# Patient Record
Sex: MALE | Race: WHITE | NOT HISPANIC OR LATINO | URBAN - METROPOLITAN AREA
[De-identification: names, ages, dates, MRNs, and addresses within clinical notes are randomized per-mention and may not be internally consistent; named-entity substitution may affect disease eponyms.]

---

## 2018-12-31 ENCOUNTER — EMERGENCY (EMERGENCY)
Facility: HOSPITAL | Age: 73
LOS: 1 days | Discharge: HOME | End: 2018-12-31
Attending: EMERGENCY MEDICINE | Admitting: EMERGENCY MEDICINE
Payer: MEDICARE

## 2018-12-31 VITALS
HEART RATE: 102 BPM | DIASTOLIC BLOOD PRESSURE: 72 MMHG | TEMPERATURE: 97 F | SYSTOLIC BLOOD PRESSURE: 141 MMHG | RESPIRATION RATE: 16 BRPM | OXYGEN SATURATION: 100 %

## 2018-12-31 VITALS
OXYGEN SATURATION: 100 % | DIASTOLIC BLOOD PRESSURE: 85 MMHG | SYSTOLIC BLOOD PRESSURE: 153 MMHG | TEMPERATURE: 99 F | RESPIRATION RATE: 20 BRPM | HEART RATE: 93 BPM

## 2018-12-31 LAB
ALBUMIN SERPL ELPH-MCNC: 3.7 G/DL — SIGNIFICANT CHANGE UP (ref 3.3–5)
ALP SERPL-CCNC: 95 U/L — SIGNIFICANT CHANGE UP (ref 40–120)
ALT FLD-CCNC: 25 U/L — SIGNIFICANT CHANGE UP (ref 4–41)
AST SERPL-CCNC: 27 U/L — SIGNIFICANT CHANGE UP (ref 4–40)
B PERT DNA SPEC QL NAA+PROBE: NOT DETECTED — SIGNIFICANT CHANGE UP
BASE EXCESS BLDV CALC-SCNC: 4.2 MMOL/L — SIGNIFICANT CHANGE UP
BASOPHILS # BLD AUTO: 0.02 K/UL — SIGNIFICANT CHANGE UP (ref 0–0.2)
BASOPHILS NFR BLD AUTO: 0.2 % — SIGNIFICANT CHANGE UP (ref 0–2)
BILIRUB SERPL-MCNC: < 0.2 MG/DL — LOW (ref 0.2–1.2)
BLOOD GAS VENOUS - CREATININE: 0.48 MG/DL — LOW (ref 0.5–1.3)
BUN SERPL-MCNC: 7 MG/DL — SIGNIFICANT CHANGE UP (ref 7–23)
C PNEUM DNA SPEC QL NAA+PROBE: NOT DETECTED — SIGNIFICANT CHANGE UP
CALCIUM SERPL-MCNC: 9.1 MG/DL — SIGNIFICANT CHANGE UP (ref 8.4–10.5)
CHLORIDE BLDV-SCNC: 102 MMOL/L — SIGNIFICANT CHANGE UP (ref 96–108)
CHLORIDE SERPL-SCNC: 97 MMOL/L — LOW (ref 98–107)
CO2 SERPL-SCNC: 27 MMOL/L — SIGNIFICANT CHANGE UP (ref 22–31)
CREAT SERPL-MCNC: 0.66 MG/DL — SIGNIFICANT CHANGE UP (ref 0.5–1.3)
EOSINOPHIL # BLD AUTO: 0.05 K/UL — SIGNIFICANT CHANGE UP (ref 0–0.5)
EOSINOPHIL NFR BLD AUTO: 0.6 % — SIGNIFICANT CHANGE UP (ref 0–6)
FLUAV H1 2009 PAND RNA SPEC QL NAA+PROBE: NOT DETECTED — SIGNIFICANT CHANGE UP
FLUAV H1 RNA SPEC QL NAA+PROBE: NOT DETECTED — SIGNIFICANT CHANGE UP
FLUAV H3 RNA SPEC QL NAA+PROBE: NOT DETECTED — SIGNIFICANT CHANGE UP
FLUAV SUBTYP SPEC NAA+PROBE: NOT DETECTED — SIGNIFICANT CHANGE UP
FLUBV RNA SPEC QL NAA+PROBE: NOT DETECTED — SIGNIFICANT CHANGE UP
GAS PNL BLDV: 130 MMOL/L — LOW (ref 136–146)
GLUCOSE BLDV-MCNC: 81 — SIGNIFICANT CHANGE UP (ref 70–99)
GLUCOSE SERPL-MCNC: 78 MG/DL — SIGNIFICANT CHANGE UP (ref 70–99)
HADV DNA SPEC QL NAA+PROBE: NOT DETECTED — SIGNIFICANT CHANGE UP
HCO3 BLDV-SCNC: 25 MMOL/L — SIGNIFICANT CHANGE UP (ref 20–27)
HCOV PNL SPEC NAA+PROBE: DETECTED — HIGH
HCT VFR BLD CALC: 37.4 % — LOW (ref 39–50)
HCT VFR BLDV CALC: 40.5 % — SIGNIFICANT CHANGE UP (ref 39–51)
HGB BLD-MCNC: 12.9 G/DL — LOW (ref 13–17)
HGB BLDV-MCNC: 13.2 G/DL — SIGNIFICANT CHANGE UP (ref 13–17)
HMPV RNA SPEC QL NAA+PROBE: NOT DETECTED — SIGNIFICANT CHANGE UP
HPIV1 RNA SPEC QL NAA+PROBE: NOT DETECTED — SIGNIFICANT CHANGE UP
HPIV2 RNA SPEC QL NAA+PROBE: NOT DETECTED — SIGNIFICANT CHANGE UP
HPIV3 RNA SPEC QL NAA+PROBE: NOT DETECTED — SIGNIFICANT CHANGE UP
HPIV4 RNA SPEC QL NAA+PROBE: NOT DETECTED — SIGNIFICANT CHANGE UP
IMM GRANULOCYTES # BLD AUTO: 0.03 # — SIGNIFICANT CHANGE UP
IMM GRANULOCYTES NFR BLD AUTO: 0.4 % — SIGNIFICANT CHANGE UP (ref 0–1.5)
LACTATE BLDV-MCNC: 1.4 MMOL/L — SIGNIFICANT CHANGE UP (ref 0.5–2)
LIDOCAIN IGE QN: 18.1 U/L — SIGNIFICANT CHANGE UP (ref 7–60)
LYMPHOCYTES # BLD AUTO: 1.81 K/UL — SIGNIFICANT CHANGE UP (ref 1–3.3)
LYMPHOCYTES # BLD AUTO: 22 % — SIGNIFICANT CHANGE UP (ref 13–44)
MCHC RBC-ENTMCNC: 31.8 PG — SIGNIFICANT CHANGE UP (ref 27–34)
MCHC RBC-ENTMCNC: 34.5 % — SIGNIFICANT CHANGE UP (ref 32–36)
MCV RBC AUTO: 92.1 FL — SIGNIFICANT CHANGE UP (ref 80–100)
MONOCYTES # BLD AUTO: 1.54 K/UL — HIGH (ref 0–0.9)
MONOCYTES NFR BLD AUTO: 18.7 % — HIGH (ref 2–14)
NEUTROPHILS # BLD AUTO: 4.78 K/UL — SIGNIFICANT CHANGE UP (ref 1.8–7.4)
NEUTROPHILS NFR BLD AUTO: 58.1 % — SIGNIFICANT CHANGE UP (ref 43–77)
NRBC # FLD: 0 — SIGNIFICANT CHANGE UP
PCO2 BLDV: 59 MMHG — HIGH (ref 41–51)
PH BLDV: 7.32 PH — SIGNIFICANT CHANGE UP (ref 7.32–7.43)
PLATELET # BLD AUTO: 267 K/UL — SIGNIFICANT CHANGE UP (ref 150–400)
PMV BLD: 8.7 FL — SIGNIFICANT CHANGE UP (ref 7–13)
PO2 BLDV: 21 MMHG — LOW (ref 35–40)
POTASSIUM BLDV-SCNC: 3.4 MMOL/L — SIGNIFICANT CHANGE UP (ref 3.4–4.5)
POTASSIUM SERPL-MCNC: 3.6 MMOL/L — SIGNIFICANT CHANGE UP (ref 3.5–5.3)
POTASSIUM SERPL-SCNC: 3.6 MMOL/L — SIGNIFICANT CHANGE UP (ref 3.5–5.3)
PROT SERPL-MCNC: 6.9 G/DL — SIGNIFICANT CHANGE UP (ref 6–8.3)
RBC # BLD: 4.06 M/UL — LOW (ref 4.2–5.8)
RBC # FLD: 14.1 % — SIGNIFICANT CHANGE UP (ref 10.3–14.5)
RSV RNA SPEC QL NAA+PROBE: NOT DETECTED — SIGNIFICANT CHANGE UP
RV+EV RNA SPEC QL NAA+PROBE: NOT DETECTED — SIGNIFICANT CHANGE UP
SAO2 % BLDV: 22.9 % — LOW (ref 60–85)
SODIUM SERPL-SCNC: 135 MMOL/L — SIGNIFICANT CHANGE UP (ref 135–145)
WBC # BLD: 8.23 K/UL — SIGNIFICANT CHANGE UP (ref 3.8–10.5)
WBC # FLD AUTO: 8.23 K/UL — SIGNIFICANT CHANGE UP (ref 3.8–10.5)

## 2018-12-31 PROCEDURE — 99283 EMERGENCY DEPT VISIT LOW MDM: CPT | Mod: GC

## 2018-12-31 PROCEDURE — 71046 X-RAY EXAM CHEST 2 VIEWS: CPT | Mod: 26

## 2018-12-31 NOTE — ED PROVIDER NOTE - CONSTITUTIONAL, MLM
normal... Sitting up in bed, patient doesn't appear to be in any distress, nods "no" to all questions asked

## 2018-12-31 NOTE — ED PROVIDER NOTE - ENMT, MLM
Airway patent, Nasal mucosa w/ dried discharge, Mouth with normal mucosa. Throat has no vesicles, Tonsils mildly erythematous, mildly enlarged bilaterally, no evidence of abscess, no exudates, oropharynx patent. Uvula is midline..

## 2018-12-31 NOTE — ED PROVIDER NOTE - OBJECTIVE STATEMENT
74 yo M h/o schizophrenia per documents, [facility] reports of cough, chest pain, and soar throat that started yest. Patient is poor historian at baseline and shakes his head to all questions. Aid at bedside,. provides the above history along with documents sent in from the facility.

## 2018-12-31 NOTE — ED PROVIDER NOTE - NSFOLLOWUPINSTRUCTIONS_ED_ALL_ED_FT
-- Please follow up with your doctor(s) within 3 days, but seek care sooner if your symptoms are worsening.  -- Rest, increase your fluid intake and avoid dehydration & alcohol.   -- It is very important to be diligent about hand washing & hygiene to avoid spreading the illness to others.  -- You were given a copy of the results from any tests performed today in the Emergency Department which have results available.  Show these to your doctor(s).   Some of the tests we sent may not have results yet so please call or have your doctor call the Emergency Department to follow up on all results.  -- If you are having any worsening or continued fever, chills, weakness, nausea, vomiting, abdominal pain, please see your doctor or return to the Emergency Department.  -- Please continue taking your home medications as directed.  Do not use alcohol when taking any medication (especially antibiotics, tylenol or other pain medication) unless you check with the doctor or pharmacist.

## 2018-12-31 NOTE — ED ADULT NURSE NOTE - OBJECTIVE STATEMENT
Pt from [facility] sent for N/V, sore throat, and cough since last night.  Pt denies any complaints.  Calm cooperative behavior.  1:1 from BF in place.  Labs obtained and sent as ordered.  #20g SL L AC placed.  Kept NPO

## 2018-12-31 NOTE — ED PROVIDER NOTE - NS ED ROS FT
Unable to obtain, secondary to mental status from patient  Per Sheets sent in from facility and aid at beside, ROS significant for:  +Cough, +CP, +soar throat.

## 2018-12-31 NOTE — ED PROVIDER NOTE - PROGRESS NOTE DETAILS
Zvi Dubin, MD ([id number]) note: Pt seen & reassessed.  Pt symptomatically improved.   A&Ox3, NAD, VSS, pt tolerated PO & ambulated w/steady unassisted gait in the ED.  We discussed the results of ED w/u w/patient & staff member ranlufo @bedside (incl. presumptive Emergency Department dx, associated anticipatory guidance, stressing importance of prompt f/u, return precautions), & gave them a copy of results.  Patient verbalized understanding of ED course & agreed with our f/u recommendations, has decisional making capacity.  Pt st they will f/u w/PMD within the next 3 days; pt agrees to call today or tomorrow for an appointment. Pt agrees to return to the ED if there is any worsening or concerning symptoms.

## 2018-12-31 NOTE — ED PROVIDER NOTE - RESPIRATORY, MLM
Right lower lobe rales, rest of lung fields - no wheezes, rales or rhonchi, moving air, although distant breath sounds throughout.
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
## 2018-12-31 NOTE — ED ADULT TRIAGE NOTE - CHIEF COMPLAINT QUOTE
Sent from group home c/o sore throat, cough, vomiting and poor appetite since yesterday. Denies diarrhea or fevers.

## 2018-12-31 NOTE — ED PROVIDER NOTE - NEUROLOGICAL, MLM
Limited, mental status limited secondary to baseline mental status - Tone WNL, moving all extremities, reflexes intact throughout, pupils are 2+ and symmetric , no nystagmus.  Unable to assess sensation, cerebellar function, 2/2 mental status

## 2018-12-31 NOTE — ED ADULT NURSE NOTE - NSIMPLEMENTINTERV_GEN_ALL_ED
Implemented All Universal Safety Interventions:  [place] to call system. Call bell, personal items and telephone within reach. Instruct patient to call for assistance. Room bathroom lighting operational. Non-slip footwear when patient is off stretcher. Physically safe environment: no spills, clutter or unnecessary equipment. Stretcher in lowest position, wheels locked, appropriate side rails in place.

## 2019-08-10 ENCOUNTER — EMERGENCY (EMERGENCY)
Facility: HOSPITAL | Age: 74
LOS: 0 days | Discharge: HOME | End: 2019-08-10
Payer: MEDICARE

## 2019-08-10 VITALS
DIASTOLIC BLOOD PRESSURE: 73 MMHG | OXYGEN SATURATION: 98 % | RESPIRATION RATE: 16 BRPM | HEIGHT: 70 IN | WEIGHT: 130.07 LBS | HEART RATE: 86 BPM | TEMPERATURE: 98 F | SYSTOLIC BLOOD PRESSURE: 109 MMHG

## 2019-08-10 DIAGNOSIS — F20.9 SCHIZOPHRENIA, UNSPECIFIED: ICD-10-CM

## 2019-08-10 DIAGNOSIS — S02.2XXA FRACTURE OF NASAL BONES, INITIAL ENCOUNTER FOR CLOSED FRACTURE: ICD-10-CM
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
## 2019-08-10 DIAGNOSIS — H57.11 OCULAR PAIN, RIGHT EYE: ICD-10-CM
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
## 2019-08-10 PROCEDURE — 70450 CT HEAD/BRAIN W/O DYE: CPT | Mod: 26

## 2019-08-10 PROCEDURE — 76377 3D RENDER W/INTRP POSTPROCES: CPT | Mod: 26

## 2019-08-10 PROCEDURE — 70486 CT MAXILLOFACIAL W/O DYE: CPT | Mod: 26

## 2019-08-10 PROCEDURE — 99284 EMERGENCY DEPT VISIT MOD MDM: CPT | Mod: 25

## 2019-08-10 PROCEDURE — 21310: CPT

## 2019-08-10 RX ORDER — ACETAMINOPHEN 500 MG
650 TABLET ORAL ONCE
Refills: 0 | Status: COMPLETED | OUTPATIENT
Start: 2019-08-10 | End: 2019-08-10

## 2019-08-10 RX ADMIN — Medication 650 MILLIGRAM(S): at 14:27
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
## 2020-01-12 ENCOUNTER — EMERGENCY (EMERGENCY)
Facility: HOSPITAL | Age: 75
LOS: 1 days | Discharge: HOME | End: 2020-01-12
Admitting: EMERGENCY MEDICINE
Payer: MEDICARE

## 2020-01-12 VITALS
TEMPERATURE: 98 F | DIASTOLIC BLOOD PRESSURE: 77 MMHG | HEART RATE: 95 BPM | RESPIRATION RATE: 18 BRPM | OXYGEN SATURATION: 98 % | SYSTOLIC BLOOD PRESSURE: 138 MMHG

## 2020-01-12 PROCEDURE — 73502 X-RAY EXAM HIP UNI 2-3 VIEWS: CPT | Mod: 26,LT

## 2020-01-12 PROCEDURE — 99283 EMERGENCY DEPT VISIT LOW MDM: CPT

## 2020-01-12 PROCEDURE — 73564 X-RAY EXAM KNEE 4 OR MORE: CPT | Mod: 26,LT

## 2020-01-12 NOTE — ED ADULT TRIAGE NOTE - CHIEF COMPLAINT QUOTE
Pt brought in by group home member for fall yesterday, states pt did not hit his head. Pt not answering questions at this time.

## 2020-01-12 NOTE — ED PROVIDER NOTE - PROGRESS NOTE DETAILS
ERIBERTO Dill- Pts staff stated that she would call to arrange transport back to group home. discharge paperk work and xray reports were given to staff member.

## 2020-01-12 NOTE — ED PROVIDER NOTE - PATIENT PORTAL LINK FT
You can access the FollowMyHealth Patient Portal offered by [facility] by registering at the following website: [email or web address]. By joining [address]’s FollowMyHealth portal, you will also be able to view your health information using other applications (apps) compatible with our system.

## 2020-01-12 NOTE — ED PROVIDER NOTE - CLINICAL SUMMARY MEDICAL DECISION MAKING FREE TEXT BOX
73 y/o male pmh schizophrenia from group home w/ witnessed fall- pt is poor hx, no acute findings on PE but will check xray to r/o possible underlying fracture.

## 2020-01-12 NOTE — ED PROVIDER NOTE - OBJECTIVE STATEMENT
73 y/o male pmh schizophrenia sent in from group home for mechanical fall x1 day ago. As per staff, pt had witnessed fall yesterday, falling on his R buttock, denies head trauma or LOC. Pt was limping today which prompted ER visit. Pt admits to left knee pain. Denies any other complaints.

## 2021-03-12 ENCOUNTER — EMERGENCY (EMERGENCY)
Facility: HOSPITAL | Age: 76
LOS: 0 days | Discharge: HOME | End: 2021-03-12
Attending: EMERGENCY MEDICINE
Payer: MEDICARE

## 2021-03-12 VITALS
HEART RATE: 90 BPM | SYSTOLIC BLOOD PRESSURE: 131 MMHG | DIASTOLIC BLOOD PRESSURE: 74 MMHG | RESPIRATION RATE: 19 BRPM | OXYGEN SATURATION: 97 % | TEMPERATURE: 98 F | HEIGHT: 70 IN | WEIGHT: 164.91 LBS
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
## 2021-03-12 DIAGNOSIS — M25.562 PAIN IN LEFT KNEE: ICD-10-CM

## 2021-03-12 DIAGNOSIS — F20.9 SCHIZOPHRENIA, UNSPECIFIED: ICD-10-CM

## 2021-03-12 DIAGNOSIS — I10 ESSENTIAL (PRIMARY) HYPERTENSION: ICD-10-CM
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
## 2021-03-12 DIAGNOSIS — Z91.041 RADIOGRAPHIC DYE ALLERGY STATUS: ICD-10-CM
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
## 2021-03-12 DIAGNOSIS — F79 UNSPECIFIED INTELLECTUAL DISABILITIES: ICD-10-CM

## 2021-03-12 PROCEDURE — 99284 EMERGENCY DEPT VISIT MOD MDM: CPT

## 2021-03-12 PROCEDURE — 73564 X-RAY EXAM KNEE 4 OR MORE: CPT | Mod: 26,LT

## 2021-03-12 PROCEDURE — 70450 CT HEAD/BRAIN W/O DYE: CPT | Mod: 26,MH

## 2021-03-12 PROCEDURE — 72125 CT NECK SPINE W/O DYE: CPT | Mod: 26,MH

## 2021-03-12 NOTE — ED PROVIDER NOTE - CLINICAL SUMMARY MEDICAL DECISION MAKING FREE TEXT BOX
Well appearing male from group home, at baseline, no obvious injuries, walking at baseline, fell in shower.  VSS.  Ct imaging and XRs unremarkable, unable appreciate any injuries on exam, patient ok to dc back to group home.  Based on patient's history and physical exam, there are no apparent indications for further emergent labs, imaging, or additional diagnostics at this time.  Caregiver advised to have patient f/u with PMD.

## 2021-03-12 NOTE — ED PROVIDER NOTE - PATIENT PORTAL LINK FT
You can access the FollowMyHealth Patient Portal offered by [facility] by registering at the following website: [email or web address]. By joining [email or web address]’s FollowMyHealth portal, you will also be able to view your health information using other applications (apps) compatible with our system.

## 2021-03-12 NOTE — ED ADULT TRIAGE NOTE - CHIEF COMPLAINT QUOTE
hx of intellectual disorder and schizophrenia per EMS [facility] S/P unwitnessed fall in shower, unknown LOC. Pt states he slipped in shower C/O L knee pain.

## 2021-03-12 NOTE — ED PROVIDER NOTE - OBJECTIVE STATEMENT
Pertinent PMH/PSH/FHx/SHx and Review of Systems contained within:  Patient presents to the ED for evaluation s/p fall.  Patient comes from [facility] group home, unable to give clear history, rambles which is normal for him.  Caregiver at bedside only knows that he fell while in group home, unwitnessed, no known LOC.  Patient in no distress, moving extremities, no apparent head injury, was c/o left knee pain to staff.   He denies any distress, keeps saying "Im ok!"  Per chart he is not on any blood thinners.     Relevant PMHx/SHx/SOCHx/FAMH:  HTN, seizures, intellectual disability, schizophrenia  Staff denies EtOH/tobacco/illicit substance use. Pertinent PMH/PSH/FHx/SHx and Review of Systems contained within:  Patient presents to the ED for evaluation s/p fall.  Patient comes from [facility] group home, unable to give clear history, rambles which is normal for him.  Caregiver at bedside only knows that he fell while in group home taking shower, unwitnessed, no known LOC, no visible injury.  Patient in no distress, moving extremities, no apparent head injury, was c/o left knee pain to staff.   He denies any distress, keeps saying "Im ok!"  He says that he slipped and seems to recall the fall.  Per chart he is not on any blood thinners.  Caregiver says that patient at baseline.     Relevant PMHx/SHx/SOCHx/FAMH:  HTN, seizures, intellectual disability, schizophrenia  Staff denies EtOH/tobacco/illicit substance use. Pertinent PMH/PSH/FHx/SHx and Review of Systems contained within:  Patient presents to the ED for evaluation s/p fall.  Patient comes from [facility] group home.  Caregiver at bedside says that he fell while in group home taking shower, unwitnessed, no known LOC, no visible injury.  Patient in no distress, moving extremities, no apparent head injury, was c/o left knee pain to staff.   He denies any distress, keeps saying "Im ok!"  He says that he slipped on soap and recalls the fall.  Per chart he is not on any blood thinners.  Caregiver says that patient at baseline.  He currently denies any knee pain.    Relevant PMHx/SHx/SOCHx/FAMH:  HTN, seizures, intellectual disability, schizophrenia  Staff denies EtOH/tobacco/illicit substance use.

## 2022-02-26 ENCOUNTER — INPATIENT (INPATIENT)
Facility: HOSPITAL | Age: 77
LOS: 4 days | Discharge: HOME | End: 2022-03-03
Attending: HOSPITALIST | Admitting: HOSPITALIST
Payer: MEDICARE

## 2022-02-26 VITALS
SYSTOLIC BLOOD PRESSURE: 122 MMHG | TEMPERATURE: 97 F | DIASTOLIC BLOOD PRESSURE: 73 MMHG | HEIGHT: 70 IN | HEART RATE: 95 BPM | OXYGEN SATURATION: 94 % | WEIGHT: 130.07 LBS | RESPIRATION RATE: 16 BRPM

## 2022-02-26 LAB
ALBUMIN SERPL ELPH-MCNC: 2.7 G/DL — LOW (ref 3.3–5)
ALP SERPL-CCNC: 77 U/L — SIGNIFICANT CHANGE UP (ref 40–120)
ALT FLD-CCNC: 20 U/L — SIGNIFICANT CHANGE UP (ref 12–78)
ANION GAP SERPL CALC-SCNC: 3 MMOL/L — LOW (ref 5–17)
APTT BLD: 38 SEC — HIGH (ref 27.5–35.5)
AST SERPL-CCNC: 21 U/L — SIGNIFICANT CHANGE UP (ref 15–37)
BASOPHILS # BLD AUTO: 0.02 K/UL — SIGNIFICANT CHANGE UP (ref 0–0.2)
BASOPHILS NFR BLD AUTO: 0.3 % — SIGNIFICANT CHANGE UP (ref 0–2)
BILIRUB SERPL-MCNC: 0.3 MG/DL — SIGNIFICANT CHANGE UP (ref 0.2–1.2)
BUN SERPL-MCNC: 8 MG/DL — SIGNIFICANT CHANGE UP (ref 7–23)
CALCIUM SERPL-MCNC: 8.5 MG/DL — SIGNIFICANT CHANGE UP (ref 8.5–10.1)
CHLORIDE SERPL-SCNC: 97 MMOL/L — SIGNIFICANT CHANGE UP (ref 96–108)
CO2 SERPL-SCNC: 32 MMOL/L — HIGH (ref 22–31)
CREAT SERPL-MCNC: 0.62 MG/DL — SIGNIFICANT CHANGE UP (ref 0.5–1.3)
EOSINOPHIL # BLD AUTO: 0.11 K/UL — SIGNIFICANT CHANGE UP (ref 0–0.5)
EOSINOPHIL NFR BLD AUTO: 1.7 % — SIGNIFICANT CHANGE UP (ref 0–6)
ERYTHROCYTE [SEDIMENTATION RATE] IN BLOOD: 7 MM/HR — SIGNIFICANT CHANGE UP (ref 0–20)
FLUAV AG NPH QL: SIGNIFICANT CHANGE UP
FLUBV AG NPH QL: SIGNIFICANT CHANGE UP
GLUCOSE SERPL-MCNC: 110 MG/DL — HIGH (ref 70–99)
HCT VFR BLD CALC: 30.1 % — LOW (ref 39–50)
HGB BLD-MCNC: 10.6 G/DL — LOW (ref 13–17)
IMM GRANULOCYTES NFR BLD AUTO: 0.6 % — SIGNIFICANT CHANGE UP (ref 0–1.5)
INR BLD: 1.32 RATIO — HIGH (ref 0.88–1.16)
LYMPHOCYTES # BLD AUTO: 1.63 K/UL — SIGNIFICANT CHANGE UP (ref 1–3.3)
LYMPHOCYTES # BLD AUTO: 25.8 % — SIGNIFICANT CHANGE UP (ref 13–44)
MCHC RBC-ENTMCNC: 32.3 PG — SIGNIFICANT CHANGE UP (ref 27–34)
MCHC RBC-ENTMCNC: 35.2 G/DL — SIGNIFICANT CHANGE UP (ref 32–36)
MCV RBC AUTO: 91.8 FL — SIGNIFICANT CHANGE UP (ref 80–100)
MONOCYTES # BLD AUTO: 1.13 K/UL — HIGH (ref 0–0.9)
MONOCYTES NFR BLD AUTO: 17.9 % — HIGH (ref 2–14)
NEUTROPHILS # BLD AUTO: 3.38 K/UL — SIGNIFICANT CHANGE UP (ref 1.8–7.4)
NEUTROPHILS NFR BLD AUTO: 53.7 % — SIGNIFICANT CHANGE UP (ref 43–77)
NRBC # BLD: 0 /100 WBCS — SIGNIFICANT CHANGE UP (ref 0–0)
PLATELET # BLD AUTO: 199 K/UL — SIGNIFICANT CHANGE UP (ref 150–400)
POTASSIUM SERPL-MCNC: 3.9 MMOL/L — SIGNIFICANT CHANGE UP (ref 3.5–5.3)
POTASSIUM SERPL-SCNC: 3.9 MMOL/L — SIGNIFICANT CHANGE UP (ref 3.5–5.3)
PROT SERPL-MCNC: 6.5 GM/DL — SIGNIFICANT CHANGE UP (ref 6–8.3)
PROTHROM AB SERPL-ACNC: 15.7 SEC — HIGH (ref 10.5–13.4)
RBC # BLD: 3.28 M/UL — LOW (ref 4.2–5.8)
RBC # FLD: 14.3 % — SIGNIFICANT CHANGE UP (ref 10.3–14.5)
SARS-COV-2 RNA SPEC QL NAA+PROBE: SIGNIFICANT CHANGE UP
SODIUM SERPL-SCNC: 132 MMOL/L — LOW (ref 135–145)
WBC # BLD: 6.31 K/UL — SIGNIFICANT CHANGE UP (ref 3.8–10.5)
WBC # FLD AUTO: 6.31 K/UL — SIGNIFICANT CHANGE UP (ref 3.8–10.5)

## 2022-02-26 PROCEDURE — 93010 ELECTROCARDIOGRAM REPORT: CPT

## 2022-02-26 PROCEDURE — 73600 X-RAY EXAM OF ANKLE: CPT | Mod: 26,RT

## 2022-02-26 PROCEDURE — 99285 EMERGENCY DEPT VISIT HI MDM: CPT

## 2022-02-26 PROCEDURE — 73590 X-RAY EXAM OF LOWER LEG: CPT | Mod: 26,RT

## 2022-02-26 PROCEDURE — 99222 1ST HOSP IP/OBS MODERATE 55: CPT

## 2022-02-26 PROCEDURE — 93971 EXTREMITY STUDY: CPT | Mod: 26,RT

## 2022-02-26 RX ORDER — CLOPIDOGREL BISULFATE 75 MG/1
75 TABLET, FILM COATED ORAL DAILY
Refills: 0 | Status: DISCONTINUED | OUTPATIENT
Start: 2022-02-26 | End: 2022-03-03

## 2022-02-26 RX ORDER — ACETAMINOPHEN 500 MG
975 TABLET ORAL ONCE
Refills: 0 | Status: COMPLETED | OUTPATIENT
Start: 2022-02-26 | End: 2022-02-26

## 2022-02-26 RX ORDER — CITALOPRAM 10 MG/1
20 TABLET, FILM COATED ORAL DAILY
Refills: 0 | Status: DISCONTINUED | OUTPATIENT
Start: 2022-02-26 | End: 2022-02-26

## 2022-02-26 RX ORDER — ALBUTEROL 90 UG/1
2 AEROSOL, METERED ORAL EVERY 6 HOURS
Refills: 0 | Status: DISCONTINUED | OUTPATIENT
Start: 2022-02-26 | End: 2022-03-03

## 2022-02-26 RX ORDER — ASPIRIN/CALCIUM CARB/MAGNESIUM 324 MG
81 TABLET ORAL DAILY
Refills: 0 | Status: DISCONTINUED | OUTPATIENT
Start: 2022-02-26 | End: 2022-03-03

## 2022-02-26 RX ORDER — LACTULOSE 10 G/15ML
10 SOLUTION ORAL DAILY
Refills: 0 | Status: DISCONTINUED | OUTPATIENT
Start: 2022-02-26 | End: 2022-03-03

## 2022-02-26 RX ORDER — CITALOPRAM 10 MG/1
1 TABLET, FILM COATED ORAL
Qty: 0 | Refills: 0 | DISCHARGE

## 2022-02-26 RX ORDER — CITALOPRAM 10 MG/1
30 TABLET, FILM COATED ORAL DAILY
Refills: 0 | Status: DISCONTINUED | OUTPATIENT
Start: 2022-02-26 | End: 2022-03-03

## 2022-02-26 RX ORDER — DIVALPROEX SODIUM 500 MG/1
500 TABLET, DELAYED RELEASE ORAL
Refills: 0 | Status: DISCONTINUED | OUTPATIENT
Start: 2022-02-26 | End: 2022-03-03

## 2022-02-26 RX ORDER — AMLODIPINE BESYLATE 2.5 MG/1
1 TABLET ORAL
Qty: 0 | Refills: 0 | DISCHARGE

## 2022-02-26 RX ORDER — DILTIAZEM HCL 120 MG
120 CAPSULE, EXT RELEASE 24 HR ORAL DAILY
Refills: 0 | Status: DISCONTINUED | OUTPATIENT
Start: 2022-02-26 | End: 2022-03-03

## 2022-02-26 RX ORDER — DIVALPROEX SODIUM 500 MG/1
0 TABLET, DELAYED RELEASE ORAL
Qty: 0 | Refills: 0 | DISCHARGE

## 2022-02-26 RX ORDER — ATORVASTATIN CALCIUM 80 MG/1
20 TABLET, FILM COATED ORAL AT BEDTIME
Refills: 0 | Status: DISCONTINUED | OUTPATIENT
Start: 2022-02-26 | End: 2022-03-03

## 2022-02-26 RX ORDER — CEFTRIAXONE 500 MG/1
1000 INJECTION, POWDER, FOR SOLUTION INTRAMUSCULAR; INTRAVENOUS ONCE
Refills: 0 | Status: COMPLETED | OUTPATIENT
Start: 2022-02-26 | End: 2022-02-26

## 2022-02-26 RX ORDER — OLANZAPINE 15 MG/1
20 TABLET, FILM COATED ORAL DAILY
Refills: 0 | Status: DISCONTINUED | OUTPATIENT
Start: 2022-02-26 | End: 2022-03-03

## 2022-02-26 RX ADMIN — Medication 975 MILLIGRAM(S): at 21:32

## 2022-02-26 RX ADMIN — CEFTRIAXONE 100 MILLIGRAM(S): 500 INJECTION, POWDER, FOR SOLUTION INTRAMUSCULAR; INTRAVENOUS at 21:32

## 2022-02-26 RX ADMIN — Medication 975 MILLIGRAM(S): at 19:54

## 2022-02-26 RX ADMIN — DIVALPROEX SODIUM 500 MILLIGRAM(S): 500 TABLET, DELAYED RELEASE ORAL at 23:46

## 2022-02-26 NOTE — ED PROVIDER NOTE - CLINICAL SUMMARY MEDICAL DECISION MAKING FREE TEXT BOX
76y Male with PMHx of HTN, COPD, schizophrenia, mild intellectual disability [facility]. RLE pain and swelling with skin color changes for unknown period of time. Denies chest pain, SOB or difficulty breathing. RLE swelling, erythema and pain - will get basics, coags, pain meds and venous duplex.

## 2022-02-26 NOTE — H&P ADULT - NSHPLABSRESULTS_GEN_ALL_CORE
T(C): 36.3 (02-26-22 @ 23:57), Max: 36.3 (02-26-22 @ 18:31)  HR: 69 (02-26-22 @ 23:57) (69 - 95)  BP: 122/78 (02-26-22 @ 23:57) (122/73 - 122/78)  RR: 18 (02-26-22 @ 23:57) (16 - 18)  SpO2: 96% (02-26-22 @ 23:57) (94% - 96%)                        10.6   6.31  )-----------( 199      ( 26 Feb 2022 20:10 )             30.1     02-26    132<L>  |  97  |  8   ----------------------------<  110<H>  3.9   |  32<H>  |  0.62    Ca    8.5      26 Feb 2022 20:10    TPro  6.5  /  Alb  2.7<L>  /  TBili  0.3  /  DBili  x   /  AST  21  /  ALT  20  /  AlkPhos  77  02-26    LIVER FUNCTIONS - ( 26 Feb 2022 20:10 )  Alb: 2.7 g/dL / Pro: 6.5 gm/dL / ALK PHOS: 77 U/L / ALT: 20 U/L / AST: 21 U/L / GGT: x           PT/INR - ( 26 Feb 2022 20:10 )   PT: 15.7 sec;   INR: 1.32 ratio         PTT - ( 26 Feb 2022 20:10 )  PTT:38.0 sec    < from: US Duplex Venous Lower Ext Ltd, Right (02.26.22 @ 20:45) >    IMPRESSION:  No evidence of right lower extremity deep venous thrombosis.    < end of copied text >    acetaminophen     Tablet .. 650 milliGRAM(s) Oral every 6 hours PRN  ALBUTerol    90 MICROgram(s) HFA Inhaler 2 Puff(s) Inhalation every 6 hours PRN  aspirin enteric coated 81 milliGRAM(s) Oral daily  atorvastatin 20 milliGRAM(s) Oral at bedtime  citalopram 30 milliGRAM(s) Oral daily  clindamycin   Capsule 600 milliGRAM(s) Oral every 8 hours  clopidogrel Tablet 75 milliGRAM(s) Oral daily  diltiazem    milliGRAM(s) Oral daily  diVALproex  milliGRAM(s) Oral <User Schedule>  lactulose Syrup 10 Gram(s) Oral daily  multivitamin 1 Tablet(s) Oral daily  OLANZapine 20 milliGRAM(s) Oral daily  ondansetron Injectable 4 milliGRAM(s) IV Push every 8 hours PRN  traMADol 50 milliGRAM(s) Oral every 6 hours PRN

## 2022-02-26 NOTE — ED PROVIDER NOTE - ATTENDING CONTRIBUTION TO CARE
Dr. Ayaz WARE: I performed a face to face bedside interview with patient regarding history of present illness, review of symptoms and past medical history. I completed an independent physical exam.  I have discussed patient's plan of care with PA.   I agree with note as stated above, having amended the EMR as needed to reflect my findings.   This includes HISTORY OF PRESENT ILLNESS, HIV, PAST MEDICAL/SURGICAL/FAMILY/SOCIAL HISTORY, ALLERGIES AND HOME MEDICATIONS, REVIEW OF SYSTEMS, PHYSICAL EXAM, and any PROGRESS NOTES during the time I functioned as the attending physician for this patient.

## 2022-02-26 NOTE — H&P ADULT - HISTORY OF PRESENT ILLNESS
75 y/o Male with PMHx of HTN, COPD, schizophrenia, mild intellectual disability [facility]. Patient comes from [facility] where Aid at bedside reports RLE pain and swelling with skin color changes for unknown period of time; aid stated he came in this morning and pt's leg was red and swollen. No reported trauma, fever or chills. Pt initially sent to  to r/o DVT; underwent ? XR which was neg per aid then sent to ED for further evaluation.

## 2022-02-26 NOTE — ED ADULT NURSE NOTE - OBJECTIVE STATEMENT
Pt is a 77 yo M, AOx3 ambulatory at baseline, pmhx MR, schizophrenia from group home with aide (Martir) at bedside. Pt sent in [facility] for rule out D[facility]. RLE swollen, red and painful. Denies sob, cp, fever, chills, n/v/d. Allergies listed

## 2022-02-26 NOTE — PATIENT PROFILE ADULT - FALL HARM RISK - HARM RISK INTERVENTIONS

## 2022-02-26 NOTE — ED PROVIDER NOTE - OBJECTIVE STATEMENT
76y Male with PMHx of HTN, COPD, schizophrenia, mild intellectual disability [facility]. Patient comes from [facility], staff reports RLE pain and swelling with skin color changes for unknown period of time. Sent from urgent care to rule out DVT. Denies chest pain, SOB or difficulty breathing. Denies pain medications.

## 2022-02-26 NOTE — H&P ADULT - ASSESSMENT
77 y/o Male with PMHx of HTN, COPD, schizophrenia, mild intellectual disability [facility], pt being admitted for difficulty ambulating    1) RLE Pain, swelling difficulty ambulating   - R leg w/ multiple bruises/echymosis; appears at least a few days old  - Xrays pre-shepard neg   - ? mild superimposed cellulitis; place on PO Clinda  - Pain control  - fall precautions  - would attempt to reach group home in the am to obtain further hx as to how pt's leg got like this    2) Schizophrenia, MR  - c/w Celexa  - c/w Depakote    3) HTN  - c/w Diltiazem    4) COPD  - c/w inhalers prn    5) DVT ppx - Lovenox subq

## 2022-02-26 NOTE — ED PROVIDER NOTE - SKIN, MLM
Skin normal color for race, warm, dry and intact. Erythema and increased warmth to anterior distal RLE. Skin normal color for race, warm, dry and intact. Circumferential erythema and increased warmth to anterior distal RLE with tenderness to touch.

## 2022-02-26 NOTE — H&P ADULT - NSICDXPASTMEDICALHX_GEN_ALL_CORE_FT
PAST MEDICAL HISTORY:  Benign essential HTN     History of COPD     Intellectual disability     Schizophrenia

## 2022-02-26 NOTE — ED PROVIDER NOTE - NS ED ROS FT
Constitutional: (-) Fever, (-) Chills  Skin: (+) Rashes  Eyes: (-) Visual changes, (-) Discharge, (-) Redness  Ears: (-) Hearing loss, (-)Tinnitus, (-) Ear pain  Nose: (-) Nasal congestion, (-) Runny nose  Mouth/Throat: (-) Sore throat  CV: (-) Chest pain  Resp: (-) Cough, (-) Shortness of breath, (-) Dyspnea on Exertion, (-) Wheezing  GI: (-) Abdominal pain, (-) Nausea, (-) Vomiting, (-) Diarrhea  : (-) Dysuria   MSK: (+) Myalgias  Neuro: (-) Headache

## 2022-02-27 LAB
ALBUMIN SERPL ELPH-MCNC: 2.6 G/DL — LOW (ref 3.3–5)
ALP SERPL-CCNC: 72 U/L — SIGNIFICANT CHANGE UP (ref 40–120)
ALT FLD-CCNC: 19 U/L — SIGNIFICANT CHANGE UP (ref 12–78)
AMPHET UR-MCNC: NEGATIVE — SIGNIFICANT CHANGE UP
ANION GAP SERPL CALC-SCNC: 2 MMOL/L — LOW (ref 5–17)
AST SERPL-CCNC: 17 U/L — SIGNIFICANT CHANGE UP (ref 15–37)
BARBITURATES UR SCN-MCNC: NEGATIVE — SIGNIFICANT CHANGE UP
BASOPHILS # BLD AUTO: 0.02 K/UL — SIGNIFICANT CHANGE UP (ref 0–0.2)
BASOPHILS NFR BLD AUTO: 0.3 % — SIGNIFICANT CHANGE UP (ref 0–2)
BENZODIAZ UR-MCNC: NEGATIVE — SIGNIFICANT CHANGE UP
BILIRUB SERPL-MCNC: 0.3 MG/DL — SIGNIFICANT CHANGE UP (ref 0.2–1.2)
BUN SERPL-MCNC: 7 MG/DL — SIGNIFICANT CHANGE UP (ref 7–23)
CALCIUM SERPL-MCNC: 8.2 MG/DL — LOW (ref 8.5–10.1)
CHLORIDE SERPL-SCNC: 96 MMOL/L — SIGNIFICANT CHANGE UP (ref 96–108)
CO2 SERPL-SCNC: 33 MMOL/L — HIGH (ref 22–31)
COCAINE METAB.OTHER UR-MCNC: NEGATIVE — SIGNIFICANT CHANGE UP
CREAT SERPL-MCNC: 0.45 MG/DL — LOW (ref 0.5–1.3)
CRP SERPL-MCNC: 17 MG/L — HIGH
EOSINOPHIL # BLD AUTO: 0.12 K/UL — SIGNIFICANT CHANGE UP (ref 0–0.5)
EOSINOPHIL NFR BLD AUTO: 2.1 % — SIGNIFICANT CHANGE UP (ref 0–6)
ERYTHROCYTE [SEDIMENTATION RATE] IN BLOOD: 5 MM/HR — SIGNIFICANT CHANGE UP (ref 0–20)
GLUCOSE SERPL-MCNC: 73 MG/DL — SIGNIFICANT CHANGE UP (ref 70–99)
HCT VFR BLD CALC: 29.1 % — LOW (ref 39–50)
HCV AB S/CO SERPL IA: 0.29 S/CO — SIGNIFICANT CHANGE UP (ref 0–0.99)
HCV AB SERPL-IMP: SIGNIFICANT CHANGE UP
HGB BLD-MCNC: 10.1 G/DL — LOW (ref 13–17)
IMM GRANULOCYTES NFR BLD AUTO: 0.3 % — SIGNIFICANT CHANGE UP (ref 0–1.5)
LYMPHOCYTES # BLD AUTO: 1.95 K/UL — SIGNIFICANT CHANGE UP (ref 1–3.3)
LYMPHOCYTES # BLD AUTO: 33.4 % — SIGNIFICANT CHANGE UP (ref 13–44)
MAGNESIUM SERPL-MCNC: 1.9 MG/DL — SIGNIFICANT CHANGE UP (ref 1.6–2.6)
MCHC RBC-ENTMCNC: 32.2 PG — SIGNIFICANT CHANGE UP (ref 27–34)
MCHC RBC-ENTMCNC: 34.7 G/DL — SIGNIFICANT CHANGE UP (ref 32–36)
MCV RBC AUTO: 92.7 FL — SIGNIFICANT CHANGE UP (ref 80–100)
METHADONE UR-MCNC: NEGATIVE — SIGNIFICANT CHANGE UP
MONOCYTES # BLD AUTO: 1.09 K/UL — HIGH (ref 0–0.9)
MONOCYTES NFR BLD AUTO: 18.7 % — HIGH (ref 2–14)
NEUTROPHILS # BLD AUTO: 2.64 K/UL — SIGNIFICANT CHANGE UP (ref 1.8–7.4)
NEUTROPHILS NFR BLD AUTO: 45.2 % — SIGNIFICANT CHANGE UP (ref 43–77)
NRBC # BLD: 0 /100 WBCS — SIGNIFICANT CHANGE UP (ref 0–0)
OPIATES UR-MCNC: NEGATIVE — SIGNIFICANT CHANGE UP
PCP SPEC-MCNC: SIGNIFICANT CHANGE UP
PCP UR-MCNC: NEGATIVE — SIGNIFICANT CHANGE UP
PHOSPHATE SERPL-MCNC: 3.2 MG/DL — SIGNIFICANT CHANGE UP (ref 2.5–4.5)
PLATELET # BLD AUTO: 186 K/UL — SIGNIFICANT CHANGE UP (ref 150–400)
POTASSIUM SERPL-MCNC: 3.9 MMOL/L — SIGNIFICANT CHANGE UP (ref 3.5–5.3)
POTASSIUM SERPL-SCNC: 3.9 MMOL/L — SIGNIFICANT CHANGE UP (ref 3.5–5.3)
PROCALCITONIN SERPL-MCNC: 0.04 NG/ML — SIGNIFICANT CHANGE UP (ref 0.02–0.1)
PROT SERPL-MCNC: 6.3 GM/DL — SIGNIFICANT CHANGE UP (ref 6–8.3)
RBC # BLD: 3.14 M/UL — LOW (ref 4.2–5.8)
RBC # FLD: 14.4 % — SIGNIFICANT CHANGE UP (ref 10.3–14.5)
SODIUM SERPL-SCNC: 131 MMOL/L — LOW (ref 135–145)
THC UR QL: NEGATIVE — SIGNIFICANT CHANGE UP
WBC # BLD: 5.84 K/UL — SIGNIFICANT CHANGE UP (ref 3.8–10.5)
WBC # FLD AUTO: 5.84 K/UL — SIGNIFICANT CHANGE UP (ref 3.8–10.5)

## 2022-02-27 PROCEDURE — 99233 SBSQ HOSP IP/OBS HIGH 50: CPT

## 2022-02-27 RX ORDER — ACETAMINOPHEN 500 MG
650 TABLET ORAL EVERY 6 HOURS
Refills: 0 | Status: DISCONTINUED | OUTPATIENT
Start: 2022-02-27 | End: 2022-03-03

## 2022-02-27 RX ORDER — ONDANSETRON 8 MG/1
4 TABLET, FILM COATED ORAL EVERY 8 HOURS
Refills: 0 | Status: DISCONTINUED | OUTPATIENT
Start: 2022-02-27 | End: 2022-03-03

## 2022-02-27 RX ORDER — TRAMADOL HYDROCHLORIDE 50 MG/1
50 TABLET ORAL EVERY 6 HOURS
Refills: 0 | Status: DISCONTINUED | OUTPATIENT
Start: 2022-02-27 | End: 2022-03-03

## 2022-02-27 RX ADMIN — Medication 120 MILLIGRAM(S): at 05:15

## 2022-02-27 RX ADMIN — TRAMADOL HYDROCHLORIDE 50 MILLIGRAM(S): 50 TABLET ORAL at 22:42

## 2022-02-27 RX ADMIN — OLANZAPINE 20 MILLIGRAM(S): 15 TABLET, FILM COATED ORAL at 12:04

## 2022-02-27 RX ADMIN — CITALOPRAM 30 MILLIGRAM(S): 10 TABLET, FILM COATED ORAL at 12:04

## 2022-02-27 RX ADMIN — Medication 600 MILLIGRAM(S): at 14:26

## 2022-02-27 RX ADMIN — ATORVASTATIN CALCIUM 20 MILLIGRAM(S): 80 TABLET, FILM COATED ORAL at 21:42

## 2022-02-27 RX ADMIN — CLOPIDOGREL BISULFATE 75 MILLIGRAM(S): 75 TABLET, FILM COATED ORAL at 12:04

## 2022-02-27 RX ADMIN — DIVALPROEX SODIUM 500 MILLIGRAM(S): 500 TABLET, DELAYED RELEASE ORAL at 21:39

## 2022-02-27 RX ADMIN — Medication 600 MILLIGRAM(S): at 05:15

## 2022-02-27 RX ADMIN — Medication 81 MILLIGRAM(S): at 12:04

## 2022-02-27 RX ADMIN — LACTULOSE 10 GRAM(S): 10 SOLUTION ORAL at 12:04

## 2022-02-27 RX ADMIN — DIVALPROEX SODIUM 500 MILLIGRAM(S): 500 TABLET, DELAYED RELEASE ORAL at 08:00

## 2022-02-27 RX ADMIN — Medication 600 MILLIGRAM(S): at 21:40

## 2022-02-27 RX ADMIN — Medication 1 TABLET(S): at 12:03

## 2022-02-27 RX ADMIN — TRAMADOL HYDROCHLORIDE 50 MILLIGRAM(S): 50 TABLET ORAL at 21:42

## 2022-02-28 LAB
ANION GAP SERPL CALC-SCNC: 5 MMOL/L — SIGNIFICANT CHANGE UP (ref 5–17)
BUN SERPL-MCNC: 8 MG/DL — SIGNIFICANT CHANGE UP (ref 7–23)
CALCIUM SERPL-MCNC: 8.6 MG/DL — SIGNIFICANT CHANGE UP (ref 8.5–10.1)
CHLORIDE SERPL-SCNC: 94 MMOL/L — LOW (ref 96–108)
CO2 SERPL-SCNC: 30 MMOL/L — SIGNIFICANT CHANGE UP (ref 22–31)
CREAT SERPL-MCNC: 0.4 MG/DL — LOW (ref 0.5–1.3)
GLUCOSE SERPL-MCNC: 87 MG/DL — SIGNIFICANT CHANGE UP (ref 70–99)
HCT VFR BLD CALC: 30.8 % — LOW (ref 39–50)
HGB BLD-MCNC: 10.8 G/DL — LOW (ref 13–17)
MCHC RBC-ENTMCNC: 32.1 PG — SIGNIFICANT CHANGE UP (ref 27–34)
MCHC RBC-ENTMCNC: 35.1 G/DL — SIGNIFICANT CHANGE UP (ref 32–36)
MCV RBC AUTO: 91.7 FL — SIGNIFICANT CHANGE UP (ref 80–100)
NRBC # BLD: 0 /100 WBCS — SIGNIFICANT CHANGE UP (ref 0–0)
PLATELET # BLD AUTO: 216 K/UL — SIGNIFICANT CHANGE UP (ref 150–400)
POTASSIUM SERPL-MCNC: 4.3 MMOL/L — SIGNIFICANT CHANGE UP (ref 3.5–5.3)
POTASSIUM SERPL-SCNC: 4.3 MMOL/L — SIGNIFICANT CHANGE UP (ref 3.5–5.3)
RBC # BLD: 3.36 M/UL — LOW (ref 4.2–5.8)
RBC # FLD: 14.3 % — SIGNIFICANT CHANGE UP (ref 10.3–14.5)
SODIUM SERPL-SCNC: 129 MMOL/L — LOW (ref 135–145)
WBC # BLD: 6.52 K/UL — SIGNIFICANT CHANGE UP (ref 3.8–10.5)
WBC # FLD AUTO: 6.52 K/UL — SIGNIFICANT CHANGE UP (ref 3.8–10.5)

## 2022-02-28 PROCEDURE — 99232 SBSQ HOSP IP/OBS MODERATE 35: CPT

## 2022-02-28 RX ORDER — PIPERACILLIN AND TAZOBACTAM 4; .5 G/20ML; G/20ML
3.38 INJECTION, POWDER, LYOPHILIZED, FOR SOLUTION INTRAVENOUS ONCE
Refills: 0 | Status: COMPLETED | OUTPATIENT
Start: 2022-02-28 | End: 2022-02-28

## 2022-02-28 RX ORDER — PIPERACILLIN AND TAZOBACTAM 4; .5 G/20ML; G/20ML
3.38 INJECTION, POWDER, LYOPHILIZED, FOR SOLUTION INTRAVENOUS EVERY 8 HOURS
Refills: 0 | Status: DISCONTINUED | OUTPATIENT
Start: 2022-02-28 | End: 2022-03-03

## 2022-02-28 RX ADMIN — CITALOPRAM 30 MILLIGRAM(S): 10 TABLET, FILM COATED ORAL at 12:39

## 2022-02-28 RX ADMIN — Medication 81 MILLIGRAM(S): at 12:45

## 2022-02-28 RX ADMIN — CLOPIDOGREL BISULFATE 75 MILLIGRAM(S): 75 TABLET, FILM COATED ORAL at 12:45

## 2022-02-28 RX ADMIN — ATORVASTATIN CALCIUM 20 MILLIGRAM(S): 80 TABLET, FILM COATED ORAL at 21:52

## 2022-02-28 RX ADMIN — PIPERACILLIN AND TAZOBACTAM 200 GRAM(S): 4; .5 INJECTION, POWDER, LYOPHILIZED, FOR SOLUTION INTRAVENOUS at 17:50

## 2022-02-28 RX ADMIN — PIPERACILLIN AND TAZOBACTAM 25 GRAM(S): 4; .5 INJECTION, POWDER, LYOPHILIZED, FOR SOLUTION INTRAVENOUS at 21:54

## 2022-02-28 RX ADMIN — OLANZAPINE 20 MILLIGRAM(S): 15 TABLET, FILM COATED ORAL at 12:38

## 2022-02-28 RX ADMIN — LACTULOSE 10 GRAM(S): 10 SOLUTION ORAL at 12:44

## 2022-02-28 RX ADMIN — DIVALPROEX SODIUM 500 MILLIGRAM(S): 500 TABLET, DELAYED RELEASE ORAL at 08:11

## 2022-02-28 RX ADMIN — Medication 120 MILLIGRAM(S): at 05:47

## 2022-02-28 RX ADMIN — Medication 1 TABLET(S): at 12:47

## 2022-02-28 RX ADMIN — DIVALPROEX SODIUM 500 MILLIGRAM(S): 500 TABLET, DELAYED RELEASE ORAL at 21:04

## 2022-02-28 RX ADMIN — Medication 600 MILLIGRAM(S): at 05:47

## 2022-03-01 LAB
ANION GAP SERPL CALC-SCNC: 4 MMOL/L — LOW (ref 5–17)
BUN SERPL-MCNC: 12 MG/DL — SIGNIFICANT CHANGE UP (ref 7–23)
CALCIUM SERPL-MCNC: 8.4 MG/DL — LOW (ref 8.5–10.1)
CHLORIDE SERPL-SCNC: 98 MMOL/L — SIGNIFICANT CHANGE UP (ref 96–108)
CO2 SERPL-SCNC: 30 MMOL/L — SIGNIFICANT CHANGE UP (ref 22–31)
CREAT SERPL-MCNC: 0.52 MG/DL — SIGNIFICANT CHANGE UP (ref 0.5–1.3)
EGFR: 105 ML/MIN/1.73M2 — SIGNIFICANT CHANGE UP
GLUCOSE SERPL-MCNC: 89 MG/DL — SIGNIFICANT CHANGE UP (ref 70–99)
HCT VFR BLD CALC: 32.1 % — LOW (ref 39–50)
HGB BLD-MCNC: 11 G/DL — LOW (ref 13–17)
MCHC RBC-ENTMCNC: 31.3 PG — SIGNIFICANT CHANGE UP (ref 27–34)
MCHC RBC-ENTMCNC: 34.3 G/DL — SIGNIFICANT CHANGE UP (ref 32–36)
MCV RBC AUTO: 91.5 FL — SIGNIFICANT CHANGE UP (ref 80–100)
NRBC # BLD: 0 /100 WBCS — SIGNIFICANT CHANGE UP (ref 0–0)
PLATELET # BLD AUTO: 242 K/UL — SIGNIFICANT CHANGE UP (ref 150–400)
POTASSIUM SERPL-MCNC: 4 MMOL/L — SIGNIFICANT CHANGE UP (ref 3.5–5.3)
POTASSIUM SERPL-SCNC: 4 MMOL/L — SIGNIFICANT CHANGE UP (ref 3.5–5.3)
RBC # BLD: 3.51 M/UL — LOW (ref 4.2–5.8)
RBC # FLD: 14.1 % — SIGNIFICANT CHANGE UP (ref 10.3–14.5)
SODIUM SERPL-SCNC: 132 MMOL/L — LOW (ref 135–145)
WBC # BLD: 7.63 K/UL — SIGNIFICANT CHANGE UP (ref 3.8–10.5)
WBC # FLD AUTO: 7.63 K/UL — SIGNIFICANT CHANGE UP (ref 3.8–10.5)

## 2022-03-01 PROCEDURE — 99232 SBSQ HOSP IP/OBS MODERATE 35: CPT

## 2022-03-01 RX ADMIN — LACTULOSE 10 GRAM(S): 10 SOLUTION ORAL at 12:12

## 2022-03-01 RX ADMIN — PIPERACILLIN AND TAZOBACTAM 25 GRAM(S): 4; .5 INJECTION, POWDER, LYOPHILIZED, FOR SOLUTION INTRAVENOUS at 15:53

## 2022-03-01 RX ADMIN — DIVALPROEX SODIUM 500 MILLIGRAM(S): 500 TABLET, DELAYED RELEASE ORAL at 08:18

## 2022-03-01 RX ADMIN — DIVALPROEX SODIUM 500 MILLIGRAM(S): 500 TABLET, DELAYED RELEASE ORAL at 21:08

## 2022-03-01 RX ADMIN — Medication 1 TABLET(S): at 12:12

## 2022-03-01 RX ADMIN — Medication 120 MILLIGRAM(S): at 06:38

## 2022-03-01 RX ADMIN — CLOPIDOGREL BISULFATE 75 MILLIGRAM(S): 75 TABLET, FILM COATED ORAL at 12:12

## 2022-03-01 RX ADMIN — PIPERACILLIN AND TAZOBACTAM 25 GRAM(S): 4; .5 INJECTION, POWDER, LYOPHILIZED, FOR SOLUTION INTRAVENOUS at 06:38

## 2022-03-01 RX ADMIN — PIPERACILLIN AND TAZOBACTAM 25 GRAM(S): 4; .5 INJECTION, POWDER, LYOPHILIZED, FOR SOLUTION INTRAVENOUS at 21:12

## 2022-03-01 RX ADMIN — Medication 81 MILLIGRAM(S): at 12:12

## 2022-03-01 RX ADMIN — ATORVASTATIN CALCIUM 20 MILLIGRAM(S): 80 TABLET, FILM COATED ORAL at 21:08

## 2022-03-01 RX ADMIN — CITALOPRAM 30 MILLIGRAM(S): 10 TABLET, FILM COATED ORAL at 12:12

## 2022-03-01 RX ADMIN — OLANZAPINE 20 MILLIGRAM(S): 15 TABLET, FILM COATED ORAL at 12:12

## 2022-03-01 NOTE — PHYSICAL THERAPY INITIAL EVALUATION ADULT - PATIENT/FAMILY AGREES WITH PLAN
Prior to previous living(group home) . Home PT. As per KIARA Proctor from [facility], he has a rollator./yes

## 2022-03-01 NOTE — DIETITIAN INITIAL EVALUATION ADULT. - PERTINENT LABORATORY DATA
03-01 Na132 mmol/L<L> Glu 89 mg/dL K+ 4.0 mmol/L Cr  0.52 mg/dL BUN 12 mg/dL 02-27 Phos 3.2 mg/dL 02-27 Alb 2.6 g/dL<L>

## 2022-03-01 NOTE — PHYSICAL THERAPY INITIAL EVALUATION ADULT - PATIENT/FAMILY/SIGNIFICANT OTHER GOALS STATEMENT, PT EVAL
TO get stronger to walk better Elidel Counseling: Patient may experience a mild burning sensation during topical application. Elidel is not approved in children less than 2 years of age. There have been case reports of hematologic and skin malignancies in patients using topical calcineurin inhibitors although causality is questionable.

## 2022-03-01 NOTE — DIETITIAN NUTRITION RISK NOTIFICATION - TREATMENT: THE FOLLOWING DIET HAS BEEN RECOMMENDED
Diet, Soft and Bite Sized:   Supplement Feeding Modality:  Oral  Ensure Enlive Cans or Servings Per Day:  1       Frequency:  Two Times a day (03-01-22 @ 11:52) [Pending Verification By Attending]  Diet, DASH/TLC:   Sodium & Cholesterol Restricted (02-26-22 @ 23:41) [Active]

## 2022-03-01 NOTE — PHYSICAL THERAPY INITIAL EVALUATION ADULT - BALANCE TRAINING, PT EVAL
Pt will improve static & dynamic standing balance to Good using [Rolling walker]  to perform ADL, Gait independently in 2 weeks

## 2022-03-01 NOTE — DIETITIAN INITIAL EVALUATION ADULT. - OTHER CALCULATIONS
Ht (cm) 177.8    Wt (kg):  59.6 (3/1)   BMI:    18.8   IBW:  75.5  kg  %IBW:  79%  UBW:   74.8 kg  %UBW: 79%

## 2022-03-01 NOTE — DIETITIAN INITIAL EVALUATION ADULT. - OTHER INFO
Unable to interview pt due to cognitive impairment (pt c cognitive impairment, schizophrenia); aide bedside provided limited information.  Per medical record pt resides at group home;  admitted c RLE leg pain, swelling. Aide reports no diet restrictions at home but pt doesn't have teeth so altered consistency for ease of eating is appropriate; also receptive to nutritional supplement. Unable to provide any wt. hx; per ED visit (3/2021) wt loss as noted.  No reports of any N/V/C/D or swallowing difficulty.

## 2022-03-01 NOTE — PHYSICAL THERAPY INITIAL EVALUATION ADULT - ADDITIONAL COMMENTS
As per KIARA Proctor from group home(at bed side), Pt lives in group home, he was independent in all functional mobility using a Rollator, needs sup for ADLs.

## 2022-03-01 NOTE — DIETITIAN NUTRITION RISK NOTIFICATION - MUSCLE MASS (LOSS OF MUSCLE)
----- Message from Cristina Fregoso MD sent at 2018  4:23 PM CDT -----  Please add at 11:15 on April 19th for 1 mo well visit  and may 3rd 11:15 for 6 week well visit   Mom is aware    Temples.../Clavicles.../Shoulders.../Thigh.../Calf...

## 2022-03-01 NOTE — PROGRESS NOTE ADULT - NUTRITIONAL ASSESSMENT
This patient has been assessed with a concern for Malnutrition and has been determined to have a diagnosis/diagnoses of Severe protein-calorie malnutrition and Underweight (BMI < 19).    This patient is being managed with:   Diet DASH/TLC-  Sodium & Cholesterol Restricted  Entered: Feb 26 2022 11:41PM    The following pending diet order is being considered for treatment of Severe protein-calorie malnutrition and Underweight (BMI < 19):  Diet Soft and Bite Sized-  Supplement Feeding Modality:  Oral  Ensure Enlive Cans or Servings Per Day:  1       Frequency:  Two Times a day  Entered: Mar  1 2022 11:52AM

## 2022-03-01 NOTE — PHYSICAL THERAPY INITIAL EVALUATION ADULT - CRITERIA FOR SKILLED THERAPEUTIC INTERVENTIONS
Prior to previous living(group home) . Home PT. As per KIARA Proctor from group [place], he has a rollator./impairments found/functional limitations in following categories/risk reduction/prevention/rehab potential/therapy frequency/predicted duration of therapy intervention/anticipated equipment needs at discharge/anticipated discharge recommendation

## 2022-03-02 PROCEDURE — 99239 HOSP IP/OBS DSCHRG MGMT >30: CPT

## 2022-03-02 RX ORDER — CEPHALEXIN 500 MG
1 CAPSULE ORAL
Qty: 40 | Refills: 0
Start: 2022-03-02 | End: 2022-03-11

## 2022-03-02 RX ORDER — DILTIAZEM HCL 120 MG
0 CAPSULE, EXT RELEASE 24 HR ORAL
Qty: 0 | Refills: 0 | DISCHARGE

## 2022-03-02 RX ADMIN — DIVALPROEX SODIUM 500 MILLIGRAM(S): 500 TABLET, DELAYED RELEASE ORAL at 22:05

## 2022-03-02 RX ADMIN — PIPERACILLIN AND TAZOBACTAM 25 GRAM(S): 4; .5 INJECTION, POWDER, LYOPHILIZED, FOR SOLUTION INTRAVENOUS at 15:00

## 2022-03-02 RX ADMIN — CLOPIDOGREL BISULFATE 75 MILLIGRAM(S): 75 TABLET, FILM COATED ORAL at 12:11

## 2022-03-02 RX ADMIN — Medication 81 MILLIGRAM(S): at 12:11

## 2022-03-02 RX ADMIN — PIPERACILLIN AND TAZOBACTAM 25 GRAM(S): 4; .5 INJECTION, POWDER, LYOPHILIZED, FOR SOLUTION INTRAVENOUS at 05:34

## 2022-03-02 RX ADMIN — ATORVASTATIN CALCIUM 20 MILLIGRAM(S): 80 TABLET, FILM COATED ORAL at 22:05

## 2022-03-02 RX ADMIN — Medication 1 TABLET(S): at 12:11

## 2022-03-02 RX ADMIN — Medication 120 MILLIGRAM(S): at 05:34

## 2022-03-02 RX ADMIN — LACTULOSE 10 GRAM(S): 10 SOLUTION ORAL at 12:11

## 2022-03-02 RX ADMIN — DIVALPROEX SODIUM 500 MILLIGRAM(S): 500 TABLET, DELAYED RELEASE ORAL at 09:22

## 2022-03-02 RX ADMIN — PIPERACILLIN AND TAZOBACTAM 25 GRAM(S): 4; .5 INJECTION, POWDER, LYOPHILIZED, FOR SOLUTION INTRAVENOUS at 22:05

## 2022-03-02 RX ADMIN — CITALOPRAM 30 MILLIGRAM(S): 10 TABLET, FILM COATED ORAL at 12:11

## 2022-03-02 RX ADMIN — OLANZAPINE 20 MILLIGRAM(S): 15 TABLET, FILM COATED ORAL at 12:11

## 2022-03-02 NOTE — PROGRESS NOTE ADULT - SUBJECTIVE AND OBJECTIVE BOX
Patient is a 76y old  Male who presents with a chief complaint of Difficulty ambulating (27 Feb 2022 13:32)      INTERVAL HPI/OVERNIGHT EVENTS: none     MEDICATIONS  (STANDING):  aspirin enteric coated 81 milliGRAM(s) Oral daily  atorvastatin 20 milliGRAM(s) Oral at bedtime  citalopram 30 milliGRAM(s) Oral daily  clopidogrel Tablet 75 milliGRAM(s) Oral daily  diltiazem    milliGRAM(s) Oral daily  diVALproex  milliGRAM(s) Oral <User Schedule>  lactulose Syrup 10 Gram(s) Oral daily  multivitamin 1 Tablet(s) Oral daily  OLANZapine 20 milliGRAM(s) Oral daily  piperacillin/tazobactam IVPB.. 3.375 Gram(s) IV Intermittent every 8 hours    MEDICATIONS  (PRN):  acetaminophen     Tablet .. 650 milliGRAM(s) Oral every 6 hours PRN Temp greater or equal to 38C (100.4F), Mild Pain (1 - 3)  ALBUTerol    90 MICROgram(s) HFA Inhaler 2 Puff(s) Inhalation every 6 hours PRN Shortness of Breath and/or Wheezing  ondansetron Injectable 4 milliGRAM(s) IV Push every 8 hours PRN Nausea and/or Vomiting  traMADol 50 milliGRAM(s) Oral every 6 hours PRN Moderate Pain (4 - 6)      Allergies    betadine (Unknown)  iodine (Unknown)    Intolerances        REVIEW OF SYSTEMS:  CONSTITUTIONAL: No fever, weight loss, or fatigue  EYES: No eye pain, visual disturbances, or discharge  ENMT:  No difficulty hearing, tinnitus, vertigo; No sinus or throat pain  NECK: No pain or stiffness  BREASTS: No pain, masses, or nipple discharge  RESPIRATORY: No cough, wheezing, chills or hemoptysis; No shortness of breath  CARDIOVASCULAR: No chest pain, palpitations, dizziness, or leg swelling  GASTROINTESTINAL: No abdominal or epigastric pain. No nausea, vomiting, or hematemesis; No diarrhea or constipation. No melena or hematochezia.  GENITOURINARY: No dysuria, frequency, hematuria, or incontinence  NEUROLOGICAL: No headaches, memory loss, loss of strength, numbness, or tremors  SKIN: No itching, burning, rashes, or lesions   LYMPH NODES: No enlarged glands  ENDOCRINE: No heat or cold intolerance; No hair loss  MUSCULOSKELETAL: No joint pain or swelling; No muscle, back, or extremity pain  PSYCHIATRIC: No depression, anxiety, mood swings, or difficulty sleeping  HEME/LYMPH: No easy bruising, or bleeding gums  ALLERGY AND IMMUNOLOGIC: No hives or eczema    Vital Signs Last 24 Hrs  T(C): 36.8 (01 Mar 2022 06:11), Max: 37.3 (28 Feb 2022 23:27)  T(F): 98.3 (01 Mar 2022 06:11), Max: 99.1 (28 Feb 2022 23:27)  HR: 107 (01 Mar 2022 08:02) (57 - 107)  BP: 130/83 (01 Mar 2022 06:11) (108/61 - 130/83)  BP(mean): --  RR: 18 (01 Mar 2022 06:11) (17 - 18)  SpO2: 94% (01 Mar 2022 08:02) (94% - 95%)    PHYSICAL EXAM:  GENERAL: NAD, well-groomed, well-developed  HEAD:  Atraumatic, Normocephalic  EYES: EOMI, PERRLA, conjunctiva and sclera clear  ENMT: No tonsillar erythema, exudates, or enlargement; Moist mucous membranes, Good dentition, No lesions  NECK: Supple, No JVD, Normal thyroid  NERVOUS SYSTEM:  Alert & Oriented X3, Good concentration; Motor Strength 5/5 B/L upper and lower extremities; DTRs 2+ intact and symmetric  CHEST/LUNG: Clear to percussion bilaterally; No rales, rhonchi, wheezing, or rubs  HEART: Regular rate and rhythm; No murmurs, rubs, or gallops  ABDOMEN: Soft, Nontender, Nondistended; Bowel sounds present  EXTREMITIES:  2+ Peripheral Pulses, No clubbing, cyanosis, or edema  LYMPH: No lymphadenopathy noted  SKIN: RLE cellulitis below the knee. no purulence     LABS:                                   11.0   7.63  )-----------( 242      ( 01 Mar 2022 07:54 )             32.1     03-01    132<L>  |  98  |  12  ----------------------------<  89  4.0   |  30  |  0.52    Ca    8.4<L>      01 Mar 2022 07:54                  RADIOLOGY & ADDITIONAL TESTS:    Imaging Personally Reviewed:  [x ] YES  [ ] NO    Consultant(s) Notes Reviewed:  [ ] YES  [ ] NO    Care Discussed with Consultants/Other Providers [ ] YES  [ ] NO  
Resting in bed NAD. afebrile hemodynamically stable. no acute events. no new complaints.  GENERAL: Pt lying in bed comfortably in NAD, very drowsy  HEENT:  Atraumatic, pupils pin-point sluggish but reactive, conjunctiva and sclera clear, dry MM  NECK: Supple  CHEST/LUNG: Clear to auscultation bilaterally  HEART: Regular rate and rhythm;  ABDOMEN: Bowel sounds present; Soft, Nontender, Nondistended.    EXTREMITIES:  2+ Peripheral Pulses, RLE 2++ edema, patches to bruises/ecchymosis, warm  NEUROLOGICAL: Very drowsy, rousable but only moans and falls back to sleep  MSK: RLE as above  SKIN: RLE as above  
Patient is a 76y old  Male who presents with a chief complaint of Difficulty ambulating (27 Feb 2022 13:32)      INTERVAL HPI/OVERNIGHT EVENTS: none     MEDICATIONS  (STANDING):  aspirin enteric coated 81 milliGRAM(s) Oral daily  atorvastatin 20 milliGRAM(s) Oral at bedtime  citalopram 30 milliGRAM(s) Oral daily  clopidogrel Tablet 75 milliGRAM(s) Oral daily  diltiazem    milliGRAM(s) Oral daily  diVALproex  milliGRAM(s) Oral <User Schedule>  lactulose Syrup 10 Gram(s) Oral daily  multivitamin 1 Tablet(s) Oral daily  OLANZapine 20 milliGRAM(s) Oral daily  piperacillin/tazobactam IVPB. 3.375 Gram(s) IV Intermittent once  piperacillin/tazobactam IVPB.. 3.375 Gram(s) IV Intermittent every 8 hours    MEDICATIONS  (PRN):  acetaminophen     Tablet .. 650 milliGRAM(s) Oral every 6 hours PRN Temp greater or equal to 38C (100.4F), Mild Pain (1 - 3)  ALBUTerol    90 MICROgram(s) HFA Inhaler 2 Puff(s) Inhalation every 6 hours PRN Shortness of Breath and/or Wheezing  ondansetron Injectable 4 milliGRAM(s) IV Push every 8 hours PRN Nausea and/or Vomiting  traMADol 50 milliGRAM(s) Oral every 6 hours PRN Moderate Pain (4 - 6)      Allergies    betadine (Unknown)  iodine (Unknown)    Intolerances        REVIEW OF SYSTEMS:  CONSTITUTIONAL: No fever, weight loss, or fatigue  EYES: No eye pain, visual disturbances, or discharge  ENMT:  No difficulty hearing, tinnitus, vertigo; No sinus or throat pain  NECK: No pain or stiffness  BREASTS: No pain, masses, or nipple discharge  RESPIRATORY: No cough, wheezing, chills or hemoptysis; No shortness of breath  CARDIOVASCULAR: No chest pain, palpitations, dizziness, or leg swelling  GASTROINTESTINAL: No abdominal or epigastric pain. No nausea, vomiting, or hematemesis; No diarrhea or constipation. No melena or hematochezia.  GENITOURINARY: No dysuria, frequency, hematuria, or incontinence  NEUROLOGICAL: No headaches, memory loss, loss of strength, numbness, or tremors  SKIN: No itching, burning, rashes, or lesions   LYMPH NODES: No enlarged glands  ENDOCRINE: No heat or cold intolerance; No hair loss  MUSCULOSKELETAL: No joint pain or swelling; No muscle, back, or extremity pain  PSYCHIATRIC: No depression, anxiety, mood swings, or difficulty sleeping  HEME/LYMPH: No easy bruising, or bleeding gums  ALLERGY AND IMMUNOLOGIC: No hives or eczema    Vital Signs Last 24 Hrs  T(C): 36.7 (28 Feb 2022 12:09), Max: 36.7 (27 Feb 2022 17:25)  T(F): 98.1 (28 Feb 2022 12:09), Max: 98.1 (27 Feb 2022 17:25)  HR: 57 (28 Feb 2022 12:09) (57 - 80)  BP: 120/72 (28 Feb 2022 12:09) (120/72 - 151/83)  BP(mean): --  RR: 17 (28 Feb 2022 12:09) (17 - 18)  SpO2: 95% (28 Feb 2022 12:09) (94% - 96%)    PHYSICAL EXAM:  GENERAL: NAD, well-groomed, well-developed  HEAD:  Atraumatic, Normocephalic  EYES: EOMI, PERRLA, conjunctiva and sclera clear  ENMT: No tonsillar erythema, exudates, or enlargement; Moist mucous membranes, Good dentition, No lesions  NECK: Supple, No JVD, Normal thyroid  NERVOUS SYSTEM:  Alert & Oriented X3, Good concentration; Motor Strength 5/5 B/L upper and lower extremities; DTRs 2+ intact and symmetric  CHEST/LUNG: Clear to percussion bilaterally; No rales, rhonchi, wheezing, or rubs  HEART: Regular rate and rhythm; No murmurs, rubs, or gallops  ABDOMEN: Soft, Nontender, Nondistended; Bowel sounds present  EXTREMITIES:  2+ Peripheral Pulses, No clubbing, cyanosis, or edema  LYMPH: No lymphadenopathy noted  SKIN: RLE cellulitis below the knee. no purulence     LABS:                        10.8   6.52  )-----------( 216      ( 28 Feb 2022 06:36 )             30.8     02-28    129<L>  |  94<L>  |  8   ----------------------------<  87  4.3   |  30  |  0.40<L>    Ca    8.6      28 Feb 2022 06:36  Phos  3.2     02-27  Mg     1.9     02-27    TPro  6.3  /  Alb  2.6<L>  /  TBili  0.3  /  DBili  x   /  AST  17  /  ALT  19  /  AlkPhos  72  02-27    PT/INR - ( 26 Feb 2022 20:10 )   PT: 15.7 sec;   INR: 1.32 ratio         PTT - ( 26 Feb 2022 20:10 )  PTT:38.0 sec    CAPILLARY BLOOD GLUCOSE          RADIOLOGY & ADDITIONAL TESTS:    Imaging Personally Reviewed:  [x ] YES  [ ] NO    Consultant(s) Notes Reviewed:  [ ] YES  [ ] NO    Care Discussed with Consultants/Other Providers [ ] YES  [ ] NO  
Patient is a 76y old  Male who presents with a chief complaint of Difficulty ambulating (27 Feb 2022 13:32)      INTERVAL HPI/OVERNIGHT EVENTS: none     MEDICATIONS  (STANDING):  aspirin enteric coated 81 milliGRAM(s) Oral daily  atorvastatin 20 milliGRAM(s) Oral at bedtime  citalopram 30 milliGRAM(s) Oral daily  clopidogrel Tablet 75 milliGRAM(s) Oral daily  diltiazem    milliGRAM(s) Oral daily  diVALproex  milliGRAM(s) Oral <User Schedule>  lactulose Syrup 10 Gram(s) Oral daily  multivitamin 1 Tablet(s) Oral daily  OLANZapine 20 milliGRAM(s) Oral daily  piperacillin/tazobactam IVPB.. 3.375 Gram(s) IV Intermittent every 8 hours    MEDICATIONS  (PRN):  acetaminophen     Tablet .. 650 milliGRAM(s) Oral every 6 hours PRN Temp greater or equal to 38C (100.4F), Mild Pain (1 - 3)  ALBUTerol    90 MICROgram(s) HFA Inhaler 2 Puff(s) Inhalation every 6 hours PRN Shortness of Breath and/or Wheezing  ondansetron Injectable 4 milliGRAM(s) IV Push every 8 hours PRN Nausea and/or Vomiting  traMADol 50 milliGRAM(s) Oral every 6 hours PRN Moderate Pain (4 - 6)      Allergies    betadine (Unknown)  iodine (Unknown)    Intolerances        REVIEW OF SYSTEMS:  CONSTITUTIONAL: No fever, weight loss, or fatigue  EYES: No eye pain, visual disturbances, or discharge  ENMT:  No difficulty hearing, tinnitus, vertigo; No sinus or throat pain  NECK: No pain or stiffness  BREASTS: No pain, masses, or nipple discharge  RESPIRATORY: No cough, wheezing, chills or hemoptysis; No shortness of breath  CARDIOVASCULAR: No chest pain, palpitations, dizziness, or leg swelling  GASTROINTESTINAL: No abdominal or epigastric pain. No nausea, vomiting, or hematemesis; No diarrhea or constipation. No melena or hematochezia.  GENITOURINARY: No dysuria, frequency, hematuria, or incontinence  NEUROLOGICAL: No headaches, memory loss, loss of strength, numbness, or tremors  SKIN: No itching, burning, rashes, or lesions   LYMPH NODES: No enlarged glands  ENDOCRINE: No heat or cold intolerance; No hair loss  MUSCULOSKELETAL: No joint pain or swelling; No muscle, back, or extremity pain  PSYCHIATRIC: No depression, anxiety, mood swings, or difficulty sleeping  HEME/LYMPH: No easy bruising, or bleeding gums  ALLERGY AND IMMUNOLOGIC: No hives or eczema    Vital Signs Last 24 Hrs  T(C): 36.8 (01 Mar 2022 06:11), Max: 37.3 (28 Feb 2022 23:27)  T(F): 98.3 (01 Mar 2022 06:11), Max: 99.1 (28 Feb 2022 23:27)  HR: 107 (01 Mar 2022 08:02) (57 - 107)  BP: 130/83 (01 Mar 2022 06:11) (108/61 - 130/83)  BP(mean): --  RR: 18 (01 Mar 2022 06:11) (17 - 18)  SpO2: 94% (01 Mar 2022 08:02) (94% - 95%)    PHYSICAL EXAM:  GENERAL: NAD, well-groomed, well-developed  HEAD:  Atraumatic, Normocephalic  EYES: EOMI, PERRLA, conjunctiva and sclera clear  ENMT: No tonsillar erythema, exudates, or enlargement; Moist mucous membranes, Good dentition, No lesions  NECK: Supple, No JVD, Normal thyroid  NERVOUS SYSTEM:  Alert & Oriented X3, Good concentration; Motor Strength 5/5 B/L upper and lower extremities; DTRs 2+ intact and symmetric  CHEST/LUNG: Clear to percussion bilaterally; No rales, rhonchi, wheezing, or rubs  HEART: Regular rate and rhythm; No murmurs, rubs, or gallops  ABDOMEN: Soft, Nontender, Nondistended; Bowel sounds present  EXTREMITIES:  2+ Peripheral Pulses, No clubbing, cyanosis, or edema  LYMPH: No lymphadenopathy noted  SKIN: RLE cellulitis below the knee. no purulence     LABS:                                   11.0   7.63  )-----------( 242      ( 01 Mar 2022 07:54 )             32.1     03-01    132<L>  |  98  |  12  ----------------------------<  89  4.0   |  30  |  0.52    Ca    8.4<L>      01 Mar 2022 07:54                  RADIOLOGY & ADDITIONAL TESTS:    Imaging Personally Reviewed:  [x ] YES  [ ] NO    Consultant(s) Notes Reviewed:  [ ] YES  [ ] NO    Care Discussed with Consultants/Other Providers [ ] YES  [ ] NO

## 2022-03-02 NOTE — PROGRESS NOTE ADULT - NUTRITIONAL ASSESSMENT
This patient has been assessed with a concern for Malnutrition and has been determined to have a diagnosis/diagnoses of Severe protein-calorie malnutrition and Underweight (BMI < 19).    This patient is being managed with:   Diet Soft and Bite Sized-  Supplement Feeding Modality:  Oral  Ensure Enlive Cans or Servings Per Day:  1       Frequency:  Two Times a day  Entered: Mar  1 2022 11:52AM

## 2022-03-02 NOTE — DISCHARGE NOTE PROVIDER - HOSPITAL COURSE
75 y/o Male with PMHx of HTN, COPD, schizophrenia, mild intellectual disability [facility] 2/2 cellulitis    1) RLE cellulitis   - Xrays neg for fx and US neg for dvt   - changed antibiotics to zosyn and having improvement. ESR 5   - seen by physical therapy and they recommend home pt   - can be discharged on oral keflex for 10 days with close follow up with his pcp     2) Schizophrenia  - c/w Celexa  - c/w Depakote    3) HTN  - c/w Diltiazem    4) COPD  - c/w inhalers prn

## 2022-03-02 NOTE — DISCHARGE NOTE PROVIDER - NSDCCPCAREPLAN_GEN_ALL_CORE_FT
PRINCIPAL DISCHARGE DIAGNOSIS  Diagnosis: Cellulitis of leg, right  Assessment and Plan of Treatment: 77 y/o Male with PMHx of HTN, COPD, schizophrenia, mild intellectual disability [facility] 2/2 cellulitis  1) RLE cellulitis   - Xrays neg for fx and US neg for dvt   - changed antibiotics to zosyn and having improvement. ESR 5   - seen by physical therapy and they recommend home pt   - can be discharged on oral keflex for 10 days with close follow up with his pcp   2) Schizophrenia  - c/w Celexa  - c/w Depakote  3) HTN  - c/w Diltiazem  4) COPD  - c/w inhalers prn

## 2022-03-02 NOTE — PROGRESS NOTE ADULT - ASSESSMENT
75 y/o Male with PMHx of HTN, COPD, schizophrenia, mild intellectual disability [facility], pt being admitted for difficulty ambulating    1) RLE cellulitis   - Xraysand US neg   - changed abx to zosyn and having improvement   - still needs pt consult as he lives on second floor and uses a walker at baseline     2) Schizophrenia, MR  - c/w Celexa  - c/w Depakote    3) HTN  - c/w Diltiazem    4) COPD  - c/w inhalers prn    5) DVT ppx - Lovenox subq 
75 y/o Male with PMHx of HTN, COPD, schizophrenia, mild intellectual disability [facility] 2/2 cellulitis    1) RLE cellulitis   - Xrays neg for fx and US neg for dvt   - changed antibiotics to zosyn and having improvement. ESR 5   - seen by physical therapy and they recommend home pt   - can be discharged on oral keflex for 10 days with close follow up with his pcp     2) Schizophrenia  - c/w Celexa  - c/w Depakote    3) HTN  - c/w Diltiazem    4) COPD  - c/w inhalers prn
 77 y/o Male with PMHx of HTN, COPD, schizophrenia, mild intellectual disability [facility], pt being admitted for difficulty ambulating    1) RLE cellulitis   - Xraysand US neg   - change abx to zosyn   - get pt consult as he lives on second floor     2) Schizophrenia, MR  - c/w Celexa  - c/w Depakote    3) HTN  - c/w Diltiazem    4) COPD  - c/w inhalers prn    5) DVT ppx - Lovenox subq 
 77 y/o Male with PMHx of HTN, COPD, schizophrenia, mild intellectual disability [facility], pt being admitted for difficulty ambulating    1) RLE Pain, swelling difficulty ambulating   - R leg w/ multiple bruises/echymosis; appears at least a few days old  - Xrays pre-shepard neg   - mild superimposed cellulitis; place on PO Clinda for 3 doses however procal (-)  - Pain control  - fall precautions  -Duplex (-) DVT     2) Schizophrenia, MR  - c/w Celexa  - c/w Depakote    3) HTN  - c/w Diltiazem    4) COPD  - c/w inhalers prn    5) DVT ppx - Lovenox subq

## 2022-03-02 NOTE — DISCHARGE NOTE PROVIDER - NSDCMRMEDTOKEN_GEN_ALL_CORE_FT
Aspirin Enteric Coated 81 mg oral delayed release tablet: 1 tab(s) orally once a day  atorvastatin 20 mg oral tablet: 1 tab(s) orally once a day  Breo Ellipta 100 mcg-25 mcg/inh inhalation powder: 1 puff(s) inhaled once a day  CeleXA 20 mg oral tablet: 1 tab(s) orally once a day  cephalexin 500 mg oral tablet: 1 tab(s) orally 4 times a day   clopidogrel 75 mg oral tablet: 1 tab(s) orally once a day  Colace 100 mg oral capsule: 1 cap(s) orally 2 times a day  Daily-Camila Men&#x27;s Formula: 400 microgram(s) orally once a day  Depakote  mg oral tablet, extended release: orally 2 times a day  DILTIAZEM 24H ER(CD) 120 MG CP: cap(s) orally once a day  lactulose 10 g/15 mL oral solution: orally once a day  Myrbetriq 25 mg oral tablet, extended release: 1 tab(s) orally once a day  OLANZapine 20 mg oral tablet: 1 tab(s) orally once a day  Ventolin HFA: 200  inhaled every 4 hours, As Needed   Aspirin Enteric Coated 81 mg oral delayed release tablet: 1 tab(s) orally once a day  atorvastatin 20 mg oral tablet: 1 tab(s) orally once a day  Breo Ellipta 100 mcg-25 mcg/inh inhalation powder: 1 puff(s) inhaled once a day  CeleXA 20 mg oral tablet: 1 tab(s) orally once a day  cephalexin 500 mg oral tablet: 1 tab(s) orally 4 times a day   clopidogrel 75 mg oral tablet: 1 tab(s) orally once a day  Colace 100 mg oral capsule: 1 cap(s) orally 2 times a day  Daily-Camila Men&#x27;s Formula: 400 microgram(s) orally once a day  Depakote  mg oral tablet, extended release: orally 2 times a day  DILTIAZEM 24H ER(CD) 120 MG CP: 1 cap(s) orally once a day   lactulose 10 g/15 mL oral solution: orally once a day  Myrbetriq 25 mg oral tablet, extended release: 1 tab(s) orally once a day  OLANZapine 20 mg oral tablet: 1 tab(s) orally once a day  Ventolin HFA: 200  inhaled every 4 hours, As Needed

## 2022-03-03 VITALS
SYSTOLIC BLOOD PRESSURE: 124 MMHG | OXYGEN SATURATION: 94 % | RESPIRATION RATE: 18 BRPM | DIASTOLIC BLOOD PRESSURE: 83 MMHG | HEART RATE: 74 BPM | WEIGHT: 129.85 LBS | TEMPERATURE: 98 F

## 2022-03-03 RX ORDER — CEPHALEXIN 500 MG
1 CAPSULE ORAL
Qty: 40 | Refills: 0
Start: 2022-03-03 | End: 2022-03-12

## 2022-03-03 RX ORDER — DILTIAZEM HCL 120 MG
0 CAPSULE, EXT RELEASE 24 HR ORAL
Qty: 0 | Refills: 0 | DISCHARGE

## 2022-03-03 RX ORDER — DILTIAZEM HCL 120 MG
1 CAPSULE, EXT RELEASE 24 HR ORAL
Qty: 30 | Refills: 0
Start: 2022-03-03 | End: 2022-04-01

## 2022-03-03 RX ORDER — ASPIRIN/CALCIUM CARB/MAGNESIUM 324 MG
1 TABLET ORAL
Qty: 0 | Refills: 0 | DISCHARGE

## 2022-03-03 RX ORDER — ASPIRIN/CALCIUM CARB/MAGNESIUM 324 MG
1 TABLET ORAL
Qty: 30 | Refills: 0
Start: 2022-03-03 | End: 2022-04-01

## 2022-03-03 RX ORDER — ATORVASTATIN CALCIUM 80 MG/1
1 TABLET, FILM COATED ORAL
Qty: 0 | Refills: 0 | DISCHARGE

## 2022-03-03 RX ORDER — CITALOPRAM 10 MG/1
1 TABLET, FILM COATED ORAL
Qty: 0 | Refills: 0 | DISCHARGE

## 2022-03-03 RX ORDER — CITALOPRAM 10 MG/1
1 TABLET, FILM COATED ORAL
Qty: 30 | Refills: 0
Start: 2022-03-03 | End: 2022-04-01

## 2022-03-03 RX ORDER — ATORVASTATIN CALCIUM 80 MG/1
1 TABLET, FILM COATED ORAL
Qty: 30 | Refills: 0
Start: 2022-03-03 | End: 2022-04-01

## 2022-03-03 RX ORDER — OLANZAPINE 15 MG/1
1 TABLET, FILM COATED ORAL
Qty: 30 | Refills: 0
Start: 2022-03-03 | End: 2022-04-01

## 2022-03-03 RX ORDER — OLANZAPINE 15 MG/1
1 TABLET, FILM COATED ORAL
Qty: 0 | Refills: 0 | DISCHARGE

## 2022-03-03 RX ADMIN — PIPERACILLIN AND TAZOBACTAM 25 GRAM(S): 4; .5 INJECTION, POWDER, LYOPHILIZED, FOR SOLUTION INTRAVENOUS at 05:50

## 2022-03-03 RX ADMIN — Medication 120 MILLIGRAM(S): at 05:50

## 2022-03-03 RX ADMIN — DIVALPROEX SODIUM 500 MILLIGRAM(S): 500 TABLET, DELAYED RELEASE ORAL at 09:58

## 2022-03-03 NOTE — DISCHARGE NOTE NURSING/CASE MANAGEMENT/SOCIAL WORK - NSDCPEFALRISK_GEN_ALL_CORE
For information on Fall & Injury Prevention, visit: [email or web address] OR  [email or web address] OR  https://www.cdc.gov/steadi/patient.html

## 2022-03-03 NOTE — DISCHARGE NOTE NURSING/CASE MANAGEMENT/SOCIAL WORK - PATIENT PORTAL LINK FT
You can access the FollowMyHealth Patient Portal offered by [facility] by registering at the following website: [email or web address]. By joining [facility]’s FollowMyHealth portal, you will also be able to view your health information using other applications (apps) compatible with our system.

## 2022-03-05 NOTE — ED PROVIDER NOTE - CPE EDP CARDIAC NORM
Epidural Block    Date/Time: 3/5/2022 2:39 PM  Performed by: Mae Armas MD  Authorized by: Mae Armas MD     Patient Location:  L&D  Indication: Labor Pain     At Surgeon's request  Pre anesthesia checklist Patient Identified (2 criteria), Consent Verified, Necessary Block Equipment Present, Monitors applied, IV Bolus, Allergies confirmed, Block Plan Confirmed, Drugs/Solutions Labeled, IV Access functioning, Timeout Performed, Coagulation Status, Resuscitation equipment available and Aseptic technique  Epidural:     Patient Position:  Sitting    Prep:  Providone Iodine    Max Sterile Barrier Technique:  Hand washing, cap/mask, sterile gloves, sterile drape and sterile dressing applied    Monitoring:  Continuous pulse oximetry, heart rate, non-invasive blood pressure and FHT's    Approach:  Midline    Location:  L2-3  Injection Technique:  JON saline  Ultrasound Used:  No  Needle and Epidural Catheter:     Needle Type:  Tuohy    Needle Gauge:  18 G    Needle Length:  3.5 in    JON Depth:  6 cm  Catheter Type:  Side hole  Catheter Size:  20 G  Catheter at Skin Depth:  11 cm  Securement:  Stabilization device, Tape and Transparent dressing  Test Dose:  Lidocaine 1.5% with epinephrine 1-to-200,000  Test Dose Volume (mL):  3  Test Dose Result:  Negative  Initial Local Loading Anesthetic:  Bupivacaine  Initial Local Loading Concentration (%):  0.25  Initial Local Loading Volume (mL):  5  Assessment:    Block Outcome: pain improved  Number of Attempts: 1   Procedure Assessment: patient tolerated procedure well with no complications   Sensory Level:  T8  Staff:     Performed By:  Anesthesiologist    Anesthesiologist:  Mae Armas MD  Start Time:  3/5/2022 2:35 PM         normal...

## 2022-03-07 DIAGNOSIS — L03.115 CELLULITIS OF RIGHT LOWER LIMB: ICD-10-CM

## 2022-03-07 DIAGNOSIS — Z79.02 LONG TERM (CURRENT) USE OF ANTITHROMBOTICS/ANTIPLATELETS: ICD-10-CM

## 2022-03-07 DIAGNOSIS — I10 ESSENTIAL (PRIMARY) HYPERTENSION: ICD-10-CM

## 2022-03-07 DIAGNOSIS — Z79.82 LONG TERM (CURRENT) USE OF ASPIRIN: ICD-10-CM

## 2022-03-07 DIAGNOSIS — E43 UNSPECIFIED SEVERE PROTEIN-CALORIE MALNUTRITION: ICD-10-CM

## 2022-03-07 DIAGNOSIS — Z91.041 RADIOGRAPHIC DYE ALLERGY STATUS: ICD-10-CM

## 2022-03-07 DIAGNOSIS — F70 MILD INTELLECTUAL DISABILITIES: ICD-10-CM

## 2022-03-07 DIAGNOSIS — F20.9 SCHIZOPHRENIA, UNSPECIFIED: ICD-10-CM

## 2022-03-07 DIAGNOSIS — J44.9 CHRONIC OBSTRUCTIVE PULMONARY DISEASE, UNSPECIFIED: ICD-10-CM

## 2022-06-16 ENCOUNTER — EMERGENCY (EMERGENCY)
Facility: HOSPITAL | Age: 77
LOS: 1 days | Discharge: HOME | End: 2022-06-16
Attending: EMERGENCY MEDICINE | Admitting: EMERGENCY MEDICINE
Payer: MEDICARE

## 2022-06-16 VITALS
HEART RATE: 82 BPM | HEIGHT: 70 IN | RESPIRATION RATE: 18 BRPM | OXYGEN SATURATION: 98 % | SYSTOLIC BLOOD PRESSURE: 143 MMHG | DIASTOLIC BLOOD PRESSURE: 79 MMHG | WEIGHT: 169.98 LBS

## 2022-06-16 VITALS
SYSTOLIC BLOOD PRESSURE: 135 MMHG | DIASTOLIC BLOOD PRESSURE: 59 MMHG | TEMPERATURE: 97 F | RESPIRATION RATE: 20 BRPM | HEART RATE: 70 BPM | OXYGEN SATURATION: 98 %

## 2022-06-16 PROBLEM — Z87.09 PERSONAL HISTORY OF OTHER DISEASES OF THE RESPIRATORY SYSTEM: Chronic | Status: ACTIVE | Noted: 2022-02-26

## 2022-06-16 PROBLEM — I10 ESSENTIAL (PRIMARY) HYPERTENSION: Chronic | Status: ACTIVE | Noted: 2022-02-26

## 2022-06-16 PROBLEM — F79 UNSPECIFIED INTELLECTUAL DISABILITIES: Chronic | Status: ACTIVE | Noted: 2022-02-26

## 2022-06-16 PROCEDURE — 70450 CT HEAD/BRAIN W/O DYE: CPT | Mod: 26,MA

## 2022-06-16 PROCEDURE — 99284 EMERGENCY DEPT VISIT MOD MDM: CPT | Mod: 25

## 2022-06-16 PROCEDURE — 99284 EMERGENCY DEPT VISIT MOD MDM: CPT | Mod: GC

## 2022-06-16 PROCEDURE — 70450 CT HEAD/BRAIN W/O DYE: CPT | Mod: MA

## 2022-06-16 NOTE — ED PROVIDER NOTE - CLINICAL SUMMARY MEDICAL DECISION MAKING FREE TEXT BOX
76M on ac / schizophrenia presenting from group home s/p fall. Given hx and physical and ac usage no concern for syncopal episode, will get ct head to r/o bleed. Given lack of complaints no other blood work warranted 76M on ac / schizophrenia presenting from group home s/p fall. Given hx and physical and ac usage no concern for syncopal episode, will get ct head to r/o bleed. Given lack of complaints no other blood work warranted    Attn - pt with schizophrenia and fall this am and endorses no complaints and no p/e findings for trauma except abrasion chin. CT head.  Tdap is UTD.

## 2022-06-16 NOTE — ED ADULT TRIAGE NOTE - CHIEF COMPLAINT QUOTE
Unwitnessed fall to the ground [place] to chin Hx Schizophrenia  GCS 15 Calm and cooperative  Tetanus 2017

## 2022-06-16 NOTE — ED PROVIDER NOTE - OBJECTIVE STATEMENT
76M CC fall PMH schizophrenia HTN takes clopidogrel for unknown reason per aid / pt.   Per aide pt rolled over in bed around 630AM, pt does not report falling but denotes no current pain / complaints.  Aide present at bed side notes pt is acting himself and is no deviation from baseline 76M CC fall PMH schizophrenia HTN takes clopidogrel for unknown reason per aid / pt.   Per aide pt rolled over in bed around 630AM, pt does not report falling but denotes no current pain / complaints.  Aide present at bed side notes pt is acting himself and is no deviation from baseline       Attn - pt seen in [id number] - agree with above - pt has no complaints. Tdap is utd.  abrasion chin.  no h/a, dizziness, neck pain, chest/rib pain, abdo pain or extrem pain.

## 2022-06-16 NOTE — ED ADULT NURSE NOTE - NSIMPLEMENTINTERV_GEN_ALL_ED
Implemented All Fall with Harm Risk Interventions:  [place] to call system. Call bell, personal items and telephone within reach. Instruct patient to call for assistance. Room bathroom lighting operational. Non-slip footwear when patient is off stretcher. Physically safe environment: no spills, clutter or unnecessary equipment. Stretcher in lowest position, wheels locked, appropriate side rails in place. Provide visual cue, wrist band, yellow gown, etc. Monitor gait and stability. Monitor for mental status changes and reorient to person, place, and time. Review medications for side effects contributing to fall risk. Reinforce activity limits and safety measures with patient and family. Provide visual clues: red socks.

## 2022-06-16 NOTE — ED PROVIDER NOTE - NSFOLLOWUPINSTRUCTIONS_ED_ALL_ED_FT
Today you were seen in the ED for falling    It was found that you have ********    Please follow up with your primary care provider in regards to today's event.     Please return to the ED if you have any of the following: Today you were seen in the ED for falling    It was found that you have ********    Please follow up with your primary care provider in regards to today's event.   Call 911 or have someone else call if:   You have fallen and are unconscious.  You have fallen and cannot move part of your body.  Contact your healthcare provider if:   You have fallen and have pain or a headache.  You have questions or concerns about your condition or care.    WHAT YOU NEED TO KNOW:    Fall prevention includes ways to make your home and other areas safer. It also includes ways you can move more carefully to prevent a fall. Health conditions that cause changes in your blood pressure, vision, or muscle strength and coordination may increase your risk for falls. Medicines may also increase your risk for falls if they make you dizzy, weak, or sleepy.     DISCHARGE INSTRUCTIONS: Today you were seen in the ED for falling.    It was found that you have no signs of a brain bleed on your CT scan.   A copy of the report is attached below.     You can take Tylenol every 8 hours for pain as needed.     Please follow up with your primary care provider in regards to today's event.   Call 911 or have someone else call if:   You have fallen and are unconscious.  You have fallen and cannot move part of your body.  Contact your healthcare provider if:   You have fallen and have pain or a headache.  You have questions or concerns about your condition or care.    WHAT YOU NEED TO KNOW:    Fall prevention includes ways to make your home and other areas safer. It also includes ways you can move more carefully to prevent a fall. Health conditions that cause changes in your blood pressure, vision, or muscle strength and coordination may increase your risk for falls. Medicines may also increase your risk for falls if they make you dizzy, weak, or sleepy.     DISCHARGE INSTRUCTIONS:

## 2022-06-16 NOTE — ED ADULT NURSE REASSESSMENT NOTE - NS ED NURSE REASSESS COMMENT FT1
pt with ct scan negative pt for discharge pt given nutritional tray aide at bedside pt pending non emergent transport

## 2022-06-16 NOTE — ED PROVIDER NOTE - PATIENT PORTAL LINK FT
You can access the FollowMyHealth Patient Portal offered by [facility] by registering at the following website: [email or web address]. By joining [employer]’s FollowMyHealth portal, you will also be able to view your health information using other applications (apps) compatible with our system.
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
## 2022-06-16 NOTE — ED PROVIDER NOTE - PHYSICAL EXAMINATION
GENERAL: Awake, alert, NAD  HEENT: No C spine tenderness full ROM of neck w/o limitations, PEERLA, no facial tenderness along any point, left sided jaw superficial abrasion no active bleeding, no jaw deviation upon opening / closing   LUNGS: CTAB, no wheezes or crackles   CARDIAC: RRR, no m/r/g  ABDOMEN: Soft, non tender, non distended, no rebound, no guarding  BACK: No midline spinal tenderness, no CVA tenderness  EXT: No edema, no calf tenderness, 2+ DP pulses bilaterally, no deformities.  NEURO: A&Ox3. Moving all extremities. able to raise leg / arms against gravity   SKIN: Warm and dry. No rash.  PSYCH: Normal affect. GENERAL: Awake, alert, NAD  HEENT: No C spine tenderness full ROM of neck w/o limitations, PEERLA, no facial tenderness along any point, left sided jaw superficial abrasion no active bleeding, no jaw deviation upon opening / closing   LUNGS: CTAB, no wheezes or crackles   CARDIAC: RRR, no m/r/g  ABDOMEN: Soft, non tender, non distended, no rebound, no guarding  BACK: No midline spinal tenderness, no CVA tenderness  EXT: No edema, no calf tenderness, 2+ DP pulses bilaterally, no deformities.  NEURO: A&Ox3. Moving all extremities. able to raise leg / arms against gravity   SKIN: Warm and dry. No rash.  PSYCH: Normal affect.     Attn - alert, nad, head - NC/AT, no facial tenderness, mandible and TMJ are NT, tongue - no trauma, PERRL 3 mm, nose - NT, no deformity or signs of epistaxis, neck/spine/back - NT, no pain with AROM neck, Chest/ribs - NT, Lungs - clear, good BS bilaterally without splinting, Cor - RR, no M, Abdo - soft, NT, ND, no HSM or tenderness, no ecchymosis or signs of trauma, no CVAT, Pelvis/hips - NT, and no pain with AROM.  UExt - FROM without pain, NT, LExt - FROM without pain, NT,  Neuro - CN, motor, sensory, cerebellar, speech intact and non focal, skin - abrasion chin.

## 2022-11-28 NOTE — ED PROVIDER NOTE - IV ALTEPLASE EXCL REL HIDDEN
Airway  Performed by: Dov Morin  Authorized by: Colin Dominguez MD     Final Airway Type:  Endotracheal airway  Final Endotracheal Airway*:  ETT  ETT Size (mm)*:  7.0  Cuff*:  Regular  Technique Used for Successful ETT Placement:  Direct laryngoscopy  Devices/Methods Used in Placement*:  Mask  Intubation Procedure*:  Preoxygenation, ETCO2, Atraumatic, Dentition Unchanged and Pharynx Clear  Insertion Site:  Oral  Blade Type*:  MAC  Blade Size*:  3  Secured at (cm)*:  21  Placement Verified by: auscultation and capnometry    Glottic View*:  1 - full view of glottis  Attempts*:  1   Patient Identified, Procedure confirmed, Emergency equipment available and Safety protocols followed  Location:  OR  Urgency:  Elective  Difficult Airway: No    Indications for Airway Management:  Anesthesia  Mask Difficulty Assessment:  1 - vent by mask  Performed By:  CHAS  Start Time: 11/28/2022 11:56 AM    
show

## 2023-01-11 NOTE — ED PROVIDER NOTE - PRINCIPAL DIAGNOSIS
Body Location Override (Optional - Billing Will Still Be Based On Selected Body Map Location If Applicable): left ear Knee pain

## 2023-07-15 ENCOUNTER — EMERGENCY (EMERGENCY)
Facility: HOSPITAL | Age: 78
LOS: 1 days | Discharge: HOME | End: 2023-07-15
Attending: EMERGENCY MEDICINE | Admitting: EMERGENCY MEDICINE
Payer: MEDICARE

## 2023-07-15 VITALS
RESPIRATION RATE: 16 BRPM | HEART RATE: 88 BPM | SYSTOLIC BLOOD PRESSURE: 130 MMHG | OXYGEN SATURATION: 98 % | TEMPERATURE: 98 F | DIASTOLIC BLOOD PRESSURE: 90 MMHG

## 2023-07-15 PROCEDURE — 99284 EMERGENCY DEPT VISIT MOD MDM: CPT | Mod: GC

## 2023-07-15 RX ORDER — TETANUS TOXOID, REDUCED DIPHTHERIA TOXOID AND ACELLULAR PERTUSSIS VACCINE, ADSORBED 5; 2.5; 8; 8; 2.5 [IU]/.5ML; [IU]/.5ML; UG/.5ML; UG/.5ML; UG/.5ML
0.5 SUSPENSION INTRAMUSCULAR ONCE
Refills: 0 | Status: COMPLETED | OUTPATIENT
Start: 2023-07-15 | End: 2023-07-15

## 2023-07-15 RX ORDER — ALBUTEROL 90 UG/1
3 AEROSOL, METERED ORAL ONCE
Refills: 0 | Status: COMPLETED | OUTPATIENT
Start: 2023-07-15 | End: 2023-07-15

## 2023-07-15 RX ADMIN — TETANUS TOXOID, REDUCED DIPHTHERIA TOXOID AND ACELLULAR PERTUSSIS VACCINE, ADSORBED 0.5 MILLILITER(S): 5; 2.5; 8; 8; 2.5 SUSPENSION INTRAMUSCULAR at 23:06

## 2023-07-15 RX ADMIN — ALBUTEROL 3 PUFF(S): 90 AEROSOL, METERED ORAL at 23:06

## 2023-07-15 RX ADMIN — Medication 1 TABLET(S): at 23:06
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
## 2023-07-15 NOTE — ED PROVIDER NOTE - PATIENT PORTAL LINK FT
You can access the FollowMyHealth Patient Portal offered by [facility] by registering at the following website: [email or web address]. By joining [employer]’s FollowMyHealth portal, you will also be able to view your health information using other applications (apps) compatible with our system.
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
## 2023-07-15 NOTE — ED PROVIDER NOTE - NSFOLLOWUPINSTRUCTIONS_ED_ALL_ED_FT
Please take your antibiotic as prescribed.  Please return for any worsening symptoms such as those listed below.  Please see your primary within 3 to 5 days.    SEEK IMMEDIATE MEDICAL CARE IF YOU HAVE ANY OF THE FOLLOWING SYMPTOMS: red streaking away from the wound, fluid/blood/pus coming from the wound, fever or chills, trouble moving the injured area, numbness or tingling extending beyond the wound.
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
## 2023-07-15 NOTE — ED ADULT NURSE NOTE - NSFALLUNIVINTERV_ED_ALL_ED
Bed/Stretcher in lowest position, wheels locked, appropriate side rails in place/Call bell, personal items and telephone in reach/Instruct patient to call for assistance before getting out of bed/chair/stretcher/Non-slip footwear applied when patient is off stretcher/[place] to call system/Physically safe environment - no spills, clutter or unnecessary equipment/Purposeful proactive rounding/Room/bathroom lighting operational, light cord in reach
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
## 2023-07-15 NOTE — ED PROVIDER NOTE - PROGRESS NOTE DETAILS
Maisha Taylor DO (PGY3): Patient given tetanus and Augmentin here.  Labs drawn for age IV and hepatitis.  Patient safe for discharge at this time. Pt made aware of plan. Questions regarding their symptoms were addressed. Advised to follow up with pcp. Given strict return precautions. Pt verbalized understanding.
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
## 2023-07-16 VITALS
SYSTOLIC BLOOD PRESSURE: 127 MMHG | RESPIRATION RATE: 18 BRPM | TEMPERATURE: 98 F | OXYGEN SATURATION: 94 % | HEART RATE: 72 BPM | DIASTOLIC BLOOD PRESSURE: 67 MMHG

## 2023-07-16 LAB
HBV CORE AB SER-ACNC: SIGNIFICANT CHANGE UP
HBV SURFACE AB SER-ACNC: 3 MIU/ML — LOW
HBV SURFACE AG SER-ACNC: SIGNIFICANT CHANGE UP
HCV AB S/CO SERPL IA: 0.23 S/CO — SIGNIFICANT CHANGE UP (ref 0–0.99)
HCV AB SERPL-IMP: SIGNIFICANT CHANGE UP
HIV 1+2 AB+HIV1 P24 AG SERPL QL IA: SIGNIFICANT CHANGE UP

## 2023-07-16 NOTE — ED ADULT NURSE REASSESSMENT NOTE - NS ED NURSE REASSESS COMMENT FT1
Report received from RN Davina, Pt A&Ox2, this is his baseline. Respirations equal and unlabored. Group home staff at bedside. Pending lab results. No acute distress noted. Safety maintained, bed in lowest position, side rails raised.

## 2023-11-08 NOTE — ED ADULT NURSE NOTE - CADM POA URETHRAL CATHETER
Parkview Health  Pt denies DME HH ROBERTA Hx.  Meds afford.  Pt agreeable to [facility].  Fartun ANDERSON to provide joseph [facility] MEDICAID  Secondary: N/A    Disposition Recommendations:  SW/CM recommendation for discharge:            No

## 2023-11-09 ENCOUNTER — EMERGENCY (EMERGENCY)
Facility: HOSPITAL | Age: 78
LOS: 0 days | Discharge: HOME | End: 2023-11-09
Attending: EMERGENCY MEDICINE
Payer: MEDICARE

## 2023-11-09 VITALS
TEMPERATURE: 98 F | SYSTOLIC BLOOD PRESSURE: 125 MMHG | OXYGEN SATURATION: 95 % | HEART RATE: 82 BPM | DIASTOLIC BLOOD PRESSURE: 78 MMHG | RESPIRATION RATE: 16 BRPM

## 2023-11-09 VITALS
SYSTOLIC BLOOD PRESSURE: 137 MMHG | RESPIRATION RATE: 16 BRPM | WEIGHT: 137.13 LBS | HEART RATE: 76 BPM | TEMPERATURE: 98 F | OXYGEN SATURATION: 93 % | HEIGHT: 61 IN | DIASTOLIC BLOOD PRESSURE: 87 MMHG

## 2023-11-09 DIAGNOSIS — M79.89 OTHER SPECIFIED SOFT TISSUE DISORDERS: ICD-10-CM

## 2023-11-09 LAB
ALBUMIN SERPL ELPH-MCNC: 3.2 G/DL — LOW (ref 3.3–5)
ALBUMIN SERPL ELPH-MCNC: 3.2 G/DL — LOW (ref 3.3–5)
ALP SERPL-CCNC: 68 U/L — SIGNIFICANT CHANGE UP (ref 40–120)
ALP SERPL-CCNC: 68 U/L — SIGNIFICANT CHANGE UP (ref 40–120)
ALT FLD-CCNC: 24 U/L — SIGNIFICANT CHANGE UP (ref 12–78)
ALT FLD-CCNC: 24 U/L — SIGNIFICANT CHANGE UP (ref 12–78)
ANION GAP SERPL CALC-SCNC: 4 MMOL/L — LOW (ref 5–17)
ANION GAP SERPL CALC-SCNC: 4 MMOL/L — LOW (ref 5–17)
AST SERPL-CCNC: 24 U/L — SIGNIFICANT CHANGE UP (ref 15–37)
AST SERPL-CCNC: 24 U/L — SIGNIFICANT CHANGE UP (ref 15–37)
BASOPHILS # BLD AUTO: 0.03 K/UL — SIGNIFICANT CHANGE UP (ref 0–0.2)
BASOPHILS # BLD AUTO: 0.03 K/UL — SIGNIFICANT CHANGE UP (ref 0–0.2)
BASOPHILS NFR BLD AUTO: 0.5 % — SIGNIFICANT CHANGE UP (ref 0–2)
BASOPHILS NFR BLD AUTO: 0.5 % — SIGNIFICANT CHANGE UP (ref 0–2)
BILIRUB SERPL-MCNC: 0.2 MG/DL — SIGNIFICANT CHANGE UP (ref 0.2–1.2)
BILIRUB SERPL-MCNC: 0.2 MG/DL — SIGNIFICANT CHANGE UP (ref 0.2–1.2)
BUN SERPL-MCNC: 13 MG/DL — SIGNIFICANT CHANGE UP (ref 7–23)
BUN SERPL-MCNC: 13 MG/DL — SIGNIFICANT CHANGE UP (ref 7–23)
CALCIUM SERPL-MCNC: 8.7 MG/DL — SIGNIFICANT CHANGE UP (ref 8.5–10.1)
CALCIUM SERPL-MCNC: 8.7 MG/DL — SIGNIFICANT CHANGE UP (ref 8.5–10.1)
CHLORIDE SERPL-SCNC: 95 MMOL/L — LOW (ref 96–108)
CHLORIDE SERPL-SCNC: 95 MMOL/L — LOW (ref 96–108)
CO2 SERPL-SCNC: 34 MMOL/L — HIGH (ref 22–31)
CO2 SERPL-SCNC: 34 MMOL/L — HIGH (ref 22–31)
CREAT SERPL-MCNC: 0.59 MG/DL — SIGNIFICANT CHANGE UP (ref 0.5–1.3)
CREAT SERPL-MCNC: 0.59 MG/DL — SIGNIFICANT CHANGE UP (ref 0.5–1.3)
EGFR: 101 ML/MIN/1.73M2 — SIGNIFICANT CHANGE UP
EGFR: 101 ML/MIN/1.73M2 — SIGNIFICANT CHANGE UP
EOSINOPHIL # BLD AUTO: 0.09 K/UL — SIGNIFICANT CHANGE UP (ref 0–0.5)
EOSINOPHIL # BLD AUTO: 0.09 K/UL — SIGNIFICANT CHANGE UP (ref 0–0.5)
EOSINOPHIL NFR BLD AUTO: 1.5 % — SIGNIFICANT CHANGE UP (ref 0–6)
EOSINOPHIL NFR BLD AUTO: 1.5 % — SIGNIFICANT CHANGE UP (ref 0–6)
GLUCOSE SERPL-MCNC: 99 MG/DL — SIGNIFICANT CHANGE UP (ref 70–99)
GLUCOSE SERPL-MCNC: 99 MG/DL — SIGNIFICANT CHANGE UP (ref 70–99)
HCT VFR BLD CALC: 33.5 % — LOW (ref 39–50)
HCT VFR BLD CALC: 33.5 % — LOW (ref 39–50)
HGB BLD-MCNC: 12 G/DL — LOW (ref 13–17)
HGB BLD-MCNC: 12 G/DL — LOW (ref 13–17)
IMM GRANULOCYTES NFR BLD AUTO: 0.5 % — SIGNIFICANT CHANGE UP (ref 0–0.9)
IMM GRANULOCYTES NFR BLD AUTO: 0.5 % — SIGNIFICANT CHANGE UP (ref 0–0.9)
LYMPHOCYTES # BLD AUTO: 2.86 K/UL — SIGNIFICANT CHANGE UP (ref 1–3.3)
LYMPHOCYTES # BLD AUTO: 2.86 K/UL — SIGNIFICANT CHANGE UP (ref 1–3.3)
LYMPHOCYTES # BLD AUTO: 47 % — HIGH (ref 13–44)
LYMPHOCYTES # BLD AUTO: 47 % — HIGH (ref 13–44)
MAGNESIUM SERPL-MCNC: 2 MG/DL — SIGNIFICANT CHANGE UP (ref 1.6–2.6)
MAGNESIUM SERPL-MCNC: 2 MG/DL — SIGNIFICANT CHANGE UP (ref 1.6–2.6)
MCHC RBC-ENTMCNC: 32.5 PG — SIGNIFICANT CHANGE UP (ref 27–34)
MCHC RBC-ENTMCNC: 32.5 PG — SIGNIFICANT CHANGE UP (ref 27–34)
MCHC RBC-ENTMCNC: 35.8 G/DL — SIGNIFICANT CHANGE UP (ref 32–36)
MCHC RBC-ENTMCNC: 35.8 G/DL — SIGNIFICANT CHANGE UP (ref 32–36)
MCV RBC AUTO: 90.8 FL — SIGNIFICANT CHANGE UP (ref 80–100)
MCV RBC AUTO: 90.8 FL — SIGNIFICANT CHANGE UP (ref 80–100)
MONOCYTES # BLD AUTO: 0.99 K/UL — HIGH (ref 0–0.9)
MONOCYTES # BLD AUTO: 0.99 K/UL — HIGH (ref 0–0.9)
MONOCYTES NFR BLD AUTO: 16.3 % — HIGH (ref 2–14)
MONOCYTES NFR BLD AUTO: 16.3 % — HIGH (ref 2–14)
NEUTROPHILS # BLD AUTO: 2.08 K/UL — SIGNIFICANT CHANGE UP (ref 1.8–7.4)
NEUTROPHILS # BLD AUTO: 2.08 K/UL — SIGNIFICANT CHANGE UP (ref 1.8–7.4)
NEUTROPHILS NFR BLD AUTO: 34.2 % — LOW (ref 43–77)
NEUTROPHILS NFR BLD AUTO: 34.2 % — LOW (ref 43–77)
NRBC # BLD: 0 /100 WBCS — SIGNIFICANT CHANGE UP (ref 0–0)
NRBC # BLD: 0 /100 WBCS — SIGNIFICANT CHANGE UP (ref 0–0)
PLATELET # BLD AUTO: 216 K/UL — SIGNIFICANT CHANGE UP (ref 150–400)
PLATELET # BLD AUTO: 216 K/UL — SIGNIFICANT CHANGE UP (ref 150–400)
POTASSIUM SERPL-MCNC: 4.9 MMOL/L — SIGNIFICANT CHANGE UP (ref 3.5–5.3)
POTASSIUM SERPL-MCNC: 4.9 MMOL/L — SIGNIFICANT CHANGE UP (ref 3.5–5.3)
POTASSIUM SERPL-SCNC: 4.9 MMOL/L — SIGNIFICANT CHANGE UP (ref 3.5–5.3)
POTASSIUM SERPL-SCNC: 4.9 MMOL/L — SIGNIFICANT CHANGE UP (ref 3.5–5.3)
PROT SERPL-MCNC: 6.8 GM/DL — SIGNIFICANT CHANGE UP (ref 6–8.3)
PROT SERPL-MCNC: 6.8 GM/DL — SIGNIFICANT CHANGE UP (ref 6–8.3)
RBC # BLD: 3.69 M/UL — LOW (ref 4.2–5.8)
RBC # BLD: 3.69 M/UL — LOW (ref 4.2–5.8)
RBC # FLD: 13.4 % — SIGNIFICANT CHANGE UP (ref 10.3–14.5)
RBC # FLD: 13.4 % — SIGNIFICANT CHANGE UP (ref 10.3–14.5)
SODIUM SERPL-SCNC: 133 MMOL/L — LOW (ref 135–145)
SODIUM SERPL-SCNC: 133 MMOL/L — LOW (ref 135–145)
WBC # BLD: 6.08 K/UL — SIGNIFICANT CHANGE UP (ref 3.8–10.5)
WBC # BLD: 6.08 K/UL — SIGNIFICANT CHANGE UP (ref 3.8–10.5)
WBC # FLD AUTO: 6.08 K/UL — SIGNIFICANT CHANGE UP (ref 3.8–10.5)
WBC # FLD AUTO: 6.08 K/UL — SIGNIFICANT CHANGE UP (ref 3.8–10.5)

## 2023-11-09 PROCEDURE — 93970 EXTREMITY STUDY: CPT | Mod: 26

## 2023-11-09 PROCEDURE — 99284 EMERGENCY DEPT VISIT MOD MDM: CPT

## 2023-11-09 RX ORDER — CEPHALEXIN 500 MG
500 CAPSULE ORAL ONCE
Refills: 0 | Status: COMPLETED | OUTPATIENT
Start: 2023-11-09 | End: 2023-11-09

## 2023-11-09 RX ORDER — CEPHALEXIN 500 MG
1 CAPSULE ORAL
Qty: 28 | Refills: 0
Start: 2023-11-09 | End: 2023-11-15

## 2023-11-09 RX ADMIN — Medication 500 MILLIGRAM(S): at 20:21

## 2023-11-09 NOTE — ED PROVIDER NOTE - CLINICAL SUMMARY MEDICAL DECISION MAKING FREE TEXT BOX
foot swelling. concerning for cellulitis but without pain or warmth. rule out dvt, though less likely. could be dependent edema. foot swelling. concerning for cellulitis but without pain or warmth. rule out dvt, though less likely. could be dependent edema. no sign of dvt. empiric cellulitis treatment

## 2023-11-09 NOTE — ED PROVIDER NOTE - PHYSICAL EXAMINATION
Gen: Alert, NAD  Head: NC, AT   Eyes: PERRL, EOMI, normal lids/conjunctiva  ENT: normal hearing, patent oropharynx without erythema/exudate, uvula midline  Neck: supple, no tenderness, Trachea midline  Pulm: Bilateral BS, normal resp effort, no wheeze/stridor/retractions  CV: RRR, no M/R/G, 2+ radial and dp pulses bl, no edema  Abd: soft, NT/ND, +BS, no hepatosplenomegaly  Mskel: extremities x4 with normal ROM and no joint effusions. no ctl spine ttp.   Skin: pitting edema bl le with erythema. not warm or tender however  Neuro: AAOx2, no sensory/motor deficits, CN 2-12 intact

## 2023-11-09 NOTE — ED PROVIDER NOTE - PATIENT PORTAL LINK FT
You can access the FollowMyHealth Patient Portal offered by [facility] by registering at the following website: [email or web address]. By joining [employer]’s FollowMyHealth portal, you will also be able to view your health information using other applications (apps) compatible with our system.

## 2023-11-09 NOTE — ED PROVIDER NOTE - NSFOLLOWUPINSTRUCTIONS_ED_ALL_ED_FT
You have CELLULITIS - this is an infection of the SKIN.    Take the ANTIBIOTICS - CEPHALEXIN - completely as prescribed.     IBUPROFEN can be helpful for pain.     If the REDNESS extends outside the pen marking, or if you develop FEVER, return immediately to the ER. You have CELLULITIS - this is an infection of the SKIN.    Take the ANTIBIOTICS - CEPHALEXIN - completely as prescribed.     IBUPROFEN can be helpful for pain.     If the REDNESS extends outside the pen marking, or if you develop FEVER, return immediately to the ER.    Call regular doctor for a follow up appointment.
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
## 2023-11-09 NOTE — ED PROVIDER NOTE - OBJECTIVE STATEMENT
77M hx intellectual disability htn, cad, polydipsia pw swollen and erythematous legs bl. staff at home noticed this am. no fever, chills, cp, sob. pt denies pain to the feet.
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
## 2024-01-23 ENCOUNTER — EMERGENCY (EMERGENCY)
Facility: HOSPITAL | Age: 79
LOS: 0 days | Discharge: HOME | End: 2024-01-23
Attending: STUDENT IN AN ORGANIZED HEALTH CARE EDUCATION/TRAINING PROGRAM
Payer: MEDICARE

## 2024-01-23 VITALS
DIASTOLIC BLOOD PRESSURE: 64 MMHG | RESPIRATION RATE: 19 BRPM | TEMPERATURE: 98 F | SYSTOLIC BLOOD PRESSURE: 105 MMHG | HEART RATE: 69 BPM | OXYGEN SATURATION: 100 %

## 2024-01-23 VITALS
WEIGHT: 130.07 LBS | HEIGHT: 64 IN | HEART RATE: 75 BPM | OXYGEN SATURATION: 95 % | SYSTOLIC BLOOD PRESSURE: 101 MMHG | RESPIRATION RATE: 18 BRPM | DIASTOLIC BLOOD PRESSURE: 65 MMHG

## 2024-01-23 DIAGNOSIS — Z20.822 CONTACT WITH AND (SUSPECTED) EXPOSURE TO COVID-19: ICD-10-CM
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
## 2024-01-23 DIAGNOSIS — T68.XXXA HYPOTHERMIA, INITIAL ENCOUNTER: ICD-10-CM

## 2024-01-23 DIAGNOSIS — R41.82 ALTERED MENTAL STATUS, UNSPECIFIED: ICD-10-CM

## 2024-01-23 DIAGNOSIS — F20.9 SCHIZOPHRENIA, UNSPECIFIED: ICD-10-CM
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
## 2024-01-23 DIAGNOSIS — R53.83 OTHER FATIGUE: ICD-10-CM

## 2024-01-23 DIAGNOSIS — Z79.02 LONG TERM (CURRENT) USE OF ANTITHROMBOTICS/ANTIPLATELETS: ICD-10-CM

## 2024-01-23 DIAGNOSIS — Z88.8 ALLERGY STATUS TO OTHER DRUGS, MEDICAMENTS AND BIOLOGICAL SUBSTANCES: ICD-10-CM

## 2024-01-23 DIAGNOSIS — Z79.82 LONG TERM (CURRENT) USE OF ASPIRIN: ICD-10-CM

## 2024-01-23 DIAGNOSIS — I10 ESSENTIAL (PRIMARY) HYPERTENSION: ICD-10-CM

## 2024-01-23 DIAGNOSIS — J44.9 CHRONIC OBSTRUCTIVE PULMONARY DISEASE, UNSPECIFIED: ICD-10-CM

## 2024-01-23 LAB
ALBUMIN SERPL ELPH-MCNC: 2.8 G/DL — LOW (ref 3.3–5)
ALP SERPL-CCNC: 64 U/L — SIGNIFICANT CHANGE UP (ref 40–120)
ALT FLD-CCNC: 26 U/L — SIGNIFICANT CHANGE UP (ref 12–78)
ANION GAP SERPL CALC-SCNC: 5 MMOL/L — SIGNIFICANT CHANGE UP (ref 5–17)
APPEARANCE UR: CLEAR — SIGNIFICANT CHANGE UP
APTT BLD: 39 SEC — HIGH (ref 24.5–35.6)
AST SERPL-CCNC: 22 U/L — SIGNIFICANT CHANGE UP (ref 15–37)
BASOPHILS # BLD AUTO: 0.02 K/UL — SIGNIFICANT CHANGE UP (ref 0–0.2)
BASOPHILS NFR BLD AUTO: 0.4 % — SIGNIFICANT CHANGE UP (ref 0–2)
BILIRUB SERPL-MCNC: 0.2 MG/DL — SIGNIFICANT CHANGE UP (ref 0.2–1.2)
BILIRUB UR-MCNC: NEGATIVE — SIGNIFICANT CHANGE UP
BUN SERPL-MCNC: 12 MG/DL — SIGNIFICANT CHANGE UP (ref 7–23)
CALCIUM SERPL-MCNC: 8.7 MG/DL — SIGNIFICANT CHANGE UP (ref 8.5–10.1)
CHLORIDE SERPL-SCNC: 98 MMOL/L — SIGNIFICANT CHANGE UP (ref 96–108)
CO2 SERPL-SCNC: 32 MMOL/L — HIGH (ref 22–31)
COLOR SPEC: YELLOW — SIGNIFICANT CHANGE UP
CREAT SERPL-MCNC: 0.55 MG/DL — SIGNIFICANT CHANGE UP (ref 0.5–1.3)
DIFF PNL FLD: NEGATIVE — SIGNIFICANT CHANGE UP
EGFR: 102 ML/MIN/1.73M2 — SIGNIFICANT CHANGE UP
EOSINOPHIL # BLD AUTO: 0.1 K/UL — SIGNIFICANT CHANGE UP (ref 0–0.5)
EOSINOPHIL NFR BLD AUTO: 1.9 % — SIGNIFICANT CHANGE UP (ref 0–6)
GLUCOSE SERPL-MCNC: 156 MG/DL — HIGH (ref 70–99)
GLUCOSE UR QL: 100 MG/DL
HCT VFR BLD CALC: 33.7 % — LOW (ref 39–50)
HGB BLD-MCNC: 11.8 G/DL — LOW (ref 13–17)
IMM GRANULOCYTES NFR BLD AUTO: 0.7 % — SIGNIFICANT CHANGE UP (ref 0–0.9)
INR BLD: 1.12 RATIO — SIGNIFICANT CHANGE UP (ref 0.85–1.18)
KETONES UR-MCNC: NEGATIVE MG/DL — SIGNIFICANT CHANGE UP
LACTATE SERPL-SCNC: 1.5 MMOL/L — SIGNIFICANT CHANGE UP (ref 0.7–2)
LEUKOCYTE ESTERASE UR-ACNC: NEGATIVE — SIGNIFICANT CHANGE UP
LYMPHOCYTES # BLD AUTO: 1.89 K/UL — SIGNIFICANT CHANGE UP (ref 1–3.3)
LYMPHOCYTES # BLD AUTO: 35.3 % — SIGNIFICANT CHANGE UP (ref 13–44)
MCHC RBC-ENTMCNC: 32.8 PG — SIGNIFICANT CHANGE UP (ref 27–34)
MCHC RBC-ENTMCNC: 35 G/DL — SIGNIFICANT CHANGE UP (ref 32–36)
MCV RBC AUTO: 93.6 FL — SIGNIFICANT CHANGE UP (ref 80–100)
MONOCYTES # BLD AUTO: 0.94 K/UL — HIGH (ref 0–0.9)
MONOCYTES NFR BLD AUTO: 17.6 % — HIGH (ref 2–14)
NEUTROPHILS # BLD AUTO: 2.36 K/UL — SIGNIFICANT CHANGE UP (ref 1.8–7.4)
NEUTROPHILS NFR BLD AUTO: 44.1 % — SIGNIFICANT CHANGE UP (ref 43–77)
NITRITE UR-MCNC: NEGATIVE — SIGNIFICANT CHANGE UP
NRBC # BLD: 0 /100 WBCS — SIGNIFICANT CHANGE UP (ref 0–0)
PH UR: 7.5 — SIGNIFICANT CHANGE UP (ref 5–8)
PLATELET # BLD AUTO: 233 K/UL — SIGNIFICANT CHANGE UP (ref 150–400)
POTASSIUM SERPL-MCNC: 3.8 MMOL/L — SIGNIFICANT CHANGE UP (ref 3.5–5.3)
POTASSIUM SERPL-SCNC: 3.8 MMOL/L — SIGNIFICANT CHANGE UP (ref 3.5–5.3)
PROT SERPL-MCNC: 6.4 GM/DL — SIGNIFICANT CHANGE UP (ref 6–8.3)
PROT UR-MCNC: NEGATIVE MG/DL — SIGNIFICANT CHANGE UP
PROTHROM AB SERPL-ACNC: 13.4 SEC — HIGH (ref 9.5–13)
RAPID RVP RESULT: SIGNIFICANT CHANGE UP
RBC # BLD: 3.6 M/UL — LOW (ref 4.2–5.8)
RBC # FLD: 13.9 % — SIGNIFICANT CHANGE UP (ref 10.3–14.5)
SARS-COV-2 RNA SPEC QL NAA+PROBE: SIGNIFICANT CHANGE UP
SODIUM SERPL-SCNC: 135 MMOL/L — SIGNIFICANT CHANGE UP (ref 135–145)
SP GR SPEC: 1.01 — SIGNIFICANT CHANGE UP (ref 1–1.03)
TROPONIN I, HIGH SENSITIVITY RESULT: 6.4 NG/L — SIGNIFICANT CHANGE UP
UROBILINOGEN FLD QL: 1 MG/DL — SIGNIFICANT CHANGE UP (ref 0.2–1)
WBC # BLD: 5.35 K/UL — SIGNIFICANT CHANGE UP (ref 3.8–10.5)
WBC # FLD AUTO: 5.35 K/UL — SIGNIFICANT CHANGE UP (ref 3.8–10.5)

## 2024-01-23 PROCEDURE — 99285 EMERGENCY DEPT VISIT HI MDM: CPT

## 2024-01-23 PROCEDURE — 71045 X-RAY EXAM CHEST 1 VIEW: CPT | Mod: 26

## 2024-01-23 PROCEDURE — 70450 CT HEAD/BRAIN W/O DYE: CPT | Mod: 26,MA

## 2024-01-23 PROCEDURE — 93010 ELECTROCARDIOGRAM REPORT: CPT

## 2024-01-23 RX ORDER — CLOPIDOGREL BISULFATE 75 MG/1
1 TABLET, FILM COATED ORAL
Qty: 0 | Refills: 0 | DISCHARGE

## 2024-01-23 RX ORDER — FLUTICASONE FUROATE AND VILANTEROL TRIFENATATE 100; 25 UG/1; UG/1
1 POWDER RESPIRATORY (INHALATION)
Qty: 0 | Refills: 0 | DISCHARGE

## 2024-01-23 RX ORDER — DOCUSATE SODIUM 100 MG
1 CAPSULE ORAL
Qty: 0 | Refills: 0 | DISCHARGE

## 2024-01-23 RX ORDER — MIRABEGRON 50 MG/1
1 TABLET, EXTENDED RELEASE ORAL
Qty: 0 | Refills: 0 | DISCHARGE

## 2024-01-23 RX ORDER — LACTULOSE 10 G/15ML
0 SOLUTION ORAL
Qty: 0 | Refills: 0 | DISCHARGE

## 2024-01-23 RX ORDER — ALBUTEROL 90 UG/1
200 AEROSOL, METERED ORAL
Qty: 0 | Refills: 0 | DISCHARGE

## 2024-01-23 RX ORDER — DIVALPROEX SODIUM 500 MG/1
0 TABLET, DELAYED RELEASE ORAL
Qty: 0 | Refills: 0 | DISCHARGE

## 2024-01-23 NOTE — ED PROVIDER NOTE - WR ORDER NAME 1
Stage III chronic kidney disease is stable.  I will continue to monitor the patient's BUN, creatinine, and GFR.  The patient was encouraged to avoid all nonsteroidal anti-inflammatory agents (i.e. Advil, Aleve, Motrin, Naprosyn, and Celebrex).   Xray Chest 1 View- PORTABLE-Urgent

## 2024-01-23 NOTE — ED ADULT NURSE NOTE - OBJECTIVE STATEMENT
Per private CNA: patient is overly sleepy and lethargic: reports patient's speech is normally garbled but he is usually alert, awake and ambulatory in the assisted living home. Per aide he was normal yesterday. Normally patient independent.

## 2024-01-23 NOTE — ED PROVIDER NOTE - OBJECTIVE STATEMENT
77 y/o M, from group [place], hx of COPD, schizophrenia, HTN, presents to the ED for acute lethargy. Per group home staff, patient was more drowsy and lethargic over the past day. Patient is normally alert and ambulatory without assistance and can function independently. Today, patient was difficult to rouse and get out of bed. Patient denies any pain. No N/V/D, fever, or chills.

## 2024-01-23 NOTE — ED PROVIDER NOTE - PATIENT PORTAL LINK FT
You can access the FollowMyHealth Patient Portal offered by [facility] by registering at the following website: [email or web address]. By joining [employer]’s FollowMyHealth portal, you will also be able to view your health information using other applications (apps) compatible with our system.

## 2024-01-23 NOTE — ED ADULT NURSE NOTE - ED CARDIAC CAPILLARY REFILL
Addended by: Ronit Whitman on: 11/16/2021 03:23 PM     Modules accepted: Level of Service
2 seconds or less

## 2024-01-23 NOTE — ED PROVIDER NOTE - CLINICAL SUMMARY MEDICAL DECISION MAKING FREE TEXT BOX
77 y/o M, from group home, hx of COPD, schizophrenia, HTN, presents to the ED for acute lethargy.   Mildly hypothermic.  Vitals otherwise stable.  Patient seems lethargic.  Will check labs/CXR/EKG/CTH  Will send blood cx although no overt signs of sepsis  Reassess    Labs reviewed and WNL  CXR is negative  CTH  non focal  Patient improved on exam w/ no deficits

## 2024-01-23 NOTE — ED PROVIDER NOTE - PHYSICAL EXAMINATION
GENERAL: sleeping but arouseable   HEENT: NC/AT, moist mucous membranes  LUNGS: CTAB, no wheezes or crackles   CARDIAC: RRR, no m/r/g  ABDOMEN: Soft, normal BS, non tender, non distended, no rebound, no guarding  EXT: No edema, no calf tenderness, no deformities.  NEURO: A&Ox2, appears to be baseline. Moving all extremities.  SKIN: Warm and dry. No rash.  PSYCH: Normal affect.

## 2024-01-23 NOTE — ED PROVIDER NOTE - NSFOLLOWUPINSTRUCTIONS_ED_ALL_ED_FT
Jason was seen in the ED for evaluation.    His labs and imaging did not show acute findings.    He was cleared for discharge to follow up with his primary care doctor.
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
## 2024-01-23 NOTE — ED ADULT NURSE NOTE - SUICIDE SCREENING QUESTION 3
Telephone Encounter by Lucy Nieves RMA at 05/22/18 04:54 PM     Author:  Lucy Nieves RMA Service:  (none) Author Type:  Certified Medical Assistant     Filed:  05/22/18 04:58 PM Encounter Date:  5/22/2018 Status:  Signed     :  Lucy Nieves RMA (Certified Medical Assistant)            Patient has pending appointment 5/29/18. Refilled until then.[AB1.1M]      Revision History        User Key Date/Time User Provider Type Action    > AB1.1 05/22/18 04:58 PM Lucy Nieves RMA Certified Medical Assistant Sign    M - Manual             Patient unable to complete

## 2024-01-23 NOTE — ED PROVIDER NOTE - PROGRESS NOTE DETAILS
Patient is now at baseline mental status, awake, ambulatory and tolerating PO in the ED. Labs and imaging WNL. Will plan for discharge. Amrit Vigil DO

## 2024-01-23 NOTE — ED ADULT NURSE NOTE - NSFALLRISKINTERV_ED_ALL_ED
Assistance OOB with selected safe patient handling equipment if applicable/Assistance with ambulation/Communicate fall risk and risk factors to all staff, patient, and family/Monitor gait and stability/Monitor for mental status changes and reorient to person, place, and time, as needed/Move patient closer to nursing station/within visual sight of ED staff/Provide patient with walking aids/Provide visual cue: yellow wristband, yellow gown, etc/Reinforce activity limits and safety measures with patient and family/Toileting schedule using arm’s reach rule for commode and bathroom/Use of alarms - bed, stretcher, chair and/or video monitoring/Call bell, personal items and telephone in reach/Instruct patient to call for assistance before getting out of bed/chair/stretcher/Non-slip footwear applied when patient is off stretcher/[place] to call system/Physically safe environment - no spills, clutter or unnecessary equipment/Purposeful Proactive Rounding/Room/bathroom lighting operational, light cord in reach

## 2024-01-27 LAB
-  AMPICILLIN: SIGNIFICANT CHANGE UP
-  CIPROFLOXACIN: SIGNIFICANT CHANGE UP
-  LEVOFLOXACIN: SIGNIFICANT CHANGE UP
-  NITROFURANTOIN: SIGNIFICANT CHANGE UP
-  TETRACYCLINE: SIGNIFICANT CHANGE UP
-  VANCOMYCIN: SIGNIFICANT CHANGE UP
CULTURE RESULTS: ABNORMAL
METHOD TYPE: SIGNIFICANT CHANGE UP
ORGANISM # SPEC MICROSCOPIC CNT: ABNORMAL
ORGANISM # SPEC MICROSCOPIC CNT: SIGNIFICANT CHANGE UP
SPECIMEN SOURCE: SIGNIFICANT CHANGE UP

## 2024-01-29 LAB
CULTURE RESULTS: SIGNIFICANT CHANGE UP
CULTURE RESULTS: SIGNIFICANT CHANGE UP
SPECIMEN SOURCE: SIGNIFICANT CHANGE UP
SPECIMEN SOURCE: SIGNIFICANT CHANGE UP

## 2024-02-12 NOTE — PATIENT PROFILE ADULT - FUNCTIONAL ASSESSMENT - BASIC MOBILITY 6.
Initiate Treatment: Clobetasol x 2 weeks and Eucrisa x 2 weeks. Repeat cycle as needed for flaring.\\n\\nZyrtec daily Render In Strict Bullet Format?: No Discontinue Regimen: Scented laundry detergent, dryer sheets, fabric softener Detail Level: Zone 3 = A little assistance

## 2024-03-06 NOTE — ED PROVIDER NOTE - PHYSICAL EXAMINATION
----- Message from Juana Puente MD sent at 3/5/2024  9:08 AM CST -----  Please advise patient. Negative fetal genetic testing - negative testing for trisomy 21, 18, and 13. Please advise of fetal sex if patient desires to know.    Please advise her to schedule cerclage placement with MFM. Thanks.    Juana Puente MD   Gen: Alert, NAD, well appearing  Head: NC, AT, EOMI, normal lids/conjunctiva  ENT: normal hearing, patent oropharynx without erythema/exudate, uvula midline  Neck: +supple, no tenderness, +Trachea midline  Pulm: Bilateral BS, normal resp effort, no wheeze/stridor/retractions  CV: RRR, no M/R/G, +dist pulses  Abd: soft, NT/ND, Negative [place] signs, +BS, no palpable masses  Mskel: no edema/erythema/cyanosis, small superficial abrasion of right shin  Skin: no rash, warm/dry  Neuro: AAOx3, no apparent sensory/motor deficits, coordination intact

## 2024-10-02 ENCOUNTER — INPATIENT (INPATIENT)
Facility: HOSPITAL | Age: 79
LOS: 1 days | Discharge: HOME | End: 2024-10-04
Attending: HOSPITALIST | Admitting: HOSPITALIST
Payer: MEDICARE

## 2024-10-02 VITALS
HEART RATE: 82 BPM | HEIGHT: 68 IN | OXYGEN SATURATION: 98 % | RESPIRATION RATE: 22 BRPM | SYSTOLIC BLOOD PRESSURE: 103 MMHG | DIASTOLIC BLOOD PRESSURE: 67 MMHG | WEIGHT: 149.91 LBS | TEMPERATURE: 97 F

## 2024-10-02 DIAGNOSIS — F79 UNSPECIFIED INTELLECTUAL DISABILITIES: ICD-10-CM

## 2024-10-02 DIAGNOSIS — F20.9 SCHIZOPHRENIA, UNSPECIFIED: ICD-10-CM

## 2024-10-02 DIAGNOSIS — I10 ESSENTIAL (PRIMARY) HYPERTENSION: ICD-10-CM

## 2024-10-02 DIAGNOSIS — R41.82 ALTERED MENTAL STATUS, UNSPECIFIED: ICD-10-CM

## 2024-10-02 DIAGNOSIS — E78.5 HYPERLIPIDEMIA, UNSPECIFIED: ICD-10-CM

## 2024-10-02 DIAGNOSIS — J44.9 CHRONIC OBSTRUCTIVE PULMONARY DISEASE, UNSPECIFIED: ICD-10-CM

## 2024-10-02 LAB
ALBUMIN SERPL ELPH-MCNC: 2.9 G/DL — LOW (ref 3.3–5)
ALP SERPL-CCNC: 77 U/L — SIGNIFICANT CHANGE UP (ref 40–120)
ALT FLD-CCNC: 37 U/L — SIGNIFICANT CHANGE UP (ref 12–78)
ANION GAP SERPL CALC-SCNC: 3 MMOL/L — LOW (ref 5–17)
APPEARANCE UR: CLEAR — SIGNIFICANT CHANGE UP
AST SERPL-CCNC: 22 U/L — SIGNIFICANT CHANGE UP (ref 15–37)
BACTERIA # UR AUTO: NEGATIVE /HPF — SIGNIFICANT CHANGE UP
BASOPHILS # BLD AUTO: 0.02 K/UL — SIGNIFICANT CHANGE UP (ref 0–0.2)
BASOPHILS NFR BLD AUTO: 0.4 % — SIGNIFICANT CHANGE UP (ref 0–2)
BILIRUB SERPL-MCNC: 0.2 MG/DL — SIGNIFICANT CHANGE UP (ref 0.2–1.2)
BILIRUB UR-MCNC: NEGATIVE — SIGNIFICANT CHANGE UP
BUN SERPL-MCNC: 16 MG/DL — SIGNIFICANT CHANGE UP (ref 7–23)
CALCIUM SERPL-MCNC: 8.7 MG/DL — SIGNIFICANT CHANGE UP (ref 8.5–10.1)
CHLORIDE SERPL-SCNC: 100 MMOL/L — SIGNIFICANT CHANGE UP (ref 96–108)
CO2 SERPL-SCNC: 31 MMOL/L — SIGNIFICANT CHANGE UP (ref 22–31)
COLOR SPEC: YELLOW — SIGNIFICANT CHANGE UP
CREAT SERPL-MCNC: 0.46 MG/DL — LOW (ref 0.5–1.3)
DIFF PNL FLD: NEGATIVE — SIGNIFICANT CHANGE UP
EGFR: 108 ML/MIN/1.73M2 — SIGNIFICANT CHANGE UP
EOSINOPHIL # BLD AUTO: 0.08 K/UL — SIGNIFICANT CHANGE UP (ref 0–0.5)
EOSINOPHIL NFR BLD AUTO: 1.5 % — SIGNIFICANT CHANGE UP (ref 0–6)
EPI CELLS # UR: SIGNIFICANT CHANGE UP
GAS PNL BLDA: SIGNIFICANT CHANGE UP
GLUCOSE SERPL-MCNC: 85 MG/DL — SIGNIFICANT CHANGE UP (ref 70–99)
GLUCOSE UR QL: NEGATIVE MG/DL — SIGNIFICANT CHANGE UP
HCT VFR BLD CALC: 31.8 % — LOW (ref 39–50)
HGB BLD-MCNC: 10.9 G/DL — LOW (ref 13–17)
IMM GRANULOCYTES NFR BLD AUTO: 0.9 % — SIGNIFICANT CHANGE UP (ref 0–0.9)
KETONES UR-MCNC: NEGATIVE MG/DL — SIGNIFICANT CHANGE UP
LEUKOCYTE ESTERASE UR-ACNC: NEGATIVE — SIGNIFICANT CHANGE UP
LYMPHOCYTES # BLD AUTO: 1.48 K/UL — SIGNIFICANT CHANGE UP (ref 1–3.3)
LYMPHOCYTES # BLD AUTO: 27.1 % — SIGNIFICANT CHANGE UP (ref 13–44)
MAGNESIUM SERPL-MCNC: 1.9 MG/DL — SIGNIFICANT CHANGE UP (ref 1.6–2.6)
MCHC RBC-ENTMCNC: 32.2 PG — SIGNIFICANT CHANGE UP (ref 27–34)
MCHC RBC-ENTMCNC: 34.3 G/DL — SIGNIFICANT CHANGE UP (ref 32–36)
MCV RBC AUTO: 94.1 FL — SIGNIFICANT CHANGE UP (ref 80–100)
MONOCYTES # BLD AUTO: 0.93 K/UL — HIGH (ref 0–0.9)
MONOCYTES NFR BLD AUTO: 17 % — HIGH (ref 2–14)
NEUTROPHILS # BLD AUTO: 2.9 K/UL — SIGNIFICANT CHANGE UP (ref 1.8–7.4)
NEUTROPHILS NFR BLD AUTO: 53.1 % — SIGNIFICANT CHANGE UP (ref 43–77)
NITRITE UR-MCNC: NEGATIVE — SIGNIFICANT CHANGE UP
NRBC # BLD: 0 /100 WBCS — SIGNIFICANT CHANGE UP (ref 0–0)
PH UR: 7.5 — SIGNIFICANT CHANGE UP (ref 5–8)
PLATELET # BLD AUTO: 235 K/UL — SIGNIFICANT CHANGE UP (ref 150–400)
POTASSIUM SERPL-MCNC: 4.3 MMOL/L — SIGNIFICANT CHANGE UP (ref 3.5–5.3)
POTASSIUM SERPL-SCNC: 4.3 MMOL/L — SIGNIFICANT CHANGE UP (ref 3.5–5.3)
PROT SERPL-MCNC: 5.9 GM/DL — LOW (ref 6–8.3)
PROT UR-MCNC: NEGATIVE MG/DL — SIGNIFICANT CHANGE UP
RAPID RVP RESULT: SIGNIFICANT CHANGE UP
RBC # BLD: 3.38 M/UL — LOW (ref 4.2–5.8)
RBC # FLD: 13.7 % — SIGNIFICANT CHANGE UP (ref 10.3–14.5)
RBC CASTS # UR COMP ASSIST: 0 /HPF — SIGNIFICANT CHANGE UP (ref 0–4)
SARS-COV-2 RNA SPEC QL NAA+PROBE: SIGNIFICANT CHANGE UP
SODIUM SERPL-SCNC: 134 MMOL/L — LOW (ref 135–145)
SP GR SPEC: 1.01 — SIGNIFICANT CHANGE UP (ref 1–1.03)
UROBILINOGEN FLD QL: 1 MG/DL — SIGNIFICANT CHANGE UP (ref 0.2–1)
WBC # BLD: 5.46 K/UL — SIGNIFICANT CHANGE UP (ref 3.8–10.5)
WBC # FLD AUTO: 5.46 K/UL — SIGNIFICANT CHANGE UP (ref 3.8–10.5)
WBC UR QL: 1 /HPF — SIGNIFICANT CHANGE UP (ref 0–5)

## 2024-10-02 PROCEDURE — 70450 CT HEAD/BRAIN W/O DYE: CPT | Mod: 26,MC

## 2024-10-02 PROCEDURE — 71045 X-RAY EXAM CHEST 1 VIEW: CPT | Mod: 26

## 2024-10-02 PROCEDURE — 99285 EMERGENCY DEPT VISIT HI MDM: CPT

## 2024-10-02 PROCEDURE — 93010 ELECTROCARDIOGRAM REPORT: CPT

## 2024-10-02 PROCEDURE — 99222 1ST HOSP IP/OBS MODERATE 55: CPT

## 2024-10-02 RX ORDER — SODIUM CHLORIDE IRRIG SOLUTION 0.9 %
1000 SOLUTION, IRRIGATION IRRIGATION
Refills: 0 | Status: DISCONTINUED | OUTPATIENT
Start: 2024-10-02 | End: 2024-10-03

## 2024-10-02 RX ORDER — LACTULOSE 10 G/15ML
10 SOLUTION ORAL; RECTAL DAILY
Refills: 0 | Status: DISCONTINUED | OUTPATIENT
Start: 2024-10-02 | End: 2024-10-04

## 2024-10-02 RX ORDER — ASTEMIZOLE 10 MG
500 TABLET ORAL
Refills: 0 | Status: DISCONTINUED | OUTPATIENT
Start: 2024-10-02 | End: 2024-10-04

## 2024-10-02 RX ORDER — ATORVASTATIN CALCIUM 10 MG/1
20 TABLET, FILM COATED ORAL AT BEDTIME
Refills: 0 | Status: DISCONTINUED | OUTPATIENT
Start: 2024-10-02 | End: 2024-10-04

## 2024-10-02 RX ORDER — ASPIRIN 325 MG
81 TABLET ORAL DAILY
Refills: 0 | Status: DISCONTINUED | OUTPATIENT
Start: 2024-10-02 | End: 2024-10-04

## 2024-10-02 RX ORDER — ALBUTEROL 90 MCG
2 AEROSOL (GRAM) INHALATION
Refills: 0 | Status: DISCONTINUED | OUTPATIENT
Start: 2024-10-02 | End: 2024-10-04

## 2024-10-02 RX ORDER — ACETAMINOPHEN 325 MG
650 TABLET ORAL EVERY 6 HOURS
Refills: 0 | Status: DISCONTINUED | OUTPATIENT
Start: 2024-10-02 | End: 2024-10-04

## 2024-10-02 RX ORDER — IPRATROPIUM BROMIDE AND ALBUTEROL SULFATE .5; 3 MG/3ML; MG/3ML
3 SOLUTION RESPIRATORY (INHALATION) EVERY 6 HOURS
Refills: 0 | Status: DISCONTINUED | OUTPATIENT
Start: 2024-10-02 | End: 2024-10-04

## 2024-10-02 RX ORDER — FLUTICASONE PROPION/SALMETEROL 100-50 MCG
1 BLISTER, WITH INHALATION DEVICE INHALATION
Refills: 0 | Status: DISCONTINUED | OUTPATIENT
Start: 2024-10-02 | End: 2024-10-04

## 2024-10-02 RX ADMIN — IPRATROPIUM BROMIDE AND ALBUTEROL SULFATE 3 MILLILITER(S): .5; 3 SOLUTION RESPIRATORY (INHALATION) at 20:14

## 2024-10-02 RX ADMIN — Medication 75 MILLIGRAM(S): at 18:55

## 2024-10-02 RX ADMIN — Medication 1 DOSE(S): at 19:30

## 2024-10-02 RX ADMIN — Medication 81 MILLIGRAM(S): at 18:54

## 2024-10-02 RX ADMIN — ATORVASTATIN CALCIUM 20 MILLIGRAM(S): 10 TABLET, FILM COATED ORAL at 22:43

## 2024-10-02 RX ADMIN — Medication 125 MILLILITER(S): at 18:56

## 2024-10-02 RX ADMIN — Medication 500 MILLIGRAM(S): at 22:43

## 2024-10-02 NOTE — ED ADULT TRIAGE NOTE - CHIEF COMPLAINT QUOTE
BIBA- from group home  Aid states was fine waking up this morning. Around 9am found to be lethargic, moving all extremities  EMS , 77/44, left FA#20g 400cc NS given  HX afib, COPD,

## 2024-10-02 NOTE — H&P ADULT - ASSESSMENT
78 years old male with h/o HTN, HLD, COPD, schizophrenia, mild intellectual disability was brought in from group home for AMS. At baseline, patient is ambulatory, able to engage in some conversation. Patient was noted to be altered, unresponsive at around 9:15AM while sitting on chair. No loss of bladder or bowel control. No seizure like activity. Patient was fine today AM before the incidence. Was hypotensive to 77/44. Took BP meds in AM. Denied any urinary symptoms, fever or chills. Patient has some cough.   Hemodynamically stable, afebrile, sat well at RA. No leukocytosis, plt 235, Cr 0.46. UA negative for infection. CT head with no acute pathology

## 2024-10-02 NOTE — ED ADULT NURSE NOTE - NSFALLRISKINTERV_ED_ALL_ED

## 2024-10-02 NOTE — H&P ADULT - PROBLEM SELECTOR PLAN 1
brought in with AMS  Was hypotensive to 77/44. Took BP meds in AM. Improved without intervention  Afebrile, no leukocytosis, UA negative for UTI  CXR  ( I personally review) with no focal consolidation  CT head  ( I personally review) with no focal consolidation  suspect AMS from episode of hypotension  Check RVP  Check EEG  if no improvement in AM, will consider MRI brain  on aspirin and plavix at home

## 2024-10-02 NOTE — H&P ADULT - PROBLEM SELECTOR PLAN 4
once mental status improves, resume escitalopram 20mg daily and olanzapine  Continue Depakote ER 500mg bid

## 2024-10-02 NOTE — ED PROVIDER NOTE - PHYSICAL EXAMINATION
General appearance: Nontoxic appearing, moaning nonsensically, afebrile    Eyes: anicteric sclerae, LEVI, EOMI   HENT: Atraumatic; oropharynx clear, MMM and no ulcerations, no pharyngeal erythema or exudate   Neck: Trachea midline; Full range of motion, supple   Pulm: CTA bl, normal respiratory effort and no intercostal retractions, normal work of breathing   CV: RRR, No murmurs, rubs, or gallops   Abdomen: Soft, non-tender, non-distended; no guarding or rebound   Extremities: No peripheral edema, no gross deformities, FROM x4   Skin: Dry, normal temperature, turgor and texture; no rash

## 2024-10-02 NOTE — ED PROVIDER NOTE - CLINICAL SUMMARY MEDICAL DECISION MAKING FREE TEXT BOX
79yo male with pmh htn, copd, intellectual disability, schizophrenia, presenting with ams.  Patient not at baseline per group home staff at bedside.  Reports he was at baseline last night and earlier this morning around 9.  He is more lethargic and slurring words.  Moving all extremities.  No fever, recent illnesses, n/v/d, abd pain, cp, sob.  Patient unable to contribute to history 2/2 mental status, a&ox1-2 at baseline.  Seems less likely cva, more likely infectious metabolic cause.  Will get labs, urine, ct, reassess.  If not returning to baseline, likely admit.

## 2024-10-02 NOTE — PATIENT PROFILE ADULT - FALL HARM RISK - HARM RISK INTERVENTIONS

## 2024-10-02 NOTE — H&P ADULT - HISTORY OF PRESENT ILLNESS
Talked to group home staff at bedside  78 years old male with h/o HTN, HLD, COPD, schizophrenia, mild intellectual disability was brought in from group home for AMS. At baseline, patient is ambulatory, able to engage in some conversation. Patient was noted to be altered, unresponsive at around 9:15AM while sitting on chair. No loss of bladder or bowel control. No seizure like activity. Patient was fine today AM before the incidence. Was hypotensive to 77/44. Took BP meds in AM. Denied any urinary symptoms, fever or chills. Patient has some cough.   Hemodynamically stable, afebrile, sat well at RA. No leukocytosis, plt 235, Cr 0.46. UA negative for infection. CT head with no acute pathology

## 2024-10-02 NOTE — ED ADULT NURSE NOTE - ED STAT RN HANDOFF DETAILS
report given to hold RN, pt being accompanied by aide, no signs or symptoms of acute distress noted at this time

## 2024-10-02 NOTE — H&P ADULT - NSHPPHYSICALEXAM_GEN_ALL_CORE
CONSTITUTIONAL: lethargic, no acute distress.  EYES: PERRL, no scleral icterus  ENT: Mucosa moist, tongue normal.  NECK: Neck supple, trachea midline, non-tender  CARDIAC: Normal S1 and S2. Regular rate and rhythms. No Pedal edema  LUNGS: Equal air entry both lungs. No rales, rhonchi, wheezing. Normal respiratory effort.   ABDOMEN: Soft, nondistended, nontender. No guarding or rebound tenderness. No hepatomegaly or splenomegaly. Bowel sound normal.   MUSCULOSKELETAL: Normocephalic, atraumatic. No significant deformity or joint abnormality.  NEUROLOGICAL: Move all extremities  SKIN: no lesions or eruptions. Normal turgor  PSYCHIATRIC: A&O x 2, lethargic

## 2024-10-02 NOTE — ED ADULT NURSE NOTE - OBJECTIVE STATEMENT
78 year old male a/ox1 biba and accompanied by aide from group home c/o altered mental status, as per aide pt baseline a/ox1-a/ox2, pt ambulates with assistive device and one assist at baseline, pt appears weak and lethargic, upon arrival noted 20G to left forearm

## 2024-10-03 LAB
ALBUMIN SERPL ELPH-MCNC: 2.9 G/DL — LOW (ref 3.3–5)
ALP SERPL-CCNC: 88 U/L — SIGNIFICANT CHANGE UP (ref 40–120)
ALT FLD-CCNC: 34 U/L — SIGNIFICANT CHANGE UP (ref 12–78)
AMMONIA BLD-MCNC: 59 UMOL/L — HIGH (ref 11–32)
ANION GAP SERPL CALC-SCNC: 5 MMOL/L — SIGNIFICANT CHANGE UP (ref 5–17)
AST SERPL-CCNC: 23 U/L — SIGNIFICANT CHANGE UP (ref 15–37)
BILIRUB SERPL-MCNC: 0.4 MG/DL — SIGNIFICANT CHANGE UP (ref 0.2–1.2)
BUN SERPL-MCNC: 10 MG/DL — SIGNIFICANT CHANGE UP (ref 7–23)
CALCIUM SERPL-MCNC: 9 MG/DL — SIGNIFICANT CHANGE UP (ref 8.5–10.1)
CHLORIDE SERPL-SCNC: 100 MMOL/L — SIGNIFICANT CHANGE UP (ref 96–108)
CO2 SERPL-SCNC: 29 MMOL/L — SIGNIFICANT CHANGE UP (ref 22–31)
CREAT SERPL-MCNC: 0.47 MG/DL — LOW (ref 0.5–1.3)
EGFR: 107 ML/MIN/1.73M2 — SIGNIFICANT CHANGE UP
GLUCOSE SERPL-MCNC: 84 MG/DL — SIGNIFICANT CHANGE UP (ref 70–99)
HCT VFR BLD CALC: 36.7 % — LOW (ref 39–50)
HGB BLD-MCNC: 12.5 G/DL — LOW (ref 13–17)
MAGNESIUM SERPL-MCNC: 1.9 MG/DL — SIGNIFICANT CHANGE UP (ref 1.6–2.6)
MCHC RBC-ENTMCNC: 32.2 PG — SIGNIFICANT CHANGE UP (ref 27–34)
MCHC RBC-ENTMCNC: 34.1 G/DL — SIGNIFICANT CHANGE UP (ref 32–36)
MCV RBC AUTO: 94.6 FL — SIGNIFICANT CHANGE UP (ref 80–100)
NRBC # BLD: 0 /100 WBCS — SIGNIFICANT CHANGE UP (ref 0–0)
PHOSPHATE SERPL-MCNC: 3.8 MG/DL — SIGNIFICANT CHANGE UP (ref 2.5–4.5)
PLATELET # BLD AUTO: 232 K/UL — SIGNIFICANT CHANGE UP (ref 150–400)
POTASSIUM SERPL-MCNC: 4.1 MMOL/L — SIGNIFICANT CHANGE UP (ref 3.5–5.3)
POTASSIUM SERPL-SCNC: 4.1 MMOL/L — SIGNIFICANT CHANGE UP (ref 3.5–5.3)
PROCALCITONIN SERPL-MCNC: 0.03 NG/ML — SIGNIFICANT CHANGE UP (ref 0.02–0.1)
PROT SERPL-MCNC: 6.5 GM/DL — SIGNIFICANT CHANGE UP (ref 6–8.3)
RBC # BLD: 3.88 M/UL — LOW (ref 4.2–5.8)
RBC # FLD: 13.6 % — SIGNIFICANT CHANGE UP (ref 10.3–14.5)
SODIUM SERPL-SCNC: 134 MMOL/L — LOW (ref 135–145)
TSH SERPL-MCNC: 1.74 UIU/ML — SIGNIFICANT CHANGE UP (ref 0.36–3.74)
VALPROATE SERPL-MCNC: 71 UG/ML — SIGNIFICANT CHANGE UP (ref 50–100)
WBC # BLD: 5.45 K/UL — SIGNIFICANT CHANGE UP (ref 3.8–10.5)
WBC # FLD AUTO: 5.45 K/UL — SIGNIFICANT CHANGE UP (ref 3.8–10.5)

## 2024-10-03 PROCEDURE — 95816 EEG AWAKE AND DROWSY: CPT | Mod: 26

## 2024-10-03 PROCEDURE — 99232 SBSQ HOSP IP/OBS MODERATE 35: CPT

## 2024-10-03 RX ORDER — DILTIAZEM HCL 300 MG
30 CAPSULE, EXTENDED RELEASE 24HR ORAL
Refills: 0 | Status: DISCONTINUED | OUTPATIENT
Start: 2024-10-03 | End: 2024-10-04

## 2024-10-03 RX ORDER — DILTIAZEM HCL 300 MG
60 CAPSULE, EXTENDED RELEASE 24HR ORAL DAILY
Refills: 0 | Status: DISCONTINUED | OUTPATIENT
Start: 2024-10-03 | End: 2024-10-03

## 2024-10-03 RX ORDER — DILTIAZEM HCL 300 MG
1 CAPSULE, EXTENDED RELEASE 24HR ORAL
Qty: 0 | Refills: 0 | DISCHARGE
Start: 2024-10-03

## 2024-10-03 RX ADMIN — Medication 75 MILLIGRAM(S): at 13:50

## 2024-10-03 RX ADMIN — Medication 500 MILLIGRAM(S): at 10:07

## 2024-10-03 RX ADMIN — Medication 30 MILLIGRAM(S): at 18:46

## 2024-10-03 RX ADMIN — ATORVASTATIN CALCIUM 20 MILLIGRAM(S): 10 TABLET, FILM COATED ORAL at 21:27

## 2024-10-03 RX ADMIN — Medication 1 DOSE(S): at 18:46

## 2024-10-03 RX ADMIN — Medication 125 MILLILITER(S): at 06:30

## 2024-10-03 RX ADMIN — Medication 81 MILLIGRAM(S): at 13:50

## 2024-10-03 RX ADMIN — Medication 500 MILLIGRAM(S): at 21:27

## 2024-10-03 RX ADMIN — Medication 1 DOSE(S): at 06:31

## 2024-10-03 NOTE — DISCHARGE NOTE PROVIDER - HOSPITAL COURSE
78 years old male with h/o HTN, HLD, COPD, schizophrenia, mild intellectual disability was brought in from group home for AMS. At baseline, patient is ambulatory, able to engage in some conversation. Patient was noted to be altered, unresponsive at around 9:15AM while sitting on chair. No loss of bladder or bowel control. No seizure like activity. Patient was fine today AM before the incidence. Was hypotensive to 77/44. Took BP meds in AM. Denied any urinary symptoms, fever or chills. Patient has some cough.   Hemodynamically stable, afebrile, sat well at RA. No leukocytosis, plt 235, Cr 0.46. UA negative for infection. CT head with no acute pathology (02 Oct 2024 16:07)    Pt admitted to hospital for ams secondary to hypotension. Afebrile, no leukocytosis, UA negative for UTI. CXR with no focal consolidation. CT head  with no acute pathology. EEG negative. Pt evaluated the following morning. Aide at bedside. Reports patient back to baseline. Pt remained hemodynamically stable and seemed stable for discharge.

## 2024-10-03 NOTE — DISCHARGE NOTE PROVIDER - PROVIDER TOKENS
Pt states he fell from a 10ft from a 12 ft ladder. He states his breath was \"knocked out of him\" pt complains of right hip, chest and back pain. Pt also states he is having a hard time with his voice since fall. FREE:[LAST:[Your PCP],PHONE:[(   )    -],FAX:[(   )    -],ADDRESS:[Please see your primary care provider in 1-2 weeks for a post hospital follow up appointment]]

## 2024-10-03 NOTE — PROGRESS NOTE ADULT - PROBLEM SELECTOR PLAN 4
mental status at baseline  resume escitalopram 20mg daily and olanzapine  Continue Depakote ER 500mg bid

## 2024-10-03 NOTE — DISCHARGE NOTE PROVIDER - ATTENDING DISCHARGE PHYSICAL EXAMINATION:
GENERAL: NAD, well-groomed, well-developed  HEAD:  Atraumatic, Normocephalic  EYES: EOMI, sclera non-icteric  ENMT:  Moist mucous membranes  NERVOUS SYSTEM:  alert but not oriented to name, location, situation, rocking back and forth in chair  CHEST/LUNG: Clear to percussion bilaterally; No rales, rhonchi, wheezing, or rubs  HEART: Regular rate and rhythm; No murmurs, rubs, or gallops  ABDOMEN: Soft, Nontender, Nondistended; Bowel sounds present  EXTREMITIES:  2+ Peripheral Pulses  SKIN: No rashes or lesions

## 2024-10-03 NOTE — DISCHARGE NOTE PROVIDER - NSDCACTIVITY_GEN_ALL_CORE
[facility]  [address]  [address]  Phone: [phone]               June 28, 2022    Patient: Chris Armstrong   YOB: 1979   Date of Visit: 6/27/2022       To Whom It May Concern:    Celia Waggoner was seen and treated in our emergency department on 6/27/2022. She may return to work on 6/29/22.       Sincerely,       Tricia Maurer RN         Signature:__________________________________ Activity as tolerated

## 2024-10-03 NOTE — EEG REPORT - NS EEG TEXT BOX
JAZMYNE COOK [id number]     Study Date: 10-03-24  Duration: 20 min    --------------------------------------------------------------------------------------------------  History:  CC/ HPI Patient is a 78y old  Male who presents with a chief complaint of AMS (03 Oct 2024 14:01)    MEDICATIONS  (STANDING):  aspirin enteric coated 81 milliGRAM(s) Oral daily  atorvastatin 20 milliGRAM(s) Oral at bedtime  clopidogrel Tablet 75 milliGRAM(s) Oral daily  diltiazem    Tablet 30 milliGRAM(s) Oral two times a day  divalproex  milliGRAM(s) Oral <User Schedule>  fluticasone propionate/ salmeterol 100-50 MICROgram(s) Diskus 1 Dose(s) Inhalation two times a day    --------------------------------------------------------------------------------------------------  Study Interpretation:    [[[Abbreviation Key:  PDR=alpha rhythm/posterior dominant rhythm. A-P=anterior posterior gradient.  Amplitude: ‘very low’:<20; ‘low’:20-50; ‘medium’:; ‘high’:>200uV.  Persistence for periodic/rhythmic patterns (% of epoch) ‘rare’:<1%; ‘occasional’:1-10%; ‘frequent’:10-50%; ‘abundant’:50-90%; ‘continuous’:>90%.  Persistence for sporadic discharges: ‘rare’:<1/hr; ‘occasional’:1/min-1/hr; ‘frequent’:>1/min; ‘abundant’:>1/10 sec.  GRDA=generalized rhythmic delta activity; FIRDA=frontal intermittent GRDA; LRDA=lateralized rhythmic delta activity; TIRDA=temporal intermittent rhythmic delta activity;  LPD=PLED=lateralized periodic discharges; GPD=generalized periodic discharges; BiPDs=BiPLEDs=bilateral independent periodic epileptiform discharges; SIRPID=stimulus induced rhythmic, periodic, or ictal appearing discharges; BIRDs=brief potentially ictal rhythmic discharges >4 Hz, lasting .5-10s; PFA=paroxysmal bursts of beta/gamma; LVFA=low voltage fast activity.  Modifiers: +F=with fast component; +S=with spike component; +R=with rhythmic component.  S-B=burst suppression pattern.  Max=maximal. N1-drowsy; N2-stage II sleep; N3-slow wave sleep. SSS/BETS=small sharp spikes/benign epileptiform transients of sleep. HV=hyperventilation; PS=photic stimulation]]]    FINDINGS:      Background:  Continuity: Continuous  Symmetry: Symmetric  PDR: 8.5 - 9 Hz activity, with amplitude to 40 uV, that attenuated to eye opening.    Reactivity: Present  Voltage: Normal (between 20-150uV)  Anterior Posterior Gradient: Present  Other background findings: None  Breach: Absent    Background Slowing:  Generalized slowing: None was present.  Focal slowing: None was present.    State Changes:   None    Sporadic Epileptiform Discharges:    None    Rhythmic and Periodic Patterns (RPPs):  None     Electrographic and Electroclinical seizures:  None    Other Clinical Events:  None    Activation Procedures:   -Hyperventilation was not performed.    -Photic stimulation was performed and did not elicit any abnormalities.       Artifacts:  Intermittent myogenic and movement artifacts were noted.    ECG:  The heart rate on single channel ECG was predominantly between 80 - 90 BPM.    EEG Classification / Summary:  Normal EEG study in the awake state    -----------------------------------------------------------------------------------------------------    Clinical Impression:  Normal EEG study.  There were no epileptiform abnormalities recorded.      This is a preliminary impression still pending attendings attestation.     -------------------------------------------------------------------------------------------------------  [facility] EEG Reading Room Ph#: [phone]  Epilepsy Answering Service after 5PM and before 8:30AM: Ph#: [phone]    Edmond Harris DO  Epilepsy Fellow   JAZMYNE COOK [id number]     Study Date: 10-03-24  Duration: 20 min    --------------------------------------------------------------------------------------------------  History:  CC/ HPI Patient is a 78y old  Male who presents with a chief complaint of AMS (03 Oct 2024 14:01)    MEDICATIONS  (STANDING):  aspirin enteric coated 81 milliGRAM(s) Oral daily  atorvastatin 20 milliGRAM(s) Oral at bedtime  clopidogrel Tablet 75 milliGRAM(s) Oral daily  diltiazem    Tablet 30 milliGRAM(s) Oral two times a day  divalproex  milliGRAM(s) Oral <User Schedule>  fluticasone propionate/ salmeterol 100-50 MICROgram(s) Diskus 1 Dose(s) Inhalation two times a day    --------------------------------------------------------------------------------------------------  Study Interpretation:    [[[Abbreviation Key:  PDR=alpha rhythm/posterior dominant rhythm. A-P=anterior posterior gradient.  Amplitude: ‘very low’:<20; ‘low’:20-50; ‘medium’:; ‘high’:>200uV.  Persistence for periodic/rhythmic patterns (% of epoch) ‘rare’:<1%; ‘occasional’:1-10%; ‘frequent’:10-50%; ‘abundant’:50-90%; ‘continuous’:>90%.  Persistence for sporadic discharges: ‘rare’:<1/hr; ‘occasional’:1/min-1/hr; ‘frequent’:>1/min; ‘abundant’:>1/10 sec.  GRDA=generalized rhythmic delta activity; FIRDA=frontal intermittent GRDA; LRDA=lateralized rhythmic delta activity; TIRDA=temporal intermittent rhythmic delta activity;  LPD=PLED=lateralized periodic discharges; GPD=generalized periodic discharges; BiPDs=BiPLEDs=bilateral independent periodic epileptiform discharges; SIRPID=stimulus induced rhythmic, periodic, or ictal appearing discharges; BIRDs=brief potentially ictal rhythmic discharges >4 Hz, lasting .5-10s; PFA=paroxysmal bursts of beta/gamma; LVFA=low voltage fast activity.  Modifiers: +F=with fast component; +S=with spike component; +R=with rhythmic component.  S-B=burst suppression pattern.  Max=maximal. N1-drowsy; N2-stage II sleep; N3-slow wave sleep. SSS/BETS=small sharp spikes/benign epileptiform transients of sleep. HV=hyperventilation; PS=photic stimulation]]]    FINDINGS:      Background:  Continuity: Continuous  Symmetry: Symmetric  PDR: 8.5-9 Hz, attenuated to eye opening.    Voltage: Normal  Anterior Posterior Gradient: Present  Other background findings: None  Breach: Absent    Background Slowing:  Generalized slowing: None  Focal slowing: None    State Changes:   Drowsiness and stage 2 sleep not captured.    Sporadic Epileptiform Discharges:    None    Rhythmic and Periodic Patterns (RPPs):  None     Electrographic and Electroclinical seizures:  None    Other Clinical Events:  None    Activation Procedures:   -Hyperventilation was not performed.    -Photic stimulation was performed and did not elicit any abnormalities.       Artifacts:  Intermittent myogenic and movement artifacts were present.    Single-lead EKG: Regular rhythm    EEG Classification / Summary:  Normal routine EEG in the awake state.  No focal or epileptiform abnormalities captured.  No seizures.    Clinical Impression:  A normal routine EEG neither refutes nor supports a diagnosis of epilepsy.  No epileptiform abnormalities or seizures captured.     -------------------------------------------------------------------------------------------------------  [facility] EEG Reading Room Ph#: [phone]  Epilepsy Answering Service after 5PM and before 8:30AM: Ph#: [phone]    Edmond Harris DO  Epilepsy Fellow    Marlena Christine MD  Attending Physician, [facility]ile[facility] [place]

## 2024-10-03 NOTE — DISCHARGE NOTE PROVIDER - NSDCMRMEDTOKEN_GEN_ALL_CORE_FT
Aspirin Enteric Coated 81 mg oral delayed release tablet: 1 tab(s) orally once a day  atorvastatin 20 mg oral tablet: 1 tab(s) orally once a day  Breo Ellipta 100 mcg-25 mcg/inh inhalation powder: 1 puff(s) inhaled once a day  Cardizem 30 mg oral tablet: 1 tab(s) orally 2 times a day  CeleXA 20 mg oral tablet: 1 tab(s) orally once a day  clopidogrel 75 mg oral tablet: 1 tab(s) orally once a day  Colace 100 mg oral capsule: 1 cap(s) orally 2 times a day  Daily-Camila Men&#x27;s Formula: 400 microgram(s) orally once a day  Depakote  mg oral tablet, extended release: orally 2 times a day  lactulose 10 g/15 mL oral solution: orally once a day  Myrbetriq 25 mg oral tablet, extended release: 1 tab(s) orally once a day  OLANZapine 20 mg oral tablet: 1 tab(s) orally once a day  Ventolin HFA: 200  inhaled every 4 hours, As Needed

## 2024-10-03 NOTE — PROGRESS NOTE ADULT - PROBLEM SELECTOR PLAN 1
brought in with AMS  Was hypotensive to 77/44. Took BP meds in AM. Improved without intervention  Afebrile, no leukocytosis, UA negative for UTI  CXR with no focal consolidation  CT head  with no acute pathology  suspect AMS from episode of hypotension  Pt now at baseline  on aspirin and plavix at home    DISPO: group home

## 2024-10-03 NOTE — PROGRESS NOTE ADULT - SUBJECTIVE AND OBJECTIVE BOX
PROGRESS NOTE:     Patient is a 78y old  Male who presents with a chief complaint of AMS (02 Oct 2024 16:07)          SUBJECTIVE & OBJECTIVE:   Pt seen and examined at bedside in AM. Aide at bedside. reports pt is at baseline    no overnight events.       REVIEW OF SYSTEMS: remaining ROS negative     PHYSICAL EXAM:  VITALS:  Vital Signs Last 24 Hrs  T(C): 37 (03 Oct 2024 10:21), Max: 37 (03 Oct 2024 10:21)  T(F): 98.6 (03 Oct 2024 10:21), Max: 98.6 (03 Oct 2024 10:21)  HR: 96 (03 Oct 2024 10:21) (70 - 96)  BP: 149/82 (03 Oct 2024 10:21) (128/70 - 152/81)  BP(mean): 98 (02 Oct 2024 18:34) (98 - 98)  RR: 18 (03 Oct 2024 10:21) (16 - 22)  SpO2: 97% (03 Oct 2024 10:21) (95% - 98%)    Parameters below as of 03 Oct 2024 05:20  Patient On (Oxygen Delivery Method): nasal cannula          GENERAL: NAD,  no increased WOB  HEAD:  Atraumatic, Normocephalic  EYES: EOMI,  conjunctiva and sclera clear  ENMT: Moist mucous membranes  NECK: Supple, No JVD  NERVOUS SYSTEM:  Alert  CHEST/LUNG: Clear to auscultation bilaterally; No rales, rhonchi, wheezing  HEART: Regular rate and rhythm  ABDOMEN: Soft, Nontender, Nondistended; Bowel sounds present  EXTREMITIES:  No clubbing, cyanosis, calf tenderness or edema b/l      MEDICATIONS  (STANDING):  aspirin enteric coated 81 milliGRAM(s) Oral daily  atorvastatin 20 milliGRAM(s) Oral at bedtime  clopidogrel Tablet 75 milliGRAM(s) Oral daily  divalproex  milliGRAM(s) Oral <User Schedule>  fluticasone propionate/ salmeterol 100-50 MICROgram(s) Diskus 1 Dose(s) Inhalation two times a day  lactated ringers. 1000 milliLiter(s) (125 mL/Hr) IV Continuous <Continuous>    MEDICATIONS  (PRN):  acetaminophen     Tablet .. 650 milliGRAM(s) Oral every 6 hours PRN Temp greater or equal to 38C (100.4F), Mild Pain (1 - 3), Moderate Pain (4 - 6)  albuterol    90 MICROgram(s) HFA Inhaler 2 Puff(s) Inhalation two times a day PRN Shortness of Breath and/or Wheezing  albuterol/ipratropium for Nebulization 3 milliLiter(s) Nebulizer every 6 hours PRN Shortness of Breath and/or Wheezing  lactulose Syrup 10 Gram(s) Oral daily PRN constipation      Allergies    betadine (Unknown)  iodine (Unknown)    Intolerances              LABS:                           12.5   5.45  )-----------( 232      ( 03 Oct 2024 07:40 )             36.7     10-03    134[L]  |  100  |  10  ----------------------------<  84  4.1   |  29  |  0.47[L]    Ca    9.0      03 Oct 2024 07:40  Phos  3.8     10-03  Mg     1.9     10-03    TPro  6.5  /  Alb  2.9[L]  /  TBili  0.4  /  DBili  x   /  AST  23  /  ALT  34  /  AlkPhos  88  10-03      Urinalysis Basic - ( 03 Oct 2024 07:40 )    Color: x / Appearance: x / SG: x / pH: x  Gluc: 84 mg/dL / Ketone: x  / Bili: x / Urobili: x   Blood: x / Protein: x / Nitrite: x   Leuk Esterase: x / RBC: x / WBC x   Sq Epi: x / Non Sq Epi: x / Bacteria: x      CAPILLARY BLOOD GLUCOSE                    RECENT CULTURES:          RADIOLOGY & ADDITIONAL TESTS:    < from: CT Head No Cont (10.02.24 @ 12:52) >  IMPRESSION:  Mild chronic microvascular changes without evidence of an   acute transcortical infarction or hemorrhage.    < end of copied text >

## 2024-10-03 NOTE — DISCHARGE NOTE PROVIDER - CARE PROVIDER_API CALL
Your PCP,   Please see your primary care provider in 1-2 weeks for a post hospital follow up appointment  Phone: (   )    -  Fax: (   )    -  Follow Up Time:

## 2024-10-04 VITALS
HEART RATE: 76 BPM | TEMPERATURE: 98 F | SYSTOLIC BLOOD PRESSURE: 146 MMHG | RESPIRATION RATE: 18 BRPM | OXYGEN SATURATION: 93 % | DIASTOLIC BLOOD PRESSURE: 89 MMHG

## 2024-10-04 LAB
CULTURE RESULTS: NO GROWTH — SIGNIFICANT CHANGE UP
FOLATE SERPL-MCNC: >20 NG/ML — SIGNIFICANT CHANGE UP
SPECIMEN SOURCE: SIGNIFICANT CHANGE UP

## 2024-10-04 PROCEDURE — 99239 HOSP IP/OBS DSCHRG MGMT >30: CPT

## 2024-10-04 RX ADMIN — Medication 30 MILLIGRAM(S): at 18:30

## 2024-10-04 RX ADMIN — Medication 30 MILLIGRAM(S): at 05:49

## 2024-10-04 RX ADMIN — Medication 1 DOSE(S): at 18:30

## 2024-10-04 RX ADMIN — Medication 500 MILLIGRAM(S): at 07:27

## 2024-10-04 RX ADMIN — Medication 75 MILLIGRAM(S): at 11:53

## 2024-10-04 RX ADMIN — Medication 81 MILLIGRAM(S): at 11:54

## 2024-10-04 RX ADMIN — Medication 1 DOSE(S): at 05:49

## 2024-10-04 NOTE — CHART NOTE - NSCHARTNOTEFT_GEN_A_CORE
Patient seen for MST score 2 or > ( unsure of weight loss ) nutrition risk rescreening. Patient has been screened for and presents negative for    -Pressure ulcers stage 2 or greater  -Enteral or Parenteral Nutrition  -BMI <18  -QepzchdiwtC3W >8  -Chewing/Swallowing Difficulty  -Decreased appetite  -Unintentional Weight Loss  -Inadequate diet (i.e. NPO/Clear Liquids)    No intervention required at this time. RD remains available. Pt is Consult received for "MST Score = />2." Upon chart review, patient with stable weight, does not currently meet criteria for Protein Calorie Malnutrition risk per MST. RD remains available for assessment per protocol or as needed. Pt seen for AMS 2/2 to hypotension; benign essential HTN ; COPD ; Schizophrenia; intellectual disability ; HLD. Unable to obtain information as pt w/ intellectual disability. Pt resides at group home. No aide at bedside. No reports of N/V/D/C/Chewing/Swallowing issues, No food allergies. Tolerating Easy to Chew diet, consuming 75% meals.  Pt is for discharge today.

## 2024-10-04 NOTE — DISCHARGE NOTE NURSING/CASE MANAGEMENT/SOCIAL WORK - NSDCVIVACCINE_GEN_ALL_CORE_FT
Tdap; 15-Jul-2023 23:06; Bessy Whitaker (RN); [employer]; [id number] (Exp. Date: 19-Sep-2025); IntraMuscular; Deltoid Left.; 0.5 milliLiter(s); VIS (VIS Published: 09-May-2013, VIS Presented: 15-Jul-2023);

## 2024-10-04 NOTE — DISCHARGE NOTE NURSING/CASE MANAGEMENT/SOCIAL WORK - PATIENT PORTAL LINK FT
You can access the FollowMyHealth Patient Portal offered by [facility] by registering at the following website: [email or web address]. By joining [employer]’s FollowMyHealth portal, you will also be able to view your health information using other applications (apps) compatible with our system.

## 2024-10-04 NOTE — DISCHARGE NOTE NURSING/CASE MANAGEMENT/SOCIAL WORK - NSDCPEFALRISK_GEN_ALL_CORE
For information on Fall & Injury Prevention, visit: [email or web address] OR  [email or web address] OR  https://www.cdc.gov/steadi/patient.html

## 2024-10-11 DIAGNOSIS — Z79.82 LONG TERM (CURRENT) USE OF ASPIRIN: ICD-10-CM

## 2024-10-11 DIAGNOSIS — F70 MILD INTELLECTUAL DISABILITIES: ICD-10-CM

## 2024-10-11 DIAGNOSIS — J44.9 CHRONIC OBSTRUCTIVE PULMONARY DISEASE, UNSPECIFIED: ICD-10-CM

## 2024-10-11 DIAGNOSIS — R41.82 ALTERED MENTAL STATUS, UNSPECIFIED: ICD-10-CM

## 2024-10-11 DIAGNOSIS — F20.9 SCHIZOPHRENIA, UNSPECIFIED: ICD-10-CM

## 2024-10-11 DIAGNOSIS — I95.9 HYPOTENSION, UNSPECIFIED: ICD-10-CM

## 2024-10-11 DIAGNOSIS — Z88.8 ALLERGY STATUS TO OTHER DRUGS, MEDICAMENTS AND BIOLOGICAL SUBSTANCES: ICD-10-CM

## 2024-10-11 DIAGNOSIS — E78.5 HYPERLIPIDEMIA, UNSPECIFIED: ICD-10-CM

## 2024-10-11 DIAGNOSIS — Z11.52 ENCOUNTER FOR SCREENING FOR COVID-19: ICD-10-CM

## 2024-10-11 DIAGNOSIS — Z79.02 LONG TERM (CURRENT) USE OF ANTITHROMBOTICS/ANTIPLATELETS: ICD-10-CM

## 2024-10-11 DIAGNOSIS — Z91.041 RADIOGRAPHIC DYE ALLERGY STATUS: ICD-10-CM

## 2024-10-11 DIAGNOSIS — I10 ESSENTIAL (PRIMARY) HYPERTENSION: ICD-10-CM

## 2024-10-17 ENCOUNTER — INPATIENT (INPATIENT)
Facility: HOSPITAL | Age: 79
LOS: 0 days | Discharge: NURSING, REHAB OR LONG-TERM CARE | End: 2024-10-18
Attending: INTERNAL MEDICINE | Admitting: INTERNAL MEDICINE
Payer: MEDICARE

## 2024-10-17 VITALS
RESPIRATION RATE: 20 BRPM | DIASTOLIC BLOOD PRESSURE: 37 MMHG | HEART RATE: 80 BPM | HEIGHT: 68 IN | WEIGHT: 175.05 LBS | TEMPERATURE: 98 F | SYSTOLIC BLOOD PRESSURE: 58 MMHG

## 2024-10-17 DIAGNOSIS — E87.1 HYPO-OSMOLALITY AND HYPONATREMIA: ICD-10-CM

## 2024-10-17 DIAGNOSIS — I10 ESSENTIAL (PRIMARY) HYPERTENSION: ICD-10-CM

## 2024-10-17 DIAGNOSIS — J44.9 CHRONIC OBSTRUCTIVE PULMONARY DISEASE, UNSPECIFIED: ICD-10-CM

## 2024-10-17 DIAGNOSIS — F20.9 SCHIZOPHRENIA, UNSPECIFIED: ICD-10-CM

## 2024-10-17 DIAGNOSIS — E78.5 HYPERLIPIDEMIA, UNSPECIFIED: ICD-10-CM

## 2024-10-17 DIAGNOSIS — R41.82 ALTERED MENTAL STATUS, UNSPECIFIED: ICD-10-CM

## 2024-10-17 LAB
ALBUMIN SERPL ELPH-MCNC: 2.9 G/DL — LOW (ref 3.3–5)
ALP SERPL-CCNC: 66 U/L — SIGNIFICANT CHANGE UP (ref 40–120)
ALT FLD-CCNC: 25 U/L — SIGNIFICANT CHANGE UP (ref 12–78)
ANION GAP SERPL CALC-SCNC: 4 MMOL/L — LOW (ref 5–17)
ANION GAP SERPL CALC-SCNC: 7 MMOL/L — SIGNIFICANT CHANGE UP (ref 5–17)
APPEARANCE UR: CLEAR — SIGNIFICANT CHANGE UP
APTT BLD: 41.6 SEC — HIGH (ref 24.5–35.6)
AST SERPL-CCNC: 20 U/L — SIGNIFICANT CHANGE UP (ref 15–37)
BASOPHILS # BLD AUTO: 0.01 K/UL — SIGNIFICANT CHANGE UP (ref 0–0.2)
BASOPHILS NFR BLD AUTO: 0.2 % — SIGNIFICANT CHANGE UP (ref 0–2)
BILIRUB SERPL-MCNC: 0.3 MG/DL — SIGNIFICANT CHANGE UP (ref 0.2–1.2)
BILIRUB UR-MCNC: NEGATIVE — SIGNIFICANT CHANGE UP
BUN SERPL-MCNC: 11 MG/DL — SIGNIFICANT CHANGE UP (ref 7–23)
BUN SERPL-MCNC: 11 MG/DL — SIGNIFICANT CHANGE UP (ref 7–23)
CALCIUM SERPL-MCNC: 8.5 MG/DL — SIGNIFICANT CHANGE UP (ref 8.5–10.1)
CALCIUM SERPL-MCNC: 8.9 MG/DL — SIGNIFICANT CHANGE UP (ref 8.5–10.1)
CHLORIDE SERPL-SCNC: 91 MMOL/L — LOW (ref 96–108)
CHLORIDE SERPL-SCNC: 99 MMOL/L — SIGNIFICANT CHANGE UP (ref 96–108)
CO2 SERPL-SCNC: 30 MMOL/L — SIGNIFICANT CHANGE UP (ref 22–31)
CO2 SERPL-SCNC: 31 MMOL/L — SIGNIFICANT CHANGE UP (ref 22–31)
COLOR SPEC: YELLOW — SIGNIFICANT CHANGE UP
CREAT SERPL-MCNC: 0.46 MG/DL — LOW (ref 0.5–1.3)
CREAT SERPL-MCNC: 0.78 MG/DL — SIGNIFICANT CHANGE UP (ref 0.5–1.3)
DIFF PNL FLD: NEGATIVE — SIGNIFICANT CHANGE UP
EGFR: 108 ML/MIN/1.73M2 — SIGNIFICANT CHANGE UP
EGFR: 92 ML/MIN/1.73M2 — SIGNIFICANT CHANGE UP
EOSINOPHIL # BLD AUTO: 0.04 K/UL — SIGNIFICANT CHANGE UP (ref 0–0.5)
EOSINOPHIL NFR BLD AUTO: 0.7 % — SIGNIFICANT CHANGE UP (ref 0–6)
FLUAV AG NPH QL: SIGNIFICANT CHANGE UP
FLUBV AG NPH QL: SIGNIFICANT CHANGE UP
GLUCOSE SERPL-MCNC: 102 MG/DL — HIGH (ref 70–99)
GLUCOSE SERPL-MCNC: 215 MG/DL — HIGH (ref 70–99)
GLUCOSE UR QL: NEGATIVE MG/DL — SIGNIFICANT CHANGE UP
HCT VFR BLD CALC: 30.8 % — LOW (ref 39–50)
HGB BLD-MCNC: 10.7 G/DL — LOW (ref 13–17)
IMM GRANULOCYTES NFR BLD AUTO: 0.9 % — SIGNIFICANT CHANGE UP (ref 0–0.9)
INR BLD: 1.24 RATIO — HIGH (ref 0.85–1.16)
KETONES UR-MCNC: NEGATIVE MG/DL — SIGNIFICANT CHANGE UP
LACTATE SERPL-SCNC: 1 MMOL/L — SIGNIFICANT CHANGE UP (ref 0.7–2)
LACTATE SERPL-SCNC: 1.5 MMOL/L — SIGNIFICANT CHANGE UP (ref 0.7–2)
LACTATE SERPL-SCNC: 2.3 MMOL/L — HIGH (ref 0.7–2)
LEUKOCYTE ESTERASE UR-ACNC: NEGATIVE — SIGNIFICANT CHANGE UP
LYMPHOCYTES # BLD AUTO: 1.55 K/UL — SIGNIFICANT CHANGE UP (ref 1–3.3)
LYMPHOCYTES # BLD AUTO: 28.9 % — SIGNIFICANT CHANGE UP (ref 13–44)
MCHC RBC-ENTMCNC: 32.2 PG — SIGNIFICANT CHANGE UP (ref 27–34)
MCHC RBC-ENTMCNC: 34.7 G/DL — SIGNIFICANT CHANGE UP (ref 32–36)
MCV RBC AUTO: 92.8 FL — SIGNIFICANT CHANGE UP (ref 80–100)
MONOCYTES # BLD AUTO: 0.5 K/UL — SIGNIFICANT CHANGE UP (ref 0–0.9)
MONOCYTES NFR BLD AUTO: 9.3 % — SIGNIFICANT CHANGE UP (ref 2–14)
NEUTROPHILS # BLD AUTO: 3.21 K/UL — SIGNIFICANT CHANGE UP (ref 1.8–7.4)
NEUTROPHILS NFR BLD AUTO: 60 % — SIGNIFICANT CHANGE UP (ref 43–77)
NITRITE UR-MCNC: NEGATIVE — SIGNIFICANT CHANGE UP
NRBC # BLD: 0 /100 WBCS — SIGNIFICANT CHANGE UP (ref 0–0)
PCP SPEC-MCNC: SIGNIFICANT CHANGE UP
PH UR: 7.5 — SIGNIFICANT CHANGE UP (ref 5–8)
PLATELET # BLD AUTO: 204 K/UL — SIGNIFICANT CHANGE UP (ref 150–400)
POTASSIUM SERPL-MCNC: 4.1 MMOL/L — SIGNIFICANT CHANGE UP (ref 3.5–5.3)
POTASSIUM SERPL-MCNC: 4.8 MMOL/L — SIGNIFICANT CHANGE UP (ref 3.5–5.3)
POTASSIUM SERPL-SCNC: 4.1 MMOL/L — SIGNIFICANT CHANGE UP (ref 3.5–5.3)
POTASSIUM SERPL-SCNC: 4.8 MMOL/L — SIGNIFICANT CHANGE UP (ref 3.5–5.3)
PROT SERPL-MCNC: 6.1 GM/DL — SIGNIFICANT CHANGE UP (ref 6–8.3)
PROT UR-MCNC: NEGATIVE MG/DL — SIGNIFICANT CHANGE UP
PROTHROM AB SERPL-ACNC: 14.3 SEC — HIGH (ref 9.9–13.4)
RBC # BLD: 3.32 M/UL — LOW (ref 4.2–5.8)
RBC # FLD: 13.5 % — SIGNIFICANT CHANGE UP (ref 10.3–14.5)
SARS-COV-2 RNA SPEC QL NAA+PROBE: SIGNIFICANT CHANGE UP
SODIUM SERPL-SCNC: 128 MMOL/L — LOW (ref 135–145)
SODIUM SERPL-SCNC: 134 MMOL/L — LOW (ref 135–145)
SP GR SPEC: 1 — SIGNIFICANT CHANGE UP (ref 1–1.03)
TROPONIN I, HIGH SENSITIVITY RESULT: 7.4 NG/L — SIGNIFICANT CHANGE UP
UROBILINOGEN FLD QL: 0.2 MG/DL — SIGNIFICANT CHANGE UP (ref 0.2–1)
WBC # BLD: 5.36 K/UL — SIGNIFICANT CHANGE UP (ref 3.8–10.5)
WBC # FLD AUTO: 5.36 K/UL — SIGNIFICANT CHANGE UP (ref 3.8–10.5)

## 2024-10-17 PROCEDURE — 99285 EMERGENCY DEPT VISIT HI MDM: CPT

## 2024-10-17 PROCEDURE — 99222 1ST HOSP IP/OBS MODERATE 55: CPT

## 2024-10-17 PROCEDURE — 93010 ELECTROCARDIOGRAM REPORT: CPT

## 2024-10-17 PROCEDURE — 70450 CT HEAD/BRAIN W/O DYE: CPT | Mod: 26,MC

## 2024-10-17 PROCEDURE — 71045 X-RAY EXAM CHEST 1 VIEW: CPT | Mod: 26

## 2024-10-17 RX ORDER — TAMSULOSIN HCL 0.4 MG
1 CAPSULE ORAL
Refills: 0 | DISCHARGE

## 2024-10-17 RX ORDER — LACTULOSE 10 G/15ML
10 SOLUTION ORAL; RECTAL DAILY
Refills: 0 | Status: DISCONTINUED | OUTPATIENT
Start: 2024-10-17 | End: 2024-10-18

## 2024-10-17 RX ORDER — ATORVASTATIN CALCIUM 10 MG/1
20 TABLET, FILM COATED ORAL AT BEDTIME
Refills: 0 | Status: DISCONTINUED | OUTPATIENT
Start: 2024-10-17 | End: 2024-10-18

## 2024-10-17 RX ORDER — OLANZAPINE 2.5 MG
0.5 TABLET ORAL
Refills: 0 | DISCHARGE

## 2024-10-17 RX ORDER — SODIUM CHLORIDE IRRIG SOLUTION 0.9 %
2500 SOLUTION, IRRIGATION IRRIGATION ONCE
Refills: 0 | Status: COMPLETED | OUTPATIENT
Start: 2024-10-17 | End: 2024-10-17

## 2024-10-17 RX ORDER — PIPERACILLIN SODIUM AND TAZOBACTAM SODIUM 12; 1.5 G/60ML; G/60ML
3.38 INJECTION, POWDER, LYOPHILIZED, FOR SOLUTION INTRAVENOUS ONCE
Refills: 0 | Status: COMPLETED | OUTPATIENT
Start: 2024-10-17 | End: 2024-10-17

## 2024-10-17 RX ORDER — MIDODRINE HYDROCHLORIDE 5 MG/1
5 TABLET ORAL THREE TIMES A DAY
Refills: 0 | Status: DISCONTINUED | OUTPATIENT
Start: 2024-10-17 | End: 2024-10-18

## 2024-10-17 RX ORDER — 5-HYDROXYTRYPTOPHAN (5-HTP) 100 MG
3 TABLET,DISINTEGRATING ORAL AT BEDTIME
Refills: 0 | Status: DISCONTINUED | OUTPATIENT
Start: 2024-10-17 | End: 2024-10-18

## 2024-10-17 RX ORDER — SODIUM CHLORIDE 0.9 % (FLUSH) 0.9 %
1000 SYRINGE (ML) INJECTION
Refills: 0 | Status: DISCONTINUED | OUTPATIENT
Start: 2024-10-17 | End: 2024-10-18

## 2024-10-17 RX ORDER — OLANZAPINE 2.5 MG
10 TABLET ORAL DAILY
Refills: 0 | Status: DISCONTINUED | OUTPATIENT
Start: 2024-10-17 | End: 2024-10-18

## 2024-10-17 RX ORDER — ASTEMIZOLE 10 MG
500 TABLET ORAL
Refills: 0 | Status: DISCONTINUED | OUTPATIENT
Start: 2024-10-17 | End: 2024-10-18

## 2024-10-17 RX ORDER — ACETAMINOPHEN 325 MG
650 TABLET ORAL EVERY 6 HOURS
Refills: 0 | Status: DISCONTINUED | OUTPATIENT
Start: 2024-10-17 | End: 2024-10-18

## 2024-10-17 RX ORDER — ASPIRIN 325 MG
81 TABLET ORAL DAILY
Refills: 0 | Status: DISCONTINUED | OUTPATIENT
Start: 2024-10-17 | End: 2024-10-18

## 2024-10-17 RX ORDER — TAMSULOSIN HCL 0.4 MG
0.4 CAPSULE ORAL AT BEDTIME
Refills: 0 | Status: DISCONTINUED | OUTPATIENT
Start: 2024-10-17 | End: 2024-10-18

## 2024-10-17 RX ORDER — SODIUM CHLORIDE IRRIG SOLUTION 0.9 %
500 SOLUTION, IRRIGATION IRRIGATION ONCE
Refills: 0 | Status: COMPLETED | OUTPATIENT
Start: 2024-10-17 | End: 2024-10-17

## 2024-10-17 RX ORDER — FLUTICASONE PROPION/SALMETEROL 100-50 MCG
1 BLISTER, WITH INHALATION DEVICE INHALATION
Refills: 0 | Status: DISCONTINUED | OUTPATIENT
Start: 2024-10-17 | End: 2024-10-18

## 2024-10-17 RX ADMIN — Medication 1 DOSE(S): at 17:14

## 2024-10-17 RX ADMIN — Medication 500 MILLIGRAM(S): at 20:13

## 2024-10-17 RX ADMIN — Medication 100 MILLILITER(S): at 17:13

## 2024-10-17 RX ADMIN — Medication 2500 MILLILITER(S): at 11:30

## 2024-10-17 RX ADMIN — Medication 0.4 MILLIGRAM(S): at 21:38

## 2024-10-17 RX ADMIN — Medication 500 MILLILITER(S): at 12:30

## 2024-10-17 RX ADMIN — PIPERACILLIN SODIUM AND TAZOBACTAM SODIUM 200 GRAM(S): 12; 1.5 INJECTION, POWDER, LYOPHILIZED, FOR SOLUTION INTRAVENOUS at 09:56

## 2024-10-17 RX ADMIN — PIPERACILLIN SODIUM AND TAZOBACTAM SODIUM 3.38 GRAM(S): 12; 1.5 INJECTION, POWDER, LYOPHILIZED, FOR SOLUTION INTRAVENOUS at 10:30

## 2024-10-17 RX ADMIN — ATORVASTATIN CALCIUM 20 MILLIGRAM(S): 10 TABLET, FILM COATED ORAL at 21:38

## 2024-10-17 RX ADMIN — Medication 2500 MILLILITER(S): at 09:10

## 2024-10-17 NOTE — ED PROVIDER NOTE - PHYSICAL EXAMINATION
General: unresponsive male   HEENT: Normocephalic, atraumatic.   Eyes: No scleral icterus. EOMI. SANJEEV.  Neck:. Soft and supple. Full ROM without pain. No midline tenderness  Cardiac: Regular rate and regular rhythm. No murmurs, rubs, gallops. Peripheral pulses 2+ and symmetric. No LE edema.  Resp: Lungs CTAB. No wheezes, rales or rhonchi.  Abd: Soft, non-tender, non-distended. No guarding or rebound. No scars, masses, or lesions.  Back: Spine midline and non-tender. No CVA tenderness.    Skin: No rashes, abrasions, or lacerations.  Neuro: AO x 0, responsive to painful stimuli

## 2024-10-17 NOTE — H&P ADULT - HIV OFFER
Quality 128: Preventive Care And Screening: Body Mass Index (Bmi) Screening And Follow-Up Plan: BMI is documented above normal parameters and a follow-up plan is documented Quality 265: Biopsy Follow-Up: Biopsy results reviewed, communicated, tracked, and documented Quality 137: Melanoma: Continuity Of Care - Recall System: Patient information entered into a recall system that includes: target date for the next exam specified AND a process to follow up with patients regarding missed or unscheduled appointments Quality 402: Tobacco Use And Help With Quitting Among Adolescents: Patient screened for tobacco and never smoked Detail Level: Detailed Quality 111:Pneumonia Vaccination Status For Older Adults: Pneumococcal Vaccination not Administered or Previously Received, Reason not Otherwise Specified Quality 431: Preventive Care And Screening: Unhealthy Alcohol Use - Screening: Patient not identified as an unhealthy alcohol user when screened for unhealthy alcohol use using a systematic screening method Quality 138: Melanoma: Coordination Of Care: A treatment plan was communicated to the physicians providing continuing care within one month of diagnosis outlining: diagnosis, tumor thickness and a plan for surgery or alternate care. Quality 397: Melanoma: Reporting: The pathology report includes a pT Category and statement on thickness and ulceration for pT1, mitotic rate. Quality 130: Documentation Of Current Medications In The Medical Record: Current Medications Documented Opt out Quality 110: Preventive Care And Screening: Influenza Immunization: Influenza Immunization Administered during Influenza season

## 2024-10-17 NOTE — PATIENT PROFILE ADULT - FUNCTIONAL ASSESSMENT - BASIC MOBILITY 6.
3-calculated by average/Not able to assess (calculate score using [facility] averaging method)

## 2024-10-17 NOTE — ED PROVIDER NOTE - CLINICAL SUMMARY MEDICAL DECISION MAKING FREE TEXT BOX
77 y/o M hx of HTN, HLD, COPD, schizophrenia, mild intellectual disability presents for AMS. per aide at beside, states he was like this since this morning. is ambulatory and talkative at baseline per aide. states he was unresponsive. unable to get hx form patient due to current medical condition.   hypotensive on arrival, weaned off NRB - arrived w/ NRB.   will check temp.   EKG NSR, no stemi   will get sepsis labs, give fluids  ct head for ams   patient responsive to painful stimuli currently.   no signs of trauma. 79 y/o M hx of HTN, HLD, COPD, schizophrenia, mild intellectual disability presents for AMS. per aide at beside, states he was like this since this morning. is ambulatory and talkative at baseline per aide. states he was unresponsive. unable to get hx form patient due to current medical condition.   hypotensive on arrival, weaned off NRB - arrived w/ NRB.   will check temp.   EKG NSR, no stemi   will get sepsis labs, give fluids  ct head for ams   patient responsive to painful stimuli currently.   no signs of trauma.    patient w/ elevated lactate/ hypothermic. no leukocytosis. improved mental status while in ER after fluids and rewarming. no acute complaints, states he is "thirsty." appears to be at baseline now. admit, blood cultures sent. empiric abx given.

## 2024-10-17 NOTE — H&P ADULT - PROBLEM SELECTOR PLAN 2
Na 128, now with episode of hypotension and hypothermia  Check serum cortisol, ACTH  recent thyroid function normal  check urine/serum osmolality, urine Na   Gentle IV NS, closely monitor serum Na, avoid over correction. Closely monitor mental status  repeat BMP ordered   Low clinical concern for infectious etiology. No respiratory, GI/ symptoms. CXR  ( I personally review) with no focal consolidation

## 2024-10-17 NOTE — PATIENT PROFILE ADULT - FALL HARM RISK - HARM RISK INTERVENTIONS
Assistance with ambulation/Assistance OOB with selected safe patient handling equipment/Communicate Risk of Fall with Harm to all staff/Reinforce activity limits and safety measures with patient and family/Tailored Fall Risk Interventions/Use of alarms - bed, chair and/or voice tab/Visual Cue: Yellow wristband and red socks/Bed in lowest position, wheels locked, appropriate side rails in place/Call bell, personal items and telephone in reach/Instruct patient to call for assistance before getting out of bed or chair/Non-slip footwear when patient is out of bed/Ruth to call system/Physically safe environment - no spills, clutter or unnecessary equipment/Purposeful Proactive Rounding/Room/bathroom lighting operational, light cord in reach

## 2024-10-17 NOTE — H&P ADULT - NSHPPHYSICALEXAM_GEN_ALL_CORE
CONSTITUTIONAL: alert and cooperative, slight lethargic  EYES: PERRL,  no scleral icterus  ENT: Mucosa moist, tongue normal  NECK: Neck supple, trachea midline, non-tender  CARDIAC: Normal S1 and S2. Regular rate and rhythms. No Pedal edema  LUNGS: Equal air entry both lungs. No rales, rhonchi, wheezing. Normal respiratory effort.   ABDOMEN: Soft, nondistended, nontender. No guarding or rebound tenderness. No hepatomegaly or splenomegaly. Bowel sound normal.   MUSCULOSKELETAL: Normocephalic, atraumatic. No significant deformity or joint abnormality  NEUROLOGICAL: No gross motor or sensory deficits  SKIN: no lesions or eruptions. Normal turgor

## 2024-10-17 NOTE — ED PROVIDER NOTE - OBJECTIVE STATEMENT
79 y/o M hx of HTN, HLD, COPD, schizophrenia, mild intellectual disability presents for AMS. per aide at beside, states he was like this since this morning. is ambulatory and talkative at baseline per aide. states he was unresponsive. unable to get hx form patient due to current medical condition.

## 2024-10-17 NOTE — H&P ADULT - HISTORY OF PRESENT ILLNESS
Talked to group home staff at bedside  78 years old male with h/o HTN, HLD, COPD, schizophrenia, mild intellectual disability was brought in from group home for AMS. At baseline, patient is ambulatory, able to engage in some conversation. Patient was noted to be altered, unresponsive while having breakfast. Denied any fever, chills, cough, chest pain, nausea, vomiting, diarrhea or urinary symptoms.   Hypotensive upon ED arrival, improved with IV fluid. Had episode of hypothermia. No leukocytosis, plt 204, lactate 2.3--> 1.5, Na 128. CT head with no acute pathology

## 2024-10-17 NOTE — ED ADULT NURSE NOTE - OBJECTIVE STATEMENT
Pt BIBEMS unresponsive after having breakfast Pt BIBEMS on NRB unresponsive after having breakfast. BP hypotensive, responsive to painful stimuli and b/l eyes pinpoint, RR 27 on cardiac monitor. Pt BIBEMS on NRB unresponsive after having breakfast. BP hypotensive, responsive to painful stimuli and b/l eyes pinpoint, RR 27 on cardiac monitor. Labs drawn, rectal temp completed. Pt noted with stage I pressure injury to mid sacrum.

## 2024-10-17 NOTE — H&P ADULT - PROBLEM SELECTOR PLAN 1
present with episode of unresponsiveness. Was hypotensive upon ED arrival, improved with IV fluid  CT head  ( I personally review) with no acute pathology  Recent admission with similar presentation. EEG negative  Likely syncope due to hypotension  Hold diltiazem. Base on BP trend, will decide decreasing dosage or stop diltiazem  Recent admission  with normal thyroid function on 10/3/24  Closely monitor BP, midodrine prn for hypotension  check orthostatic vitals

## 2024-10-18 VITALS
OXYGEN SATURATION: 93 % | DIASTOLIC BLOOD PRESSURE: 79 MMHG | RESPIRATION RATE: 18 BRPM | SYSTOLIC BLOOD PRESSURE: 124 MMHG | TEMPERATURE: 98 F | HEART RATE: 77 BPM

## 2024-10-18 LAB
ACTH SER-ACNC: 38.3 PG/ML — SIGNIFICANT CHANGE UP (ref 7.2–63.3)
ALBUMIN SERPL ELPH-MCNC: 2.9 G/DL — LOW (ref 3.3–5)
ALP SERPL-CCNC: 73 U/L — SIGNIFICANT CHANGE UP (ref 40–120)
ALT FLD-CCNC: 23 U/L — SIGNIFICANT CHANGE UP (ref 12–78)
ANION GAP SERPL CALC-SCNC: 8 MMOL/L — SIGNIFICANT CHANGE UP (ref 5–17)
AST SERPL-CCNC: 18 U/L — SIGNIFICANT CHANGE UP (ref 15–37)
BILIRUB SERPL-MCNC: 0.4 MG/DL — SIGNIFICANT CHANGE UP (ref 0.2–1.2)
BUN SERPL-MCNC: 9 MG/DL — SIGNIFICANT CHANGE UP (ref 7–23)
CALCIUM SERPL-MCNC: 8.9 MG/DL — SIGNIFICANT CHANGE UP (ref 8.5–10.1)
CHLORIDE SERPL-SCNC: 94 MMOL/L — LOW (ref 96–108)
CO2 SERPL-SCNC: 30 MMOL/L — SIGNIFICANT CHANGE UP (ref 22–31)
CORTIS AM PEAK SERPL-MCNC: 11.5 UG/DL — SIGNIFICANT CHANGE UP (ref 6–18.4)
CORTIS F PM SERPL-MCNC: 8.8 UG/DL — SIGNIFICANT CHANGE UP (ref 2.7–10.5)
CREAT SERPL-MCNC: 0.49 MG/DL — LOW (ref 0.5–1.3)
EGFR: 106 ML/MIN/1.73M2 — SIGNIFICANT CHANGE UP
GLUCOSE SERPL-MCNC: 90 MG/DL — SIGNIFICANT CHANGE UP (ref 70–99)
HCT VFR BLD CALC: 34 % — LOW (ref 39–50)
HGB BLD-MCNC: 11.7 G/DL — LOW (ref 13–17)
MAGNESIUM SERPL-MCNC: 1.5 MG/DL — LOW (ref 1.6–2.6)
MCHC RBC-ENTMCNC: 32.4 PG — SIGNIFICANT CHANGE UP (ref 27–34)
MCHC RBC-ENTMCNC: 34.4 G/DL — SIGNIFICANT CHANGE UP (ref 32–36)
MCV RBC AUTO: 94.2 FL — SIGNIFICANT CHANGE UP (ref 80–100)
NRBC # BLD: 0 /100 WBCS — SIGNIFICANT CHANGE UP (ref 0–0)
OSMOLALITY SERPL: 276 MOSMOL/KG — LOW (ref 280–301)
OSMOLALITY UR: 181 MOSM/KG — SIGNIFICANT CHANGE UP (ref 50–1200)
PHOSPHATE SERPL-MCNC: 4.4 MG/DL — SIGNIFICANT CHANGE UP (ref 2.5–4.5)
PLATELET # BLD AUTO: 188 K/UL — SIGNIFICANT CHANGE UP (ref 150–400)
POTASSIUM SERPL-MCNC: 3.9 MMOL/L — SIGNIFICANT CHANGE UP (ref 3.5–5.3)
POTASSIUM SERPL-SCNC: 3.9 MMOL/L — SIGNIFICANT CHANGE UP (ref 3.5–5.3)
PROT SERPL-MCNC: 6 GM/DL — SIGNIFICANT CHANGE UP (ref 6–8.3)
RBC # BLD: 3.61 M/UL — LOW (ref 4.2–5.8)
RBC # FLD: 13.4 % — SIGNIFICANT CHANGE UP (ref 10.3–14.5)
SODIUM SERPL-SCNC: 132 MMOL/L — LOW (ref 135–145)
SODIUM UR-SCNC: 57 MMOL/L — SIGNIFICANT CHANGE UP
URATE SERPL-MCNC: 3.1 MG/DL — LOW (ref 3.4–8.8)
WBC # BLD: 8.08 K/UL — SIGNIFICANT CHANGE UP (ref 3.8–10.5)
WBC # FLD AUTO: 8.08 K/UL — SIGNIFICANT CHANGE UP (ref 3.8–10.5)

## 2024-10-18 PROCEDURE — 99239 HOSP IP/OBS DSCHRG MGMT >30: CPT

## 2024-10-18 RX ORDER — ALBUTEROL 90 MCG
2 AEROSOL (GRAM) INHALATION EVERY 6 HOURS
Refills: 0 | Status: DISCONTINUED | OUTPATIENT
Start: 2024-10-18 | End: 2024-10-18

## 2024-10-18 RX ORDER — DILTIAZEM HCL 300 MG
1 CAPSULE, EXTENDED RELEASE 24HR ORAL
Qty: 60 | Refills: 0
Start: 2024-10-18 | End: 2024-11-16

## 2024-10-18 RX ORDER — DILTIAZEM HCL 300 MG
1 CAPSULE, EXTENDED RELEASE 24HR ORAL
Refills: 0 | DISCHARGE

## 2024-10-18 RX ORDER — MAGNESIUM SULFATE 500 MG/ML
2 VIAL (ML) INJECTION ONCE
Refills: 0 | Status: COMPLETED | OUTPATIENT
Start: 2024-10-18 | End: 2024-10-18

## 2024-10-18 RX ORDER — DILTIAZEM HCL 300 MG
60 CAPSULE, EXTENDED RELEASE 24HR ORAL
Qty: 30 | Refills: 0
Start: 2024-10-18 | End: 2024-11-16

## 2024-10-18 RX ORDER — DILTIAZEM HCL 300 MG
1 CAPSULE, EXTENDED RELEASE 24HR ORAL
Qty: 0 | Refills: 0 | DISCHARGE
Start: 2024-10-18

## 2024-10-18 RX ADMIN — Medication 500 MILLIGRAM(S): at 09:29

## 2024-10-18 RX ADMIN — Medication 81 MILLIGRAM(S): at 12:05

## 2024-10-18 RX ADMIN — LACTULOSE 10 GRAM(S): 10 SOLUTION ORAL; RECTAL at 12:04

## 2024-10-18 RX ADMIN — Medication 1 DOSE(S): at 18:05

## 2024-10-18 RX ADMIN — Medication 75 MILLIGRAM(S): at 12:05

## 2024-10-18 RX ADMIN — Medication 100 MILLILITER(S): at 08:00

## 2024-10-18 RX ADMIN — Medication 20 MILLIGRAM(S): at 12:05

## 2024-10-18 RX ADMIN — Medication 25 GRAM(S): at 12:02

## 2024-10-18 RX ADMIN — Medication 1 DOSE(S): at 05:57

## 2024-10-18 RX ADMIN — Medication 10 MILLIGRAM(S): at 12:05

## 2024-10-18 NOTE — DIETITIAN INITIAL EVALUATION ADULT - PERTINENT MEDS FT
MEDICATIONS  (STANDING):  aspirin enteric coated 81 milliGRAM(s) Oral daily  atorvastatin 20 milliGRAM(s) Oral at bedtime  citalopram 20 milliGRAM(s) Oral daily  clopidogrel Tablet 75 milliGRAM(s) Oral daily  divalproex  milliGRAM(s) Oral <User Schedule>  fluticasone propionate/ salmeterol 100-50 MICROgram(s) Diskus 1 Dose(s) Inhalation two times a day  lactulose Syrup 10 Gram(s) Oral daily  OLANZapine 10 milliGRAM(s) Oral daily  tamsulosin 0.4 milliGRAM(s) Oral at bedtime    MEDICATIONS  (PRN):  acetaminophen     Tablet .. 650 milliGRAM(s) Oral every 6 hours PRN Mild Pain (1 - 3), Moderate Pain (4 - 6)  melatonin 3 milliGRAM(s) Oral at bedtime PRN Insomnia  midodrine. 5 milliGRAM(s) Oral three times a day PRN if systolic BP less than 90

## 2024-10-18 NOTE — DIETITIAN INITIAL EVALUATION ADULT - REASON
Normal PSA  Blood in urine referral to urologist ( it is small amount - but always best to have urologist eval)  Cholesterol: Triglycerides are normal  ( short term fat storage), HDL good cholesterol is at goal,  LDL which is bad cholesterol is mildly elevated. Recommend increasing  exercise to 30 minutes daily and increased fiber intake - vegetables, fruits and oats and whole grain. Decreasing fatty food intake. We will repeat cholesterol level in one year.     No diabetes  Normal renal function Unable- pt asleep

## 2024-10-18 NOTE — DIETITIAN INITIAL EVALUATION ADULT - PERTINENT LABORATORY DATA
10-18    132[L]  |  94[L]  |  9   ----------------------------<  90  3.9   |  30  |  0.49[L]    Ca    8.9      18 Oct 2024 08:10  Phos  4.4     10-18  Mg     1.5     10-18    TPro  6.0  /  Alb  2.9[L]  /  TBili  0.4  /  DBili  x   /  AST  18  /  ALT  23  /  AlkPhos  73  10-18

## 2024-10-18 NOTE — DISCHARGE NOTE PROVIDER - NSDCMRMEDTOKEN_GEN_ALL_CORE_FT
Aspirin Enteric Coated 81 mg oral delayed release tablet: 1 tab(s) orally once a day  atorvastatin 20 mg oral tablet: 1 tab(s) orally once a day  Breo Ellipta 100 mcg-25 mcg/inh inhalation powder: 1 puff(s) inhaled once a day  CeleXA 20 mg oral tablet: 1 tab(s) orally once a day  clopidogrel 75 mg oral tablet: 1 tab(s) orally once a day  Colace 100 mg oral capsule: 1 cap(s) orally 2 times a day  Daily-Camila Men&#x27;s Formula: 400 microgram(s) orally once a day  Depakote  mg oral tablet, extended release: orally 2 times a day  DilTIAZem (Eqv-Cardizem CD) 120 mg/24 hours oral capsule, extended release: 1 cap(s) orally once a day  Flomax 0.4 mg oral capsule: 1 cap(s) orally once a day (at bedtime)  lactulose 10 g/15 mL oral solution: orally once a day  Myrbetriq 25 mg oral tablet, extended release: 1 tab(s) orally once a day  OLANZapine 20 mg oral tablet: 0.5 tab(s) orally once a day  Ventolin HFA: 200  inhaled every 4 hours, As Needed   Aspirin Enteric Coated 81 mg oral delayed release tablet: 1 tab(s) orally once a day  atorvastatin 20 mg oral tablet: 1 tab(s) orally once a day  Breo Ellipta 100 mcg-25 mcg/inh inhalation powder: 1 puff(s) inhaled once a day  CeleXA 20 mg oral tablet: 1 tab(s) orally once a day  clopidogrel 75 mg oral tablet: 1 tab(s) orally once a day  Colace 100 mg oral capsule: 1 cap(s) orally 2 times a day  Daily-Camila Men&#x27;s Formula: 400 microgram(s) orally once a day  Depakote  mg oral tablet, extended release: orally 2 times a day  dilTIAZem 60 mg/12 hours oral capsule, extended release: 60 cap(s) orally once a day  dilTIAZem 60 mg/12 hours oral capsule, extended release: 1 cap(s) orally every 12 hours  Flomax 0.4 mg oral capsule: 1 cap(s) orally once a day (at bedtime)  lactulose 10 g/15 mL oral solution: orally once a day  Myrbetriq 25 mg oral tablet, extended release: 1 tab(s) orally once a day  OLANZapine 20 mg oral tablet: 0.5 tab(s) orally once a day  Ventolin HFA: 200  inhaled every 4 hours, As Needed   Aspirin Enteric Coated 81 mg oral delayed release tablet: 1 tab(s) orally once a day  atorvastatin 20 mg oral tablet: 1 tab(s) orally once a day  Breo Ellipta 100 mcg-25 mcg/inh inhalation powder: 1 puff(s) inhaled once a day  CeleXA 20 mg oral tablet: 1 tab(s) orally once a day  clopidogrel 75 mg oral tablet: 1 tab(s) orally once a day  Colace 100 mg oral capsule: 1 cap(s) orally 2 times a day  Daily-Camila Men&#x27;s Formula: 400 microgram(s) orally once a day  Depakote  mg oral tablet, extended release: orally 2 times a day  dilTIAZem 60 mg/12 hours oral capsule, extended release: 1 cap(s) orally every 12 hours  Flomax 0.4 mg oral capsule: 1 cap(s) orally once a day (at bedtime)  lactulose 10 g/15 mL oral solution: orally once a day  Myrbetriq 25 mg oral tablet, extended release: 1 tab(s) orally once a day  OLANZapine 20 mg oral tablet: 0.5 tab(s) orally once a day  Ventolin HFA: 200  inhaled every 4 hours, As Needed

## 2024-10-18 NOTE — DISCHARGE NOTE PROVIDER - NSDCCPCAREPLAN_GEN_ALL_CORE_FT
PRINCIPAL DISCHARGE DIAGNOSIS  Diagnosis: Altered mental status  Assessment and Plan of Treatment: pt returned to baseline after fluid administration and continue with normal blood pressure and baseline mental status after fluids      SECONDARY DISCHARGE DIAGNOSES  Diagnosis: Hypotension  Assessment and Plan of Treatment: improved patient  returned to baseline after fluids     PRINCIPAL DISCHARGE DIAGNOSIS  Diagnosis: Altered mental status  Assessment and Plan of Treatment: pt returned to baseline after fluid administration and continue with normal blood pressure and baseline mental status after fluids      SECONDARY DISCHARGE DIAGNOSES  Diagnosis: Hypotension  Assessment and Plan of Treatment: improved patient  returned to baseline after fluids  cardizem 120 extended release wis changed to 60 mg day extended release .nw changed to 60 immediate release tow times a day

## 2024-10-18 NOTE — CHART NOTE - NSCHARTNOTEFT_GEN_A_CORE
received communication from group home patient has been cleared for return to group home by group home physician

## 2024-10-18 NOTE — DISCHARGE NOTE NURSING/CASE MANAGEMENT/SOCIAL WORK - PATIENT PORTAL LINK FT
You can access the FollowMyHealth Patient Portal offered by [facility] by registering at the following website: [email or web address]. By joining [facility]’s FollowMyHealth portal, you will also be able to view your health information using other applications (apps) compatible with our system.

## 2024-10-18 NOTE — CONSULT NOTE ADULT - SUBJECTIVE AND OBJECTIVE BOX
[facility] NEPHROLOGY SERVICES, [facility]  NEPHROLOGY AND HYPERTENSION  [address]  [address]  [place] [address]  [phone]    MD ROB GRADY MD YELENA ROSENBERG, MD BINNY KOSHY, MD CHRISTOPHER CAPUTO, MD EDWARD BOVER, MD      Information from chart:  "Patient is a 78y old  Male who presents with a chief complaint of AMS, hyponatremia (18 Oct 2024 15:02)    HPI:  Talked to group home staff at bedside  78 years old male with h/o HTN, HLD, COPD, schizophrenia, mild intellectual disability was brought in from group home for AMS. At baseline, patient is ambulatory, able to engage in some conversation. Patient was noted to be altered, unresponsive while having breakfast. Denied any fever, chills, cough, chest pain, nausea, vomiting, diarrhea or urinary symptoms.   Hypotensive upon ED arrival, improved with IV fluid. Had episode of hypothermia. No leukocytosis, plt 204, lactate 2.3--> 1.5, Na 128. CT head with no acute pathology  (17 Oct 2024 15:55)   "    PAST MEDICAL & SURGICAL HISTORY:  Schizophrenia      History of COPD      Intellectual disability      Benign essential HTN        FAMILY HISTORY:    Allergies    betadine (Unknown)  iodine (Unknown)    Intolerances      Home Medications:  Breo Ellipta 100 mcg-25 mcg/inh inhalation powder: 1 puff(s) inhaled once a day (23 Jan 2024 15:31)  clopidogrel 75 mg oral tablet: 1 tab(s) orally once a day (23 Jan 2024 15:31)  Colace 100 mg oral capsule: 1 cap(s) orally 2 times a day (23 Jan 2024 15:31)  Daily-Camila Men&#x27;s Formula: 400 microgram(s) orally once a day (23 Jan 2024 15:31)  Depakote  mg oral tablet, extended release: orally 2 times a day (23 Jan 2024 15:31)  Flomax 0.4 mg oral capsule: 1 cap(s) orally once a day (at bedtime) (17 Oct 2024 15:10)  lactulose 10 g/15 mL oral solution: orally once a day (23 Jan 2024 15:31)  Myrbetriq 25 mg oral tablet, extended release: 1 tab(s) orally once a day (23 Jan 2024 15:31)  OLANZapine 20 mg oral tablet: 0.5 tab(s) orally once a day (17 Oct 2024 15:08)  Ventolin HFA: 200  inhaled every 4 hours, As Needed (23 Jan 2024 15:31)    MEDICATIONS  (STANDING):  aspirin enteric coated 81 milliGRAM(s) Oral daily  atorvastatin 20 milliGRAM(s) Oral at bedtime  citalopram 20 milliGRAM(s) Oral daily  clopidogrel Tablet 75 milliGRAM(s) Oral daily  divalproex  milliGRAM(s) Oral <User Schedule>  fluticasone propionate/ salmeterol 100-50 MICROgram(s) Diskus 1 Dose(s) Inhalation two times a day  lactulose Syrup 10 Gram(s) Oral daily  OLANZapine 10 milliGRAM(s) Oral daily  tamsulosin 0.4 milliGRAM(s) Oral at bedtime    MEDICATIONS  (PRN):  acetaminophen     Tablet .. 650 milliGRAM(s) Oral every 6 hours PRN Mild Pain (1 - 3), Moderate Pain (4 - 6)  albuterol    90 MICROgram(s) HFA Inhaler 2 Puff(s) Inhalation every 6 hours PRN Shortness of Breath and/or Wheezing  melatonin 3 milliGRAM(s) Oral at bedtime PRN Insomnia    Vital Signs Last 24 Hrs  T(C): 36.7 (18 Oct 2024 16:37), Max: 36.7 (18 Oct 2024 16:37)  T(F): 98 (18 Oct 2024 16:37), Max: 98 (18 Oct 2024 16:37)  HR: 77 (18 Oct 2024 16:37) (77 - 97)  BP: 124/79 (18 Oct 2024 16:37) (114/62 - 145/85)  BP(mean): --  RR: 18 (18 Oct 2024 16:37) (18 - 20)  SpO2: 93% (18 Oct 2024 16:37) (93% - 99%)    Parameters below as of 18 Oct 2024 16:37  Patient On (Oxygen Delivery Method): room air        Daily     Daily     10-17-24 @ 07:01  -  10-18-24 @ 07:00  --------------------------------------------------------  IN: 0 mL / OUT: 1100 mL / NET: -1100 mL    10-18-24 @ 07:01  -  10-18-24 @ 19:19  --------------------------------------------------------  IN: 1058 mL / OUT: 0 mL / NET: 1058 mL      CAPILLARY BLOOD GLUCOSE        PHYSICAL EXAM:      T(C): 36.7 (10-18-24 @ 16:37), Max: 36.7 (10-18-24 @ 16:37)  HR: 77 (10-18-24 @ 16:37) (77 - 97)  BP: 124/79 (10-18-24 @ 16:37) (114/62 - 145/85)  RR: 18 (10-18-24 @ 16:37) (18 - 20)  SpO2: 93% (10-18-24 @ 16:37) (93% - 99%)  Wt(kg): --  Lungs clear  Heart S1S2  Abd soft NT ND  Extremities:   tr edema              10-18    132[L]  |  94[L]  |  9   ----------------------------<  90  3.9   |  30  |  0.49[L]    Ca    8.9      18 Oct 2024 08:10  Phos  4.4     10-18  Mg     1.5     10-18    TPro  6.0  /  Alb  2.9[L]  /  TBili  0.4  /  DBili  x   /  AST  18  /  ALT  23  /  AlkPhos  73  10-18                          11.7   8.08  )-----------( 188      ( 18 Oct 2024 08:10 )             34.0     Creatinine Trend: 0.49<--, 0.46<--, 0.78<--, 0.47<--, 0.46<--  Urinalysis Basic - ( 18 Oct 2024 08:10 )    Color: x / Appearance: x / SG: x / pH: x  Gluc: 90 mg/dL / Ketone: x  / Bili: x / Urobili: x   Blood: x / Protein: x / Nitrite: x   Leuk Esterase: x / RBC: x / WBC x   Sq Epi: x / Non Sq Epi: x / Bacteria: x            Assessment   Hypovolemic hyponatremia   Improved     Plan  For discharge today  Follow Na trend as outpatient     Oracio Saunders MD

## 2024-10-18 NOTE — DISCHARGE NOTE PROVIDER - CARE PROVIDER_API CALL
Doreen Cam  Internal Medicine  [address] [place] [address]  Phone: [phone]  Fax: [phone]  Follow Up Time:

## 2024-10-18 NOTE — DISCHARGE NOTE PROVIDER - NSDCHOSPICE_GEN_A_CORE
When Should The Patient Follow-Up For Their Next Full-Body Skin Exam?: 3 Months Quality 137: Melanoma: Continuity Of Care - Recall System: Patient information entered into a recall system that includes: target date for the next exam specified AND a process to follow up with patients regarding missed or unscheduled appointments Detail Level: Detailed No

## 2024-10-18 NOTE — DISCHARGE NOTE PROVIDER - HOSPITAL COURSE
78 years old male with h/o HTN, HLD, COPD, schizophrenia, mild intellectual disability was brought in from group home for AMS. At baseline, patient is ambulatory, able to engage in some conversation. Patient was noted to be altered, unresponsive while having breakfast at residence . at arrival in er 58.34 80,20 cxr no acute process no change from previous , ct head  Parenchymal and loss and chronic microvessel infarcts. No acute hemorrhage mass or mass effect seen no change from previous ct head. wbc 5.4 bun/cr 11/0.8 sodium 128  patient received fluids and his mental status improved .Patient received 3 liter bolus and maintenance fluids , pt was on carddizem 120 mf at  home it was held initially and will be continued at lower dose of 60 mg day AM labs revealed sodium increased to 132 .Patient was evaluated by the hospitalist and is stable for return to group home setting .

## 2024-10-18 NOTE — DISCHARGE NOTE NURSING/CASE MANAGEMENT/SOCIAL WORK - FINANCIAL ASSISTANCE
[facility] provides services at a reduced cost to those who are determined to be eligible through [facility]’s financial assistance program. Information regarding [facility]’s financial assistance program can be found by going to [email or web address] or by calling [phone].

## 2024-10-18 NOTE — DIETITIAN INITIAL EVALUATION ADULT - ADD RECOMMEND
Monitor and replete electrolytes as needed   Continue to provide assistance and encouragement with PO intake

## 2024-10-18 NOTE — DISCHARGE NOTE NURSING/CASE MANAGEMENT/SOCIAL WORK - NSDCVIVACCINE_GEN_ALL_CORE_FT
Tdap; 15-Jul-2023 23:06; Bessy Whitaker (RN); [employer]; [id number] (Exp. Date: 19-Sep-2025); IntraMuscular; Deltoid Left.; 0.5 milliLiter(s); VIS (VIS Published: 09-May-2013, VIS Presented: 15-Jul-2023);

## 2024-10-18 NOTE — DIETITIAN INITIAL EVALUATION ADULT - OTHER INFO
Pt with intellectual disability from group home, asleep with aide at bedside at time of visit. Aide reports pt on no therapeutic diet restrictions PTA; has his food cut-up and drinks Ensure Plus High Protein x 2/day (provides 700 kcal, 40 g protein) at group home.   Reports pt with good appetite and PO intake during LOS. Denies pt with difficulty chewing/swallowing. States pt weight stable.   Amenable to pt receiving Ensure Plus High Protein x 2/day (provides 700 kcal, 40 g protein); preferences taken and relayed to diet office.  Made aware RD remains available.

## 2024-10-23 NOTE — ED ADULT NURSE NOTE - NSFALLHARMRISKINTERV_ED_ALL_ED

## 2024-10-24 DIAGNOSIS — E86.1 HYPOVOLEMIA: ICD-10-CM

## 2024-10-24 DIAGNOSIS — J44.9 CHRONIC OBSTRUCTIVE PULMONARY DISEASE, UNSPECIFIED: ICD-10-CM

## 2024-10-24 DIAGNOSIS — Z79.51 LONG TERM (CURRENT) USE OF INHALED STEROIDS: ICD-10-CM

## 2024-10-24 DIAGNOSIS — I95.9 HYPOTENSION, UNSPECIFIED: ICD-10-CM

## 2024-10-24 DIAGNOSIS — Z86.73 PERSONAL HISTORY OF TRANSIENT ISCHEMIC ATTACK (TIA), AND CEREBRAL INFARCTION WITHOUT RESIDUAL DEFICITS: ICD-10-CM

## 2024-10-24 DIAGNOSIS — T68.XXXA HYPOTHERMIA, INITIAL ENCOUNTER: ICD-10-CM

## 2024-10-24 DIAGNOSIS — I10 ESSENTIAL (PRIMARY) HYPERTENSION: ICD-10-CM

## 2024-10-24 DIAGNOSIS — Z79.82 LONG TERM (CURRENT) USE OF ASPIRIN: ICD-10-CM

## 2024-10-24 DIAGNOSIS — E87.1 HYPO-OSMOLALITY AND HYPONATREMIA: ICD-10-CM

## 2024-10-24 DIAGNOSIS — F20.9 SCHIZOPHRENIA, UNSPECIFIED: ICD-10-CM

## 2024-10-24 DIAGNOSIS — F78.A9 OTHER GENETIC RELATED INTELLECTUAL DISABILITY: ICD-10-CM

## 2024-10-24 DIAGNOSIS — R41.82 ALTERED MENTAL STATUS, UNSPECIFIED: ICD-10-CM

## 2024-10-24 DIAGNOSIS — E78.5 HYPERLIPIDEMIA, UNSPECIFIED: ICD-10-CM

## 2024-10-24 DIAGNOSIS — Z79.02 LONG TERM (CURRENT) USE OF ANTITHROMBOTICS/ANTIPLATELETS: ICD-10-CM

## 2024-10-24 DIAGNOSIS — Z88.8 ALLERGY STATUS TO OTHER DRUGS, MEDICAMENTS AND BIOLOGICAL SUBSTANCES: ICD-10-CM

## 2024-10-24 DIAGNOSIS — Z91.041 RADIOGRAPHIC DYE ALLERGY STATUS: ICD-10-CM

## 2024-10-24 DIAGNOSIS — E83.42 HYPOMAGNESEMIA: ICD-10-CM

## 2024-11-21 ENCOUNTER — INPATIENT (INPATIENT)
Facility: HOSPITAL | Age: 79
LOS: 4 days | Discharge: HOME | End: 2024-11-26
Attending: INTERNAL MEDICINE | Admitting: INTERNAL MEDICINE
Payer: MEDICARE

## 2024-11-21 VITALS
OXYGEN SATURATION: 94 % | RESPIRATION RATE: 19 BRPM | HEART RATE: 64 BPM | SYSTOLIC BLOOD PRESSURE: 126 MMHG | DIASTOLIC BLOOD PRESSURE: 76 MMHG

## 2024-11-21 DIAGNOSIS — I10 ESSENTIAL (PRIMARY) HYPERTENSION: ICD-10-CM

## 2024-11-21 DIAGNOSIS — J44.9 CHRONIC OBSTRUCTIVE PULMONARY DISEASE, UNSPECIFIED: ICD-10-CM

## 2024-11-21 DIAGNOSIS — E78.5 HYPERLIPIDEMIA, UNSPECIFIED: ICD-10-CM

## 2024-11-21 DIAGNOSIS — R93.89 ABNORMAL FINDINGS ON DIAGNOSTIC IMAGING OF OTHER SPECIFIED BODY STRUCTURES: ICD-10-CM

## 2024-11-21 DIAGNOSIS — Z29.9 ENCOUNTER FOR PROPHYLACTIC MEASURES, UNSPECIFIED: ICD-10-CM

## 2024-11-21 DIAGNOSIS — F20.9 SCHIZOPHRENIA, UNSPECIFIED: ICD-10-CM

## 2024-11-21 DIAGNOSIS — E87.1 HYPO-OSMOLALITY AND HYPONATREMIA: ICD-10-CM

## 2024-11-21 LAB
ALBUMIN SERPL ELPH-MCNC: 3.1 G/DL — LOW (ref 3.3–5)
ALP SERPL-CCNC: 78 U/L — SIGNIFICANT CHANGE UP (ref 40–120)
ALT FLD-CCNC: 24 U/L — SIGNIFICANT CHANGE UP (ref 12–78)
AMPHET UR-MCNC: NEGATIVE — SIGNIFICANT CHANGE UP
ANION GAP SERPL CALC-SCNC: 7 MMOL/L — SIGNIFICANT CHANGE UP (ref 5–17)
APPEARANCE UR: CLEAR — SIGNIFICANT CHANGE UP
APTT BLD: 47.1 SEC — HIGH (ref 24.5–35.6)
AST SERPL-CCNC: 22 U/L — SIGNIFICANT CHANGE UP (ref 15–37)
BARBITURATES UR SCN-MCNC: NEGATIVE — SIGNIFICANT CHANGE UP
BASE EXCESS BLDV CALC-SCNC: 3.2 MMOL/L — HIGH (ref -2–3)
BASOPHILS # BLD AUTO: 0.01 K/UL — SIGNIFICANT CHANGE UP (ref 0–0.2)
BASOPHILS NFR BLD AUTO: 0.2 % — SIGNIFICANT CHANGE UP (ref 0–2)
BENZODIAZ UR-MCNC: NEGATIVE — SIGNIFICANT CHANGE UP
BILIRUB SERPL-MCNC: 0.5 MG/DL — SIGNIFICANT CHANGE UP (ref 0.2–1.2)
BILIRUB UR-MCNC: NEGATIVE — SIGNIFICANT CHANGE UP
BLOOD GAS COMMENTS, VENOUS: SIGNIFICANT CHANGE UP
BUN SERPL-MCNC: 9 MG/DL — SIGNIFICANT CHANGE UP (ref 7–23)
CALCIUM SERPL-MCNC: 8.5 MG/DL — SIGNIFICANT CHANGE UP (ref 8.5–10.1)
CHLORIDE SERPL-SCNC: 85 MMOL/L — LOW (ref 96–108)
CO2 BLDV-SCNC: 34 MMOL/L — HIGH (ref 22–26)
CO2 SERPL-SCNC: 32 MMOL/L — HIGH (ref 22–31)
COCAINE METAB.OTHER UR-MCNC: NEGATIVE — SIGNIFICANT CHANGE UP
COLOR SPEC: YELLOW — SIGNIFICANT CHANGE UP
CREAT SERPL-MCNC: 0.46 MG/DL — LOW (ref 0.5–1.3)
DIFF PNL FLD: NEGATIVE — SIGNIFICANT CHANGE UP
EGFR: 107 ML/MIN/1.73M2 — SIGNIFICANT CHANGE UP
EOSINOPHIL # BLD AUTO: 0.13 K/UL — SIGNIFICANT CHANGE UP (ref 0–0.5)
EOSINOPHIL NFR BLD AUTO: 2.1 % — SIGNIFICANT CHANGE UP (ref 0–6)
ETHANOL SERPL-MCNC: <10 MG/DL — SIGNIFICANT CHANGE UP (ref 0–10)
GAS PNL BLDV: SIGNIFICANT CHANGE UP
GLUCOSE SERPL-MCNC: 112 MG/DL — HIGH (ref 70–99)
GLUCOSE UR QL: NEGATIVE MG/DL — SIGNIFICANT CHANGE UP
HCO3 BLDV-SCNC: 32 MMOL/L — HIGH (ref 22–28)
HCT VFR BLD CALC: 34 % — LOW (ref 39–50)
HGB BLD-MCNC: 12.4 G/DL — LOW (ref 13–17)
HOROWITZ INDEX BLDV+IHG-RTO: 21 — SIGNIFICANT CHANGE UP
IMM GRANULOCYTES NFR BLD AUTO: 0.8 % — SIGNIFICANT CHANGE UP (ref 0–0.9)
INR BLD: 1.31 RATIO — HIGH (ref 0.85–1.16)
KETONES UR-MCNC: NEGATIVE MG/DL — SIGNIFICANT CHANGE UP
LACTATE SERPL-SCNC: 1.1 MMOL/L — SIGNIFICANT CHANGE UP (ref 0.7–2)
LEUKOCYTE ESTERASE UR-ACNC: NEGATIVE — SIGNIFICANT CHANGE UP
LYMPHOCYTES # BLD AUTO: 1.63 K/UL — SIGNIFICANT CHANGE UP (ref 1–3.3)
LYMPHOCYTES # BLD AUTO: 26 % — SIGNIFICANT CHANGE UP (ref 13–44)
MCHC RBC-ENTMCNC: 32.6 PG — SIGNIFICANT CHANGE UP (ref 27–34)
MCHC RBC-ENTMCNC: 36.5 G/DL — HIGH (ref 32–36)
MCV RBC AUTO: 89.5 FL — SIGNIFICANT CHANGE UP (ref 80–100)
METHADONE UR-MCNC: NEGATIVE — SIGNIFICANT CHANGE UP
MONOCYTES # BLD AUTO: 0.96 K/UL — HIGH (ref 0–0.9)
MONOCYTES NFR BLD AUTO: 15.3 % — HIGH (ref 2–14)
NEUTROPHILS # BLD AUTO: 3.5 K/UL — SIGNIFICANT CHANGE UP (ref 1.8–7.4)
NEUTROPHILS NFR BLD AUTO: 55.6 % — SIGNIFICANT CHANGE UP (ref 43–77)
NITRITE UR-MCNC: NEGATIVE — SIGNIFICANT CHANGE UP
NRBC # BLD: 0 /100 WBCS — SIGNIFICANT CHANGE UP (ref 0–0)
OPIATES UR-MCNC: NEGATIVE — SIGNIFICANT CHANGE UP
PCO2 BLDV: 68 MMHG — HIGH (ref 42–55)
PCP SPEC-MCNC: SIGNIFICANT CHANGE UP
PCP UR-MCNC: NEGATIVE — SIGNIFICANT CHANGE UP
PH BLDV: 7.28 — LOW (ref 7.32–7.43)
PH UR: 7.5 — SIGNIFICANT CHANGE UP (ref 5–8)
PLATELET # BLD AUTO: 193 K/UL — SIGNIFICANT CHANGE UP (ref 150–400)
PO2 BLDV: 29 MMHG — SIGNIFICANT CHANGE UP (ref 25–45)
POTASSIUM SERPL-MCNC: 3.5 MMOL/L — SIGNIFICANT CHANGE UP (ref 3.5–5.3)
POTASSIUM SERPL-SCNC: 3.5 MMOL/L — SIGNIFICANT CHANGE UP (ref 3.5–5.3)
PROT SERPL-MCNC: 6.4 GM/DL — SIGNIFICANT CHANGE UP (ref 6–8.3)
PROT UR-MCNC: NEGATIVE MG/DL — SIGNIFICANT CHANGE UP
PROTHROM AB SERPL-ACNC: 15.2 SEC — HIGH (ref 9.9–13.4)
RAPID RVP RESULT: SIGNIFICANT CHANGE UP
RBC # BLD: 3.8 M/UL — LOW (ref 4.2–5.8)
RBC # FLD: 12.8 % — SIGNIFICANT CHANGE UP (ref 10.3–14.5)
SAO2 % BLDV: 44.8 % — LOW (ref 94–98)
SARS-COV-2 RNA SPEC QL NAA+PROBE: SIGNIFICANT CHANGE UP
SODIUM SERPL-SCNC: 124 MMOL/L — LOW (ref 135–145)
SP GR SPEC: >1.03 — HIGH (ref 1–1.03)
THC UR QL: NEGATIVE — SIGNIFICANT CHANGE UP
TROPONIN I, HIGH SENSITIVITY RESULT: 5.4 NG/L — SIGNIFICANT CHANGE UP
UROBILINOGEN FLD QL: 1 MG/DL — SIGNIFICANT CHANGE UP (ref 0.2–1)
WBC # BLD: 6.28 K/UL — SIGNIFICANT CHANGE UP (ref 3.8–10.5)
WBC # FLD AUTO: 6.28 K/UL — SIGNIFICANT CHANGE UP (ref 3.8–10.5)

## 2024-11-21 PROCEDURE — 99285 EMERGENCY DEPT VISIT HI MDM: CPT

## 2024-11-21 PROCEDURE — 70496 CT ANGIOGRAPHY HEAD: CPT | Mod: 26,MC

## 2024-11-21 PROCEDURE — 71045 X-RAY EXAM CHEST 1 VIEW: CPT | Mod: 26

## 2024-11-21 PROCEDURE — 71250 CT THORAX DX C-: CPT | Mod: 26

## 2024-11-21 PROCEDURE — 0042T: CPT | Mod: MC

## 2024-11-21 PROCEDURE — 70450 CT HEAD/BRAIN W/O DYE: CPT | Mod: 26,MC,XU

## 2024-11-21 PROCEDURE — 70498 CT ANGIOGRAPHY NECK: CPT | Mod: 26,MC

## 2024-11-21 PROCEDURE — 99222 1ST HOSP IP/OBS MODERATE 55: CPT

## 2024-11-21 PROCEDURE — 93010 ELECTROCARDIOGRAM REPORT: CPT

## 2024-11-21 RX ORDER — SENNOSIDES 8.6 MG
2 TABLET ORAL AT BEDTIME
Refills: 0 | Status: DISCONTINUED | OUTPATIENT
Start: 2024-11-21 | End: 2024-11-26

## 2024-11-21 RX ORDER — DIVALPROEX SODIUM 500 MG
500 TABLET, DELAYED RELEASE (ENTERIC COATED) ORAL
Refills: 0 | Status: DISCONTINUED | OUTPATIENT
Start: 2024-11-21 | End: 2024-11-26

## 2024-11-21 RX ORDER — ACETAMINOPHEN, DIPHENHYDRAMINE HCL, PHENYLEPHRINE HCL 325; 25; 5 MG/1; MG/1; MG/1
3 TABLET ORAL AT BEDTIME
Refills: 0 | Status: DISCONTINUED | OUTPATIENT
Start: 2024-11-21 | End: 2024-11-26

## 2024-11-21 RX ORDER — ALBUTEROL 90 MCG
2 AEROSOL (GRAM) INHALATION EVERY 6 HOURS
Refills: 0 | Status: DISCONTINUED | OUTPATIENT
Start: 2024-11-21 | End: 2024-11-26

## 2024-11-21 RX ORDER — OLANZAPINE 20 MG/1
10 TABLET ORAL DAILY
Refills: 0 | Status: DISCONTINUED | OUTPATIENT
Start: 2024-11-21 | End: 2024-11-26

## 2024-11-21 RX ORDER — FLUTICASONE PROPIONATE AND SALMETEROL XINAFOATE 45; 21 UG/1; UG/1
1 AEROSOL, METERED RESPIRATORY (INHALATION)
Refills: 0 | Status: DISCONTINUED | OUTPATIENT
Start: 2024-11-21 | End: 2024-11-26

## 2024-11-21 RX ORDER — TAMSULOSIN HYDROCHLORIDE 0.4 MG/1
0.4 CAPSULE ORAL AT BEDTIME
Refills: 0 | Status: DISCONTINUED | OUTPATIENT
Start: 2024-11-21 | End: 2024-11-26

## 2024-11-21 RX ORDER — CLOPIDOGREL 75 MG/1
75 TABLET, FILM COATED ORAL DAILY
Refills: 0 | Status: DISCONTINUED | OUTPATIENT
Start: 2024-11-21 | End: 2024-11-26

## 2024-11-21 RX ORDER — SODIUM CHLORIDE 9 MG/ML
500 INJECTION, SOLUTION INTRAMUSCULAR; INTRAVENOUS; SUBCUTANEOUS ONCE
Refills: 0 | Status: COMPLETED | OUTPATIENT
Start: 2024-11-21 | End: 2024-11-21

## 2024-11-21 RX ORDER — LACTULOSE 10 G/15ML
10 SOLUTION ORAL DAILY
Refills: 0 | Status: DISCONTINUED | OUTPATIENT
Start: 2024-11-21 | End: 2024-11-26

## 2024-11-21 RX ORDER — MAGNESIUM, ALUMINUM HYDROXIDE 200-225/5
30 SUSPENSION, ORAL (FINAL DOSE FORM) ORAL EVERY 4 HOURS
Refills: 0 | Status: DISCONTINUED | OUTPATIENT
Start: 2024-11-21 | End: 2024-11-26

## 2024-11-21 RX ORDER — CITALOPRAM HYDROBROMIDE 20 MG
20 TABLET ORAL DAILY
Refills: 0 | Status: DISCONTINUED | OUTPATIENT
Start: 2024-11-21 | End: 2024-11-26

## 2024-11-21 RX ORDER — ACETAMINOPHEN 500MG 500 MG/1
650 TABLET, COATED ORAL EVERY 6 HOURS
Refills: 0 | Status: DISCONTINUED | OUTPATIENT
Start: 2024-11-21 | End: 2024-11-26

## 2024-11-21 RX ADMIN — SODIUM CHLORIDE 500 MILLILITER(S): 9 INJECTION, SOLUTION INTRAMUSCULAR; INTRAVENOUS; SUBCUTANEOUS at 18:26

## 2024-11-21 NOTE — H&P ADULT - PROBLEM SELECTOR PLAN 3
- CT head w/ Emphysematous change with multifocal scarring and/or atelectasis visualized right lung as well as partially imaged right greater than left peribronchial thickening; a nonspecific finding which may reflect acute or chronic infectious/inflammatory bronchitis.  - F/u RVP  - F/u CT chest

## 2024-11-21 NOTE — H&P ADULT - PROBLEM SELECTOR PLAN 1
- P/w slurred speech and R facial droop, that is no longer present  - CT head = No acute pathologies.  - CTA head&neck = No proximal large vessel occlusion.  - CTA perfusion = No predicted core infarct. No evidence of active ischemia-tissue at risk.  - EKG = NSR  - Dysphagia Screen passed  - Started Aspirin 81mg, Plavix, and Atorvastatin 20mg  - Admit to telemetry  - Fall precautions  - Neuro check q4hrs  - Permissive hypertension  - F/u A1c and lipid panel  - F/u Echo w/ bubble study  - F/u PT evaluation  - F/u MR brain

## 2024-11-21 NOTE — H&P ADULT - PROBLEM SELECTOR PLAN 4
ED Attending Addendum          Patient : Geovanna Matt Age: 75 year old Sex: female   MRN: [id number] Encounter Date: 6/11/2020      Addendum     • Patient was seen and evaluated conjunction with the resident physician.  I perform my own history and physical examination.  I reviewed the resident's note.  I agree with findings except as noted below.  I was available and supervised key portions of procedures.  See resident note for further details.  • 75-year-old female presents today complaining of near syncope.  Patient states she said 4-5 episodes that started today.  Episodes come on very suddenly, then go away quickly.  States it started shortly after eating some onion rings today.  States that she has had these episodes several times in the past, and has had significant work-up for this.  Generally she only gets 1-2 episodes every 4 to 5 months, however, today because she had a few episodes in a row she was more concerned and came in for evaluation.  She denies any chest pain.  She does complain of her gastritis acting up, which she does have a significant history of.  She denies passing out today, however, she has had 2 syncopal episodes in the past remotely.  She sees a cardiologist/EP specialist Dr. Cardoza.  She also has had increasing gastritis symptoms, takes omeprazole once daily, she made appointment with her gastroenterologist which is scheduled for Monday.  Patient denies fevers, chills.  No chest pain or shortness of breath.  No extremity pain, swelling.  No cough.  No other complaints.  Patient denies any symptoms of near syncope since being in the emergency department.  She does feel sniffily improved here.  • Patient did travel from [place] recently, she flew, she did get up multiple times in the flight    EXAM: On exam patient resting comfortably.  Pupils equal, and reactive.  Neck is supple.  Heart is regular.  No murmur.  Lungs are clear to auscultation.  Abdomen soft, nondistended, mild epigastric  tenderness palpation no rebound or guarding.  Extremities atraumatic, no swelling.  Patient is awake, alert, and oriented ×3.  Cranial nerves II through XII intact.  Strength 5/5 bilateral upper and lower extremities.  Sensation grossly intact throughout.  No pronator drift.  Cerebellar testing intact, normal heel to shin, finger to nose testing.    EKG: EKG shows sinus rhythm rate of 70.  Parable 198.  QTc 405.  QRS 89.  No STEMI.    COURSE: Patient presents today with above complaint.  Overall nontoxic-appearing.  EKG shows no evidence of ischemic changes.  Patient has no evidence of arrhythmia here.  She was observed on the monitor throughout her ER course without any evidence of arrhythmia.  Work-up is unrevealing.  She has mild hypokalemia.  Will give GI cocktail for her gastritis symptoms.  Patient will call Dr. Cardoza tomorrow, she is comfortable this plan.  She ambulates here with steady gait, no further symptoms.  Red flags for return discussed at length, patient expressed understanding.                Clinical Impression     ED Diagnosis   1. Syncope and collapse           Disposition        There is no disposition no dispo time  There is no comment      Brandon Anna MD   6/11/2020 8:58 PM           Brandon Anna MD  06/11/20 2058     - C/w home inhalers  - On room air saturating 98%  - Not wheezing - C/w home inhalers  - On room air saturating 98%  - Not wheezing  - CXR = Clear lungs with no acute abnormalities.

## 2024-11-21 NOTE — ED PROVIDER NOTE - CLINICAL SUMMARY MEDICAL DECISION MAKING FREE TEXT BOX
79F PMH hypertension, schizophrenia / bipolar BIBEMS d/t slurred speech, R facial droop. Last known well 11A per  staff. Afebrile, VSS. . Plan for Code Stroke. Re-eval. 79F PMH hypertension, schizophrenia / bipolar BIBEMS d/t slurred speech, R facial droop. Last known well 11A per  staff. Afebrile, VSS. . NIH 3 d/t R facial asymmetry, dysarthria. Plan for Code Stroke. Re-eval.   CT plain w/o ICH. 79F PMH HTN, HLD, COPD, schizophrenia / bipolar, mild intellectual disability BIBEMS d/t slurred speech, R facial droop. Last known well 11A per  staff. Afebrile, VSS. . Pt somnolent but responsive to sternal rub, pt following commands appropriately. NIH 4 d/t somnolence, R facial asymmetry, dysarthria.  Plan for Code Stroke. Re-eval.   CT plain w/o ICH. Chart review shows pt w/ hx 2 similar presentations to ED in October 2024. Consider less likely CVA and more likely metabolic / infectious / polypharmacy. Will not give TNK. 79F PMH HTN, HLD, COPD, schizophrenia / bipolar, mild intellectual disability BIBEMS d/t slurred speech, R facial droop. Last known well 11A per  staff. Afebrile, VSS. . Pt somnolent but responsive to sternal rub, pt following commands appropriately. NIH 4 d/t somnolence, R facial asymmetry, dysarthria.  Plan for Code Stroke. Re-eval.   CT plain w/o ICH, CTA w/o LVO, CTP w/o mismatch .Chart review shows pt w/ hx 2 similar presentations to ED in October 2024. Consider less likely CVA and more likely metabolic / infectious / polypharmacy. Will not give TNK.   W/u significant for: Na+ 124, Cl- 85, VBG pH 7.28, CO2 68. Lactate WNL.   On re-eval, in NAD. Pt on 5L NC in the ED.   Will admit to Tele (d/w Dr Driver), Neuro (Dr Leone) consulted.

## 2024-11-21 NOTE — ED ADULT NURSE NOTE - BEFAST SPEECH
No abnormalities noted
Yes
No abnormalities noted

## 2024-11-21 NOTE — H&P ADULT - ASSESSMENT
Patient is a 79M, with PMHx of HTN, HLD, COPD, schizophrenia, mild intellectual disability, who was brought in from group home for slurred speech and R facial droop. Admitted for TIA. Patient is a 79M, with PMHx of HTN, HLD, COPD, schizophrenia, mild intellectual disability, who was brought in from group home for slurred speech and R facial droop. Admitted for TIA.    *Group home papers did not include medication list. Will continue meds based on Oct 18th, 2024 discharge. Please confirm home meds

## 2024-11-21 NOTE — ED PROVIDER NOTE - PR
[No Acute Distress] : no acute distress [Well Nourished] : well nourished [Well Developed] : well developed [Well-Appearing] : well-appearing [Normal Sclera/Conjunctiva] : normal sclera/conjunctiva [PERRL] : pupils equal round and reactive to light [EOMI] : extraocular movements intact [Normal Outer Ear/Nose] : the outer ears and nose were normal in appearance [Normal Oropharynx] : the oropharynx was normal [No JVD] : no jugular venous distention [No Lymphadenopathy] : no lymphadenopathy [Supple] : supple [Thyroid Normal, No Nodules] : the thyroid was normal and there were no nodules present [No Respiratory Distress] : no respiratory distress  [No Accessory Muscle Use] : no accessory muscle use [Clear to Auscultation] : lungs were clear to auscultation bilaterally [Normal Rate] : normal rate  [Regular Rhythm] : with a regular rhythm [Normal S1, S2] : normal S1 and S2 [No Murmur] : no murmur heard [No Carotid Bruits] : no carotid bruits [No Abdominal Bruit] : a ~M bruit was not heard ~T in the abdomen [No Varicosities] : no varicosities [Pedal Pulses Present] : the pedal pulses are present [No Edema] : there was no peripheral edema [No Palpable Aorta] : no palpable aorta 166 [No Extremity Clubbing/Cyanosis] : no extremity clubbing/cyanosis [Soft] : abdomen soft [Non Tender] : non-tender [Non-distended] : non-distended [No Masses] : no abdominal mass palpated [No HSM] : no HSM [Normal Bowel Sounds] : normal bowel sounds [Normal Posterior Cervical Nodes] : no posterior cervical lymphadenopathy [Normal Anterior Cervical Nodes] : no anterior cervical lymphadenopathy [No CVA Tenderness] : no CVA  tenderness [No Spinal Tenderness] : no spinal tenderness [No Joint Swelling] : no joint swelling [Grossly Normal Strength/Tone] : grossly normal strength/tone [No Rash] : no rash [Coordination Grossly Intact] : coordination grossly intact [No Focal Deficits] : no focal deficits [Normal Gait] : normal gait [Deep Tendon Reflexes (DTR)] : deep tendon reflexes were 2+ and symmetric [Normal Affect] : the affect was normal [Alert and Oriented x3] : oriented to person, place, and time [Normal Mood] : the mood was normal [de-identified] : left breast slight swelling ,johana dry skin

## 2024-11-21 NOTE — H&P ADULT - HISTORY OF PRESENT ILLNESS
Patient is a 79M, with PMHx of HTN, HLD, COPD, schizophrenia, mild intellectual disability, who was brought in from group home as code stroke. "Per EMS, initial call was for hypotension. Pt normotensive on their arrival, but w/ R facial droop and slurred speech."  Patient currently denies any pain or other symptoms. He just says he needs to urinate. Patient denies headache, fever, chills, nausea, vomit, chest pain, shortness of breath, cough, lightheadedness, abdominal pain, diarrhea, constipation, dark/bloody stool, dysuria, hematuria.   Group home staff at bedside just arrived and he says patient is at his baseline mental status, and has baseline speech.

## 2024-11-21 NOTE — ED ADULT NURSE NOTE - NSFALLRISKINTERV_ED_ALL_ED
Assistance OOB with selected safe patient handling equipment if applicable/Assistance with ambulation/Communicate fall risk and risk factors to all staff, patient, and family/Monitor gait and stability/Provide visual cue: yellow wristband, yellow gown, etc/Reinforce activity limits and safety measures with patient and family/Call bell, personal items and telephone in reach/Instruct patient to call for assistance before getting out of bed/chair/stretcher/Non-slip footwear applied when patient is off stretcher/[place] to call system/Physically safe environment - no spills, clutter or unnecessary equipment/Purposeful Proactive Rounding/Room/bathroom lighting operational, light cord in reach

## 2024-11-21 NOTE — ED PROVIDER NOTE - PHYSICAL EXAMINATION
GEN: Awake, alert, interactive, NAD.  HEAD AND NECK: NC/AT. Airway patent. Neck supple.   EYES:  Clear b/l. EOMI. PERRL.   ENT: Moist mucus membranes. Edentulous.   CARDIAC: Regular rate, regular rhythm. No evident pedal edema.    RESP/CHEST: Normal respiratory effort with no use of accessory muscles or retractions. Clear throughout on auscultation.  ABD: Soft, non-distended, non-tender. No rebound, no guarding.   BACK: No midline spinal TTP. No CVAT.   EXTREMITIES: Moving all extremities with no apparent deformities.   SKIN: Warm, dry, intact normal color. No rash.   NEURO: AOx3, NIH   PSYCH: Appropriate mood and affect. GEN: Awake, alert, interactive, NAD.  HEAD AND NECK: NC/AT. Airway patent. Neck supple.   EYES:  Clear b/l. EOMI. PERRL.   ENT: Moist mucus membranes. Edentulous.   CARDIAC: Regular rate, regular rhythm. No evident pedal edema.    RESP/CHEST: Normal respiratory effort with no use of accessory muscles or retractions. Clear throughout on auscultation.  ABD: Soft, non-distended, non-tender. No rebound, no guarding.   BACK: No midline spinal TTP. No CVAT.   EXTREMITIES: Moving all extremities with no apparent deformities.   SKIN: Warm, dry, intact normal color. No rash.   NEURO: AOx3, NIH 3 d/t R facial asymmetry and dysarthria.   PSYCH: Appropriate mood and affect. GEN: Awake, alert, interactive, NAD.  HEAD AND NECK: NC/AT. Airway patent. Neck supple.   EYES:  Clear b/l. EOMI. PERRL.   ENT: Moist mucus membranes. Edentulous.   CARDIAC: Regular rate, regular rhythm. No evident pedal edema.    RESP/CHEST: Normal respiratory effort with no use of accessory muscles or retractions. Clear throughout on auscultation.  ABD: Soft, non-distended, non-tender. No rebound, no guarding.   BACK: No midline spinal TTP. No CVAT.   EXTREMITIES: Moving all extremities with no apparent deformities.   SKIN: Warm, dry, intact normal color. No rash.   NEURO: AOx3, NIH 4 d/t somnolence, R facial asymmetry and dysarthria.   PSYCH: Appropriate mood and affect.

## 2024-11-21 NOTE — ED ADULT TRIAGE NOTE - CHIEF COMPLAINT QUOTE
Patient BIBA presents to ED from group home with slurred speech, change in mental status and general weakness LNW 11am this morning. . EMS inserted 20 G to Left  AC. CODE STROKE ACTIVATED   hx HTN. schizophrenic, biploar

## 2024-11-21 NOTE — ED ADULT NURSE NOTE - OBJECTIVE STATEMENT
79 yr old male AOx3. C/o stroke-like symptoms as per EMS. BIBA from Group Home. As per EMS, group home states last known normal was 11 am. Pt AOx3 at baseline with clear speech. pt presents to ED with slurred speech and possible r sided facial droop. Pt appears lethargic, answering questions appropriately but slow to respond. Pt following commands. PMH of schizophrenia, bipolar, and HTN. Pt denies any pain. No signs of resp distress. Code stroke activated in ED. pt taken to CT

## 2024-11-21 NOTE — H&P ADULT - NSHPPHYSICALEXAM_GEN_ALL_CORE
GENERAL: NAD  HEAD: Atraumatic, Normocephalic  EYES: EOMI. Conjunctiva and sclera clear  NECK: Supple  CHEST/LUNG: Clear to auscultation bilaterally; No wheeze, rhonchi, rales  HEART: Regular rate and rhythm; No murmurs  ABDOMEN: Soft, Nontender, Nondistended; Bowel sounds present  EXTREMITIES: No edema  NEURO: AAOx3, Strengths of UEs and LEs equal bilaterally. No facial droop. Speech is at baseline per witness at bedside  SKIN: No rashes or lesions

## 2024-11-21 NOTE — ED PROVIDER NOTE - OBJECTIVE STATEMENT
79M PMH HTN, bipolar / schizophrenia BIBEMS from Group Home as Code Stroke. Per EMS, initial call was for hypotension. Pt normotensive on their arrival, but w/ R facial droop and slurred speech. Per  staff, pt normally w/ clear speech, AAOx3. . 79M PMH HTN, HLD, COPD, bipolar / schizophrenia, mild intellectual disability BIBEMS from Group Home as Code Stroke. Per EMS, initial call was for hypotension. Pt normotensive on their arrival, but w/ R facial droop and slurred speech. Per  staff, pt normally w/ clear speech, AAOx3. .

## 2024-11-22 LAB
A1C WITH ESTIMATED AVERAGE GLUCOSE RESULT: 5.8 % — HIGH (ref 4–5.6)
ALBUMIN SERPL ELPH-MCNC: 2.9 G/DL — LOW (ref 3.3–5)
ALP SERPL-CCNC: 84 U/L — SIGNIFICANT CHANGE UP (ref 40–120)
ALT FLD-CCNC: 20 U/L — SIGNIFICANT CHANGE UP (ref 12–78)
ANION GAP SERPL CALC-SCNC: 6 MMOL/L — SIGNIFICANT CHANGE UP (ref 5–17)
AST SERPL-CCNC: 17 U/L — SIGNIFICANT CHANGE UP (ref 15–37)
BILIRUB SERPL-MCNC: 0.5 MG/DL — SIGNIFICANT CHANGE UP (ref 0.2–1.2)
BUN SERPL-MCNC: 9 MG/DL — SIGNIFICANT CHANGE UP (ref 7–23)
CALCIUM SERPL-MCNC: 9 MG/DL — SIGNIFICANT CHANGE UP (ref 8.5–10.1)
CHLORIDE SERPL-SCNC: 92 MMOL/L — LOW (ref 96–108)
CHOLEST SERPL-MCNC: 116 MG/DL — SIGNIFICANT CHANGE UP
CO2 SERPL-SCNC: 32 MMOL/L — HIGH (ref 22–31)
CREAT SERPL-MCNC: 0.58 MG/DL — SIGNIFICANT CHANGE UP (ref 0.5–1.3)
EGFR: 100 ML/MIN/1.73M2 — SIGNIFICANT CHANGE UP
ESTIMATED AVERAGE GLUCOSE: 120 MG/DL — HIGH (ref 68–114)
GLUCOSE SERPL-MCNC: 99 MG/DL — SIGNIFICANT CHANGE UP (ref 70–99)
HCT VFR BLD CALC: 35.9 % — LOW (ref 39–50)
HDLC SERPL-MCNC: 57 MG/DL — SIGNIFICANT CHANGE UP
HGB BLD-MCNC: 12.8 G/DL — LOW (ref 13–17)
LIPID PNL WITH DIRECT LDL SERPL: 47 MG/DL — SIGNIFICANT CHANGE UP
MCHC RBC-ENTMCNC: 32.2 PG — SIGNIFICANT CHANGE UP (ref 27–34)
MCHC RBC-ENTMCNC: 35.7 G/DL — SIGNIFICANT CHANGE UP (ref 32–36)
MCV RBC AUTO: 90.4 FL — SIGNIFICANT CHANGE UP (ref 80–100)
NON HDL CHOLESTEROL: 59 MG/DL — SIGNIFICANT CHANGE UP
NRBC # BLD: 0 /100 WBCS — SIGNIFICANT CHANGE UP (ref 0–0)
PLATELET # BLD AUTO: 203 K/UL — SIGNIFICANT CHANGE UP (ref 150–400)
POTASSIUM SERPL-MCNC: 3.9 MMOL/L — SIGNIFICANT CHANGE UP (ref 3.5–5.3)
POTASSIUM SERPL-SCNC: 3.9 MMOL/L — SIGNIFICANT CHANGE UP (ref 3.5–5.3)
PROT SERPL-MCNC: 6.3 GM/DL — SIGNIFICANT CHANGE UP (ref 6–8.3)
RBC # BLD: 3.97 M/UL — LOW (ref 4.2–5.8)
RBC # FLD: 13 % — SIGNIFICANT CHANGE UP (ref 10.3–14.5)
SODIUM SERPL-SCNC: 130 MMOL/L — LOW (ref 135–145)
TRIGL SERPL-MCNC: 55 MG/DL — SIGNIFICANT CHANGE UP
WBC # BLD: 11.67 K/UL — HIGH (ref 3.8–10.5)
WBC # FLD AUTO: 11.67 K/UL — HIGH (ref 3.8–10.5)

## 2024-11-22 PROCEDURE — 99232 SBSQ HOSP IP/OBS MODERATE 35: CPT

## 2024-11-22 RX ORDER — SODIUM CHLORIDE 9 MG/ML
500 INJECTION, SOLUTION INTRAMUSCULAR; INTRAVENOUS; SUBCUTANEOUS ONCE
Refills: 0 | Status: COMPLETED | OUTPATIENT
Start: 2024-11-22 | End: 2024-11-22

## 2024-11-22 RX ORDER — ENOXAPARIN SODIUM 30 MG/.3ML
40 INJECTION SUBCUTANEOUS EVERY 24 HOURS
Refills: 0 | Status: DISCONTINUED | OUTPATIENT
Start: 2024-11-22 | End: 2024-11-26

## 2024-11-22 RX ADMIN — FLUTICASONE PROPIONATE AND SALMETEROL XINAFOATE 1 DOSE(S): 45; 21 AEROSOL, METERED RESPIRATORY (INHALATION) at 08:13

## 2024-11-22 RX ADMIN — ENOXAPARIN SODIUM 40 MILLIGRAM(S): 30 INJECTION SUBCUTANEOUS at 16:52

## 2024-11-22 RX ADMIN — SODIUM CHLORIDE 500 MILLILITER(S): 9 INJECTION, SOLUTION INTRAMUSCULAR; INTRAVENOUS; SUBCUTANEOUS at 05:00

## 2024-11-22 RX ADMIN — Medication 81 MILLIGRAM(S): at 12:12

## 2024-11-22 RX ADMIN — Medication 500 MILLIGRAM(S): at 21:21

## 2024-11-22 RX ADMIN — CLOPIDOGREL 75 MILLIGRAM(S): 75 TABLET, FILM COATED ORAL at 12:12

## 2024-11-22 RX ADMIN — FLUTICASONE PROPIONATE AND SALMETEROL XINAFOATE 1 DOSE(S): 45; 21 AEROSOL, METERED RESPIRATORY (INHALATION) at 17:01

## 2024-11-22 RX ADMIN — Medication 20 MILLIGRAM(S): at 12:13

## 2024-11-22 RX ADMIN — LACTULOSE 10 GRAM(S): 10 SOLUTION ORAL at 12:13

## 2024-11-22 RX ADMIN — Medication 2 TABLET(S): at 21:21

## 2024-11-22 RX ADMIN — Medication 20 MILLIGRAM(S): at 21:21

## 2024-11-22 RX ADMIN — OLANZAPINE 10 MILLIGRAM(S): 20 TABLET ORAL at 12:13

## 2024-11-22 RX ADMIN — TAMSULOSIN HYDROCHLORIDE 0.4 MILLIGRAM(S): 0.4 CAPSULE ORAL at 21:21

## 2024-11-22 RX ADMIN — Medication 500 MILLIGRAM(S): at 09:40

## 2024-11-22 NOTE — ED ADULT NURSE REASSESSMENT NOTE - NS ED NURSE REASSESS COMMENT FT1
receiving rn unable to take report at this time. she is on break. per unit secretary. I call back after 5am. charge nurse notified
patient is awake, calm, smiles and laugh when you speak to him. patient is alert and oriented x 2, not complaining of any pain. stable at this time.

## 2024-11-22 NOTE — CONSULT NOTE ADULT - ASSESSMENT
AMS--> probable toxic metabolic encephalopathy  Pneumonia  Hyponatremia- improving  Schizophrenia  Possible stroke    - MRI brain to rule out stroke  - symptoms most likely due to encephalopathy  - limit sedating meds  - on aspirin and statin for possible stroke prevention  - EEG to rule out seizure  - discussed with aide at bedside  - please call with questions    Thank you for the consult.

## 2024-11-22 NOTE — PROGRESS NOTE ADULT - ASSESSMENT
Patient is a 79M, with PMHx of HTN, HLD, COPD, schizophrenia, mild intellectual disability, who was brought in from group home for slurred speech and R facial droop. Admitted for TIA.    *Group home papers did not include medication list. Will continue meds based on Oct 18th, 2024 discharge. Please confirm home meds

## 2024-11-22 NOTE — PATIENT PROFILE ADULT - HAS THE PATIENT USED TOBACCO IN THE PAST 30 DAYS?
Unable to assess due to patient's cognitive impairment
Primary   Labs- CBC, BMP, T&S   Pre op instructions discussed

## 2024-11-22 NOTE — CONSULT NOTE ADULT - SUBJECTIVE AND OBJECTIVE BOX
HPI: 79 year old man with hx of HTN, HLD, COPD, schizophrenia, and mild cognitive impairment admitted with stroke code. Patient noted to have right facial droop and slurred speech. He is from a group home and is usually alert, walks without assisted device, and oriented to self and place. Patient has been sleeping all day today and difficult to arouse. Patient got Celexa, Aspirin, and Zyprexa. CT head and CTA head/neck showed no acute process.     PMHx:  HTN, HLD, COPD, schizophrenia, and mild cognitive impairment   PSHx: none  PFHx: none  Social Hx: group home resident  Allergies: iodine, betadine  ROS: unable to obtain due to AMS  Medications: see EMR    Vitals: Temp 97.9F      RR  18      HR  83     BP  112/71  General: NAD  CV: regular rate and rhythm  Resp: no respiratory distress  Neck: supple  Neuro Exam: Somnolent. Opens eyes to sternal rub and yells at me to stop. States his name and able to recognize the aide at bedside (from group home). Follows simple commands. Mild dysarthria. No aphasia. VOR intact. No obvious facial droop. PERRL. BTT. Tongue is midline. Palate elevate symmetrically. Shoulder shrug is intact. Able to maintain gravity in both arms (? left sided weakness). Withdraws to pain in both legs equally and against gravity. Reflexes symmetric and toes down. Gait exam deferred. Sensory intact to touch.     CT head and labs reviewed

## 2024-11-22 NOTE — PROGRESS NOTE ADULT - SUBJECTIVE AND OBJECTIVE BOX
Patient is a 79y old  Male who presents with a chief complaint of TIA (21 Nov 2024 17:50)      INTERVAL HPI/OVERNIGHT EVENTS:  Pt was seen and examined, no acute events.      MEDICATIONS  (STANDING):  aspirin enteric coated 81 milliGRAM(s) Oral daily  atorvastatin 20 milliGRAM(s) Oral at bedtime  citalopram 20 milliGRAM(s) Oral daily  clopidogrel Tablet 75 milliGRAM(s) Oral daily  divalproex  milliGRAM(s) Oral <User Schedule>  fluticasone propionate/ salmeterol 100-50 MICROgram(s) Diskus 1 Dose(s) Inhalation two times a day  lactulose Syrup 10 Gram(s) Oral daily  OLANZapine 10 milliGRAM(s) Oral daily  senna 2 Tablet(s) Oral at bedtime  tamsulosin 0.4 milliGRAM(s) Oral at bedtime    MEDICATIONS  (PRN):  acetaminophen     Tablet .. 650 milliGRAM(s) Oral every 6 hours PRN Temp greater or equal to 38C (100.4F), Mild Pain (1 - 3)  albuterol    90 MICROgram(s) HFA Inhaler 2 Puff(s) Inhalation every 6 hours PRN Shortness of Breath and/or Wheezing  aluminum hydroxide/magnesium hydroxide/simethicone Suspension 30 milliLiter(s) Oral every 4 hours PRN Dyspepsia  melatonin 3 milliGRAM(s) Oral at bedtime PRN Insomnia      Allergies    iodine (Unknown)  betadine (Unknown)    Intolerances          Vital Signs Last 24 Hrs  T(C): 36.6 (22 Nov 2024 15:00), Max: 37.2 (21 Nov 2024 16:16)  T(F): 97.9 (22 Nov 2024 15:00), Max: 98.9 (21 Nov 2024 16:16)  HR: 83 (22 Nov 2024 15:00) (65 - 94)  BP: 112/71 (22 Nov 2024 15:00) (84/64 - 120/85)  BP(mean): 73 (22 Nov 2024 04:00) (73 - 73)  RR: 18 (22 Nov 2024 15:00) (18 - 26)  SpO2: 94% (22 Nov 2024 15:00) (94% - 98%)    Parameters below as of 22 Nov 2024 15:00  Patient On (Oxygen Delivery Method): nasal cannula  O2 Flow (L/min): 3      PHYSICAL EXAM:  GENERAL: NAD  HEAD:  Atraumatic  EYES: PERRLA  ENMT: Mouth moist   NECK: Supple  NERVOUS SYSTEM:  Awake, alert  CHEST/LUNG: Clear  HEART: RRR, S1, S2  ABDOMEN: Soft, non tender  EXTREMITIES: no edema BL LE   SKIN: No rash        LABS:                        12.8   11.67 )-----------( 203      ( 22 Nov 2024 07:45 )             35.9     11-22    130[L]  |  92[L]  |  9   ----------------------------<  99  3.9   |  32[H]  |  0.58    Ca    9.0      22 Nov 2024 07:45    TPro  6.3  /  Alb  2.9[L]  /  TBili  0.5  /  DBili  x   /  AST  17  /  ALT  20  /  AlkPhos  84  11-22    PT/INR - ( 21 Nov 2024 14:20 )   PT: 15.2 sec;   INR: 1.31 ratio         PTT - ( 21 Nov 2024 14:20 )  PTT:47.1 sec  Urinalysis Basic - ( 22 Nov 2024 07:45 )    Color: x / Appearance: x / SG: x / pH: x  Gluc: 99 mg/dL / Ketone: x  / Bili: x / Urobili: x   Blood: x / Protein: x / Nitrite: x   Leuk Esterase: x / RBC: x / WBC x   Sq Epi: x / Non Sq Epi: x / Bacteria: x      CAPILLARY BLOOD GLUCOSE          Urinalysis with Rflx Culture (collected 21 Nov 2024 18:30)      RADIOLOGY & ADDITIONAL TESTS:    Imaging Personally Reviewed:  [ ] YES  [ ] NO    Consultant(s) Notes Reviewed:  [ ] YES  [ ] NO    Care Discussed with Consultants/Other Providers [ ] YES  [ ] NO

## 2024-11-22 NOTE — PATIENT PROFILE ADULT - FALL HARM RISK - HARM RISK INTERVENTIONS

## 2024-11-23 PROCEDURE — 70551 MRI BRAIN STEM W/O DYE: CPT | Mod: 26

## 2024-11-23 PROCEDURE — 95816 EEG AWAKE AND DROWSY: CPT | Mod: 26

## 2024-11-23 PROCEDURE — 99233 SBSQ HOSP IP/OBS HIGH 50: CPT

## 2024-11-23 RX ORDER — AMOXICILLIN/POTASSIUM CLAV 250-125 MG
1 TABLET ORAL
Refills: 0 | Status: DISCONTINUED | OUTPATIENT
Start: 2024-11-23 | End: 2024-11-26

## 2024-11-23 RX ADMIN — Medication 2 TABLET(S): at 21:23

## 2024-11-23 RX ADMIN — ENOXAPARIN SODIUM 40 MILLIGRAM(S): 30 INJECTION SUBCUTANEOUS at 17:30

## 2024-11-23 RX ADMIN — FLUTICASONE PROPIONATE AND SALMETEROL XINAFOATE 1 DOSE(S): 45; 21 AEROSOL, METERED RESPIRATORY (INHALATION) at 05:16

## 2024-11-23 RX ADMIN — Medication 500 MILLIGRAM(S): at 21:24

## 2024-11-23 RX ADMIN — Medication 1 TABLET(S): at 17:30

## 2024-11-23 RX ADMIN — Medication 500 MILLIGRAM(S): at 08:32

## 2024-11-23 RX ADMIN — TAMSULOSIN HYDROCHLORIDE 0.4 MILLIGRAM(S): 0.4 CAPSULE ORAL at 21:24

## 2024-11-23 RX ADMIN — OLANZAPINE 10 MILLIGRAM(S): 20 TABLET ORAL at 11:24

## 2024-11-23 RX ADMIN — LACTULOSE 10 GRAM(S): 10 SOLUTION ORAL at 11:24

## 2024-11-23 RX ADMIN — FLUTICASONE PROPIONATE AND SALMETEROL XINAFOATE 1 DOSE(S): 45; 21 AEROSOL, METERED RESPIRATORY (INHALATION) at 17:30

## 2024-11-23 RX ADMIN — Medication 20 MILLIGRAM(S): at 21:24

## 2024-11-23 RX ADMIN — Medication 20 MILLIGRAM(S): at 11:23

## 2024-11-23 RX ADMIN — Medication 81 MILLIGRAM(S): at 11:23

## 2024-11-23 RX ADMIN — CLOPIDOGREL 75 MILLIGRAM(S): 75 TABLET, FILM COATED ORAL at 11:23

## 2024-11-23 NOTE — PROGRESS NOTE ADULT - SUBJECTIVE AND OBJECTIVE BOX
Patient is a 79y old  Male who presents with a chief complaint of TIA (22 Nov 2024 16:25)      INTERVAL HPI/OVERNIGHT EVENTS:  Pt was seen and examined, no acute events.      MEDICATIONS  (STANDING):  amoxicillin  875 milliGRAM(s)/clavulanate 1 Tablet(s) Oral two times a day  aspirin enteric coated 81 milliGRAM(s) Oral daily  atorvastatin 20 milliGRAM(s) Oral at bedtime  citalopram 20 milliGRAM(s) Oral daily  clopidogrel Tablet 75 milliGRAM(s) Oral daily  divalproex  milliGRAM(s) Oral <User Schedule>  enoxaparin Injectable 40 milliGRAM(s) SubCutaneous every 24 hours  fluticasone propionate/ salmeterol 100-50 MICROgram(s) Diskus 1 Dose(s) Inhalation two times a day  lactulose Syrup 10 Gram(s) Oral daily  OLANZapine 10 milliGRAM(s) Oral daily  senna 2 Tablet(s) Oral at bedtime  tamsulosin 0.4 milliGRAM(s) Oral at bedtime    MEDICATIONS  (PRN):  acetaminophen     Tablet .. 650 milliGRAM(s) Oral every 6 hours PRN Temp greater or equal to 38C (100.4F), Mild Pain (1 - 3)  albuterol    90 MICROgram(s) HFA Inhaler 2 Puff(s) Inhalation every 6 hours PRN Shortness of Breath and/or Wheezing  aluminum hydroxide/magnesium hydroxide/simethicone Suspension 30 milliLiter(s) Oral every 4 hours PRN Dyspepsia  melatonin 3 milliGRAM(s) Oral at bedtime PRN Insomnia      Allergies  iodine (Unknown)  betadine (Unknown)        Vital Signs Last 24 Hrs  T(C): 36.2 (23 Nov 2024 10:45), Max: 36.7 (22 Nov 2024 23:35)  T(F): 97.2 (23 Nov 2024 10:45), Max: 98 (22 Nov 2024 23:35)  HR: 96 (23 Nov 2024 10:45) (70 - 96)  BP: 150/68 (23 Nov 2024 10:45) (130/78 - 150/68)  BP(mean): --  RR: 18 (23 Nov 2024 10:45) (18 - 18)  SpO2: 95% (23 Nov 2024 10:45) (91% - 100%)    Parameters below as of 23 Nov 2024 04:28  Patient On (Oxygen Delivery Method): nasal cannula  O2 Flow (L/min): 3      PHYSICAL EXAM:  GENERAL: NAD  HEAD:  Atraumatic  EYES: PERRLA  ENMT: Mouth moist   NECK: Supple  NERVOUS SYSTEM:  Awake, alert  CHEST/LUNG: Clear  HEART: RRR, S1, S2  ABDOMEN: Soft, non tender  EXTREMITIES: no edema BL LE   SKIN: No rash          LABS:                        12.8   11.67 )-----------( 203      ( 22 Nov 2024 07:45 )             35.9     11-22    130[L]  |  92[L]  |  9   ----------------------------<  99  3.9   |  32[H]  |  0.58    Ca    9.0      22 Nov 2024 07:45    TPro  6.3  /  Alb  2.9[L]  /  TBili  0.5  /  DBili  x   /  AST  17  /  ALT  20  /  AlkPhos  84  11-22      Urinalysis Basic - ( 22 Nov 2024 07:45 )    Color: x / Appearance: x / SG: x / pH: x  Gluc: 99 mg/dL / Ketone: x  / Bili: x / Urobili: x   Blood: x / Protein: x / Nitrite: x   Leuk Esterase: x / RBC: x / WBC x   Sq Epi: x / Non Sq Epi: x / Bacteria: x      CAPILLARY BLOOD GLUCOSE          Urinalysis with Rflx Culture (collected 21 Nov 2024 18:30)      RADIOLOGY & ADDITIONAL TESTS:    Imaging Personally Reviewed:  [ ] YES  [ ] NO    Consultant(s) Notes Reviewed:  [ ] YES  [ ] NO    Care Discussed with Consultants/Other Providers [ ] YES  [ ] NO

## 2024-11-23 NOTE — EEG REPORT - NS EEG TEXT BOX
[facility]   COMPREHENSIVE EPILEPSY CENTER   REPORT OF ROUTINE EEG W/ Video     [facility]: [address], [place] [address], Ph#: [phone]  [facility]: [id number] [facility] [place] [address], Ph#: [phone]  [facility]: [address] [place] [address], Ph#: [phone]    Patient Name: JAZMYNE COOK  Age and : 79y (47)  MRN #: [id number]  Location: [facility] [address]  Referring Physician: Sophie Combs    Study Date: 24    _____________________________________________________________  TECHNICAL INFORMATION    Placement and Labeling of Electrodes:  The EEG was performed utilizing 20 channels referential EEG connections (coronal over temporal over parasagittal montage) using all standard 10-20 electrode placements with EKG.  Recording was at a sampling rate of 256 samples per second per channel.  Time synchronized digital video recording was done simultaneously with EEG recording.  A low light infrared camera was used for low light recording.  Demian and seizure detection algorithms were utilized.    _____________________________________________________________  HISTORY    Patient is a 79y old  Male who presents with a chief complaint of TIA (2024 16:25)      PERTINENT MEDICATION:  MEDICATIONS  (STANDING):  aspirin enteric coated 81 milliGRAM(s) Oral daily  atorvastatin 20 milliGRAM(s) Oral at bedtime  citalopram 20 milliGRAM(s) Oral daily  clopidogrel Tablet 75 milliGRAM(s) Oral daily  divalproex  milliGRAM(s) Oral <User Schedule>  enoxaparin Injectable 40 milliGRAM(s) SubCutaneous every 24 hours  fluticasone propionate/ salmeterol 100-50 MICROgram(s) Diskus 1 Dose(s) Inhalation two times a day  lactulose Syrup 10 Gram(s) Oral daily  OLANZapine 10 milliGRAM(s) Oral daily  senna 2 Tablet(s) Oral at bedtime  tamsulosin 0.4 milliGRAM(s) Oral at bedtime    _____________________________________________________________  STUDY INTERPRETATION    Findings: The background was continuous, spontaneously variable and reactive. During wakefulness, the posterior dominant rhythm consisted of symmetric,7Hz activity, with amplitude to 30 uV, that attenuated to eye opening.  Low amplitude frontal beta was noted in wakefulness.    Background Slowing:  Excess diffuse polymorphic delta slowing.    Focal Slowing:   None were present.  I  Sleep Background:  Drowsiness was characterized by fragmentation, attenuation, and slowing of the background activity.    Sleep was characterized by the presence of vertex waves, symmetric sleep spindles and K-complexes.    Other Non-Epileptiform Findings:  None were present.    Interictal Epileptiform Activity:   None were present.    Events:  Clinical events: None recorded.  Seizures: None recorded.    Activation Procedures:   Hyperventilation was not performed.    Photic stimulation was performed and did not elicit any abnormality.     Artifacts:  Intermittent myogenic and movement artifacts were noted.    ECG:  The heart rate on single channel ECG was predominantly between xx BPM.    _____________________________________________________________  EEG SUMMARY/CLASSIFICATION      Abnormal EEG    - Mild generalized slowing.    _____________________________________________________________  EEG IMPRESSION/CLINICAL CORRELATE    Abnormal EEG study.  Mild nonspecific diffuse or multifocal cerebral dysfunction.   No epileptiform pattern or seizure seen.    Mj Hale MD  EEG/Epilepsy Attending

## 2024-11-23 NOTE — PROGRESS NOTE ADULT - ASSESSMENT
Patient is a 79M, with PMHx of HTN, HLD, COPD, schizophrenia, mild intellectual disability, who was brought in from group home for slurred speech and R facial droop. Admitted for TIA.    Group home papers did not include medication list. Will continue meds based on Oct 18th, 2024 discharge. asked them to fax med list.

## 2024-11-24 LAB
BASE EXCESS BLDA CALC-SCNC: 13.8 MMOL/L — HIGH (ref -2–3)
BLOOD GAS COMMENTS ARTERIAL: SIGNIFICANT CHANGE UP
CO2 BLDA-SCNC: 42 MMOL/L — HIGH (ref 19–24)
GAS PNL BLDA: SIGNIFICANT CHANGE UP
HCO3 BLDA-SCNC: 40 MMOL/L — HIGH (ref 21–28)
HOROWITZ INDEX BLDA+IHG-RTO: 21 — SIGNIFICANT CHANGE UP
PCO2 BLDA: 55 MMHG — HIGH (ref 32–46)
PH BLDA: 7.47 — HIGH (ref 7.35–7.45)
PO2 BLDA: 73 MMHG — LOW (ref 83–108)
SAO2 % BLDA: 95.9 % — SIGNIFICANT CHANGE UP (ref 94–98)

## 2024-11-24 PROCEDURE — 99233 SBSQ HOSP IP/OBS HIGH 50: CPT

## 2024-11-24 PROCEDURE — 93306 TTE W/DOPPLER COMPLETE: CPT | Mod: 26

## 2024-11-24 RX ADMIN — Medication 500 MILLIGRAM(S): at 11:51

## 2024-11-24 RX ADMIN — LACTULOSE 10 GRAM(S): 10 SOLUTION ORAL at 11:52

## 2024-11-24 RX ADMIN — Medication 20 MILLIGRAM(S): at 22:19

## 2024-11-24 RX ADMIN — FLUTICASONE PROPIONATE AND SALMETEROL XINAFOATE 1 DOSE(S): 45; 21 AEROSOL, METERED RESPIRATORY (INHALATION) at 17:38

## 2024-11-24 RX ADMIN — FLUTICASONE PROPIONATE AND SALMETEROL XINAFOATE 1 DOSE(S): 45; 21 AEROSOL, METERED RESPIRATORY (INHALATION) at 05:19

## 2024-11-24 RX ADMIN — Medication 20 MILLIGRAM(S): at 11:51

## 2024-11-24 RX ADMIN — ENOXAPARIN SODIUM 40 MILLIGRAM(S): 30 INJECTION SUBCUTANEOUS at 17:14

## 2024-11-24 RX ADMIN — Medication 81 MILLIGRAM(S): at 11:51

## 2024-11-24 RX ADMIN — OLANZAPINE 10 MILLIGRAM(S): 20 TABLET ORAL at 11:50

## 2024-11-24 RX ADMIN — CLOPIDOGREL 75 MILLIGRAM(S): 75 TABLET, FILM COATED ORAL at 11:51

## 2024-11-24 RX ADMIN — Medication 1 TABLET(S): at 17:38

## 2024-11-24 RX ADMIN — Medication 2 TABLET(S): at 22:18

## 2024-11-24 RX ADMIN — Medication 500 MILLIGRAM(S): at 22:17

## 2024-11-24 RX ADMIN — TAMSULOSIN HYDROCHLORIDE 0.4 MILLIGRAM(S): 0.4 CAPSULE ORAL at 22:18

## 2024-11-24 RX ADMIN — Medication 1 TABLET(S): at 05:19

## 2024-11-24 NOTE — DIETITIAN INITIAL EVALUATION ADULT - PERTINENT MEDS FT
MEDICATIONS  (STANDING):  amoxicillin  875 milliGRAM(s)/clavulanate 1 Tablet(s) Oral two times a day  aspirin enteric coated 81 milliGRAM(s) Oral daily  atorvastatin 20 milliGRAM(s) Oral at bedtime  citalopram 20 milliGRAM(s) Oral daily  clopidogrel Tablet 75 milliGRAM(s) Oral daily  divalproex  milliGRAM(s) Oral <User Schedule>  enoxaparin Injectable 40 milliGRAM(s) SubCutaneous every 24 hours  fluticasone propionate/ salmeterol 100-50 MICROgram(s) Diskus 1 Dose(s) Inhalation two times a day  lactulose Syrup 10 Gram(s) Oral daily  OLANZapine 10 milliGRAM(s) Oral daily  senna 2 Tablet(s) Oral at bedtime  tamsulosin 0.4 milliGRAM(s) Oral at bedtime    MEDICATIONS  (PRN):  acetaminophen     Tablet .. 650 milliGRAM(s) Oral every 6 hours PRN Temp greater or equal to 38C (100.4F), Mild Pain (1 - 3)  albuterol    90 MICROgram(s) HFA Inhaler 2 Puff(s) Inhalation every 6 hours PRN Shortness of Breath and/or Wheezing  aluminum hydroxide/magnesium hydroxide/simethicone Suspension 30 milliLiter(s) Oral every 4 hours PRN Dyspepsia  melatonin 3 milliGRAM(s) Oral at bedtime PRN Insomnia

## 2024-11-24 NOTE — DIETITIAN NUTRITION RISK NOTIFICATION - TREATMENT: THE FOLLOWING DIET HAS BEEN RECOMMENDED
Diet, Pureed:   Mildly Thick Liquids (MILDTHICKLIQS)  Supplement Feeding Modality:  Oral  Health Shake Cans or Servings Per Day:  1       Frequency:  Two Times a day (11-24-24 @ 12:24) [Pending Verification By Attending]  Diet, Pureed:   Mildly Thick Liquids (MILDTHICKLIQS) (11-23-24 @ 15:04) [Active]

## 2024-11-24 NOTE — DIETITIAN INITIAL EVALUATION ADULT - PROBLEM SELECTOR PLAN 2
- P/w Na 124  - Has hx of hypovolemic hyponatremia  - ACTH, AM cortisol wnl last admission a month ago  - Will trial 500mL NS bolus

## 2024-11-24 NOTE — DIETITIAN INITIAL EVALUATION ADULT - OTHER INFO
Pt seen on medical floor, adm w/ TIA; p/w slurred speech & R facial droop that is no longer present. COPD ; HTN ; Schizophrenia ; HLD. Difficulty understanding ( unrelated to language ) Pt's staff member w/ him at bedside. Pt w/ Intellectual disability. No reports of N/V/D/C/, No food allergies . Pt is consuming 75 - 100% meals. Tolerating diet well. No c/o at this time. Will provide a mild thick liquid nutritional supplement 2x/d

## 2024-11-24 NOTE — PROGRESS NOTE ADULT - SUBJECTIVE AND OBJECTIVE BOX
Patient is a 79y old  Male who presents with a chief complaint of SLURRED SPEECH (24 Nov 2024 12:07)      INTERVAL HPI/OVERNIGHT EVENTS:  Pt was seen and examined, no acute events.      MEDICATIONS  (STANDING):  amoxicillin  875 milliGRAM(s)/clavulanate 1 Tablet(s) Oral two times a day  aspirin enteric coated 81 milliGRAM(s) Oral daily  atorvastatin 20 milliGRAM(s) Oral at bedtime  citalopram 20 milliGRAM(s) Oral daily  clopidogrel Tablet 75 milliGRAM(s) Oral daily  divalproex  milliGRAM(s) Oral <User Schedule>  enoxaparin Injectable 40 milliGRAM(s) SubCutaneous every 24 hours  fluticasone propionate/ salmeterol 100-50 MICROgram(s) Diskus 1 Dose(s) Inhalation two times a day  lactulose Syrup 10 Gram(s) Oral daily  OLANZapine 10 milliGRAM(s) Oral daily  senna 2 Tablet(s) Oral at bedtime  tamsulosin 0.4 milliGRAM(s) Oral at bedtime    MEDICATIONS  (PRN):  acetaminophen     Tablet .. 650 milliGRAM(s) Oral every 6 hours PRN Temp greater or equal to 38C (100.4F), Mild Pain (1 - 3)  albuterol    90 MICROgram(s) HFA Inhaler 2 Puff(s) Inhalation every 6 hours PRN Shortness of Breath and/or Wheezing  aluminum hydroxide/magnesium hydroxide/simethicone Suspension 30 milliLiter(s) Oral every 4 hours PRN Dyspepsia  melatonin 3 milliGRAM(s) Oral at bedtime PRN Insomnia      Allergies    iodine (Unknown)  betadine (Unknown)    Intolerances          Vital Signs Last 24 Hrs  T(C): 36.6 (24 Nov 2024 11:44), Max: 36.7 (24 Nov 2024 05:02)  T(F): 97.8 (24 Nov 2024 11:44), Max: 98 (24 Nov 2024 05:02)  HR: 93 (24 Nov 2024 11:44) (69 - 95)  BP: 128/76 (24 Nov 2024 11:44) (124/75 - 128/84)  BP(mean): 91 (23 Nov 2024 16:13) (91 - 91)  RR: 18 (24 Nov 2024 11:44) (18 - 18)  SpO2: 95% (24 Nov 2024 11:44) (93% - 95%)    Parameters below as of 24 Nov 2024 05:02  Patient On (Oxygen Delivery Method): nasal cannula  O2 Flow (L/min): 3      PHYSICAL EXAM:  GENERAL: NAD  HEAD:  Atraumatic  EYES: PERRLA  ENMT: Mouth moist   NECK: Supple  NERVOUS SYSTEM:  Awake, alert  CHEST/LUNG: Clear  HEART: RRR, S1, S2  ABDOMEN: Soft, non tender  EXTREMITIES: no edema BL LE   SKIN: No rash          LABS:        CAPILLARY BLOOD GLUCOSE          Urinalysis with Rflx Culture (collected 21 Nov 2024 18:30)      RADIOLOGY & ADDITIONAL TESTS:    Imaging Personally Reviewed:  [ ] YES  [ ] NO    Consultant(s) Notes Reviewed:  [ ] YES  [ ] NO    Care Discussed with Consultants/Other Providers [ ] YES  [ ] NO

## 2024-11-24 NOTE — DIETITIAN INITIAL EVALUATION ADULT - NUTRITIONGOAL OUTCOME1
Pt will Meet calorie and protein needs > 75% via meals / supplements to aid w/  prevent wt loss during hospitalization.

## 2024-11-24 NOTE — DIETITIAN INITIAL EVALUATION ADULT - PROBLEM SELECTOR PLAN 4
- C/w home inhalers  - On room air saturating 98%  - Not wheezing  - CXR = Clear lungs with no acute abnormalities.

## 2024-11-25 PROCEDURE — 99233 SBSQ HOSP IP/OBS HIGH 50: CPT

## 2024-11-25 PROCEDURE — 99223 1ST HOSP IP/OBS HIGH 75: CPT

## 2024-11-25 RX ADMIN — Medication 500 MILLIGRAM(S): at 08:39

## 2024-11-25 RX ADMIN — Medication 1 TABLET(S): at 05:46

## 2024-11-25 RX ADMIN — Medication 20 MILLIGRAM(S): at 11:29

## 2024-11-25 RX ADMIN — OLANZAPINE 10 MILLIGRAM(S): 20 TABLET ORAL at 11:29

## 2024-11-25 RX ADMIN — LACTULOSE 10 GRAM(S): 10 SOLUTION ORAL at 11:29

## 2024-11-25 RX ADMIN — Medication 81 MILLIGRAM(S): at 11:28

## 2024-11-25 RX ADMIN — ENOXAPARIN SODIUM 40 MILLIGRAM(S): 30 INJECTION SUBCUTANEOUS at 17:18

## 2024-11-25 RX ADMIN — Medication 20 MILLIGRAM(S): at 21:08

## 2024-11-25 RX ADMIN — FLUTICASONE PROPIONATE AND SALMETEROL XINAFOATE 1 DOSE(S): 45; 21 AEROSOL, METERED RESPIRATORY (INHALATION) at 17:17

## 2024-11-25 RX ADMIN — Medication 1 TABLET(S): at 17:18

## 2024-11-25 RX ADMIN — Medication 2 TABLET(S): at 21:08

## 2024-11-25 RX ADMIN — CLOPIDOGREL 75 MILLIGRAM(S): 75 TABLET, FILM COATED ORAL at 11:28

## 2024-11-25 RX ADMIN — Medication 500 MILLIGRAM(S): at 21:08

## 2024-11-25 RX ADMIN — TAMSULOSIN HYDROCHLORIDE 0.4 MILLIGRAM(S): 0.4 CAPSULE ORAL at 21:08

## 2024-11-25 RX ADMIN — FLUTICASONE PROPIONATE AND SALMETEROL XINAFOATE 1 DOSE(S): 45; 21 AEROSOL, METERED RESPIRATORY (INHALATION) at 05:48

## 2024-11-25 NOTE — CONSULT NOTE ADULT - ASSESSMENT
79 year old F with PMHx schizophrenia (lives in group home), COPD, intellectual disability and HTN presented to hospital from group home with RT facial droop & slurred speech. Symptoms resolved spontaneously and stroke work up has been negative thus far. Pulmonary consulted to evaluate for possible aspiration PNA.       Dx: acute hypoxic respiratory failure, aspiration PNA?       Recommendations:   - CT Chest: Scattered bronchial secretions.  Emphysema. Patchy bilateral lung opacities with a lower lobe predominance.  - TTE: relatively normal study; Normal EF.   -  stroke work up negative thus far   - WBC elevated on labs from 3 days ago. afebrile.   - RVP negative  - 3L NC with Spo2 94%. Please titrate as tolerated for goal Spo2 >92%   - currently on augmentin for aspiration PNA   - if there is concern for possible aspiration - patient should be made NPO and undergo official speech and swallow evaluation.   - please send legionella, mycoplasma, strep PNA, MRSA PCR.   - please send sputum culture if able to expectorate   - would further clarify goals of care including code/intubation status and if needed who surrogate decision maker is for this patient as they are with intellectual disability. Should hospital course be prolonged or condition worsen it will become important to determine if patient falls under OPWDD or a surrogate decision maker will need to be appointed.   - rest of care per primary team        79 year old F with PMHx schizophrenia (lives in group home), COPD, intellectual disability and HTN presented to hospital from group home with RT facial droop & slurred speech. Symptoms resolved spontaneously and stroke work up has been negative thus far. Pulmonary consulted to evaluate for possible aspiration PNA.       Dx: acute hypoxic respiratory failure, aspiration PNA?       Recommendations:   - CT Chest: Scattered bronchial secretions.  Emphysema. Patchy bilateral lung opacities with a lower lobe predominance.  - TTE: relatively normal study; Normal EF.   -  stroke work up negative thus far   - WBC elevated on labs from 3 days ago. afebrile.   - RVP negative  -  on ROOM AIR with Spo2 94%. Please titrate as tolerated for goal Spo2 >92%   - can continue on augmentin for possible aspiration PNA   - if there is concern for possible aspiration - patient should be made NPO and undergo official speech and swallow evaluation.   - please send legionella, mycoplasma, strep PNA, MRSA PCR.   - would further clarify goals of care including code/intubation status and if needed who surrogate decision maker is for this patient as they are with intellectual disability. Should hospital course be prolonged or condition worsen it will become important to determine if patient falls under OPWDD or a surrogate decision maker will need to be appointed.   - rest of care per primary team     Discussed with Dr. Banda.

## 2024-11-25 NOTE — PROGRESS NOTE ADULT - PROBLEM SELECTOR PLAN 2
- Has hx of hypovolemic hyponatremia  - ACTH, AM cortisol wnl last admission a month ago  - Na stable
- P/w Na 124  - Has hx of hypovolemic hyponatremia  - ACTH, AM cortisol wnl last admission a month ago  - Will trial 500mL NS bolus

## 2024-11-25 NOTE — CONSULT NOTE ADULT - SUBJECTIVE AND OBJECTIVE BOX
CHIEF COMPLAINT: aspiration    HPI: 79 year old F with PMHx schizophrenia (lives in group home), COPD, intellectual disability and HTN presented to hospital from group home with RT facial droop & slurred speech. Symptoms resolved spontaneously and stroke work up has been negative thus far. Patient requiring 3L NC.  Pulmonary consulted to evaluate for possible aspiration PNA.     PAST MEDICAL & SURGICAL HISTORY:  Schizophrenia      History of COPD      Intellectual disability      Benign essential HTN          FAMILY HISTORY: unable to obtain      SOCIAL HISTORY:  unable to obtain     Allergies    iodine (Unknown)  betadine (Unknown)    Intolerances        HOME MEDICATIONS:    REVIEW OF SYSTEMS:  Constitutional: [ ] negative [ ] fevers [ ] chills [ ] weight loss [ ] weight gain  HEENT: [ ] negative [ ] dry eyes [ ] eye irritation [ ] postnasal drip [ ] nasal congestion  CV: [ ] negative  [ ] chest pain [ ] orthopnea [ ] palpitations [ ] murmur  Resp: [ ] negative [ ] cough [ ] shortness of breath [ ] dyspnea [ ] wheezing [ ] sputum [ ] hemoptysis  GI: [ ] negative [ ] nausea [ ] vomiting [ ] diarrhea [ ] constipation [ ] abd pain [ ] dysphagia   : [ ] negative [ ] dysuria [ ] nocturia [ ] hematuria [ ] increased urinary frequency  Musculoskeletal: [ ] negative [ ] back pain [ ] myalgias [ ] arthralgias [ ] fracture  Skin: [ ] negative [ ] rash [ ] itch  Neurological: [ ] negative [ ] headache [ ] dizziness [ ] syncope [ ] weakness [ ] numbness  Psychiatric: [ ] negative [ ] anxiety [ ] depression  Endocrine: [ ] negative [ ] diabetes [ ] thyroid problem  Hematologic/Lymphatic: [ ] negative [ ] anemia [ ] bleeding problem  Allergic/Immunologic: [ ] negative [ ] itchy eyes [ ] nasal discharge [ ] hives [ ] angioedema  [ ] All other systems negative  [X ] Unable to assess ROS because intellectual disability     OBJECTIVE:  ICU Vital Signs Last 24 Hrs  T(C): 36.6 (25 Nov 2024 11:19), Max: 37.5 (24 Nov 2024 23:10)  T(F): 97.9 (25 Nov 2024 11:19), Max: 99.5 (24 Nov 2024 23:10)  HR: 93 (25 Nov 2024 11:19) (91 - 104)  BP: 126/81 (25 Nov 2024 11:19) (123/80 - 146/86)  BP(mean): 103 (24 Nov 2024 16:09) (103 - 103)  ABP: --  ABP(mean): --  RR: 18 (25 Nov 2024 11:19) (18 - 18)  SpO2: 91% (25 Nov 2024 11:19) (90% - 97%)    O2 Parameters below as of 25 Nov 2024 11:19  Patient On (Oxygen Delivery Method): room air              11-24 @ 07:01  -  11-25 @ 07:00  --------------------------------------------------------  IN: 680 mL / OUT: 0 mL / NET: 680 mL      CAPILLARY BLOOD GLUCOSE          PHYSICAL EXAM:  GENERAL: NAD, lying in bed comfortably  HEAD:  Atraumatic, normocephalic  EYES: EOMI, PERRLA, conjunctiva and sclera clear  NECK: Supple, trachea midline, no JVD  HEART: Regular rate and rhythm, no murmurs, rubs, or gallops  LUNGS: Unlabored respirations.  Clear to auscultation bilaterally, no crackles, wheezing, or rhonchi  ABDOMEN: Soft, nontender, nondistended, +BS  EXTREMITIES: 2+ peripheral pulses bilaterally. No clubbing, cyanosis, or edema  NERVOUS SYSTEM:  A&Ox3, moving all extremities, no focal deficits   SKIN: No rashes or lesions    HOSPITAL MEDICATIONS:  aspirin enteric coated 81 milliGRAM(s) Oral daily  clopidogrel Tablet 75 milliGRAM(s) Oral daily  enoxaparin Injectable 40 milliGRAM(s) SubCutaneous every 24 hours    amoxicillin  875 milliGRAM(s)/clavulanate 1 Tablet(s) Oral two times a day      atorvastatin 20 milliGRAM(s) Oral at bedtime    albuterol    90 MICROgram(s) HFA Inhaler 2 Puff(s) Inhalation every 6 hours PRN  fluticasone propionate/ salmeterol 100-50 MICROgram(s) Diskus 1 Dose(s) Inhalation two times a day    acetaminophen     Tablet .. 650 milliGRAM(s) Oral every 6 hours PRN  citalopram 20 milliGRAM(s) Oral daily  divalproex  milliGRAM(s) Oral <User Schedule>  melatonin 3 milliGRAM(s) Oral at bedtime PRN  OLANZapine 10 milliGRAM(s) Oral daily    aluminum hydroxide/magnesium hydroxide/simethicone Suspension 30 milliLiter(s) Oral every 4 hours PRN  lactulose Syrup 10 Gram(s) Oral daily  senna 2 Tablet(s) Oral at bedtime      tamsulosin 0.4 milliGRAM(s) Oral at bedtime              LABS:    Hgb Trend: 12.8<--, 12.4<--        Creatinine Trend: 0.58<--, 0.46<--      Arterial Blood Gas:  11-24 @ 04:40  7.47/55/73/40/95.9/13.8  ABG lactate: --       CHIEF COMPLAINT: aspiration    HPI: 79 year old F with PMHx schizophrenia (lives in group home), COPD, intellectual disability and HTN presented to hospital from group home with RT facial droop & slurred speech. Symptoms resolved spontaneously and stroke work up has been negative thus far. Patient requiring 3L NC.  Pulmonary consulted to evaluate for possible aspiration PNA. Patient with developmental delay but states he feels " alright" and breathing is " pretty good". He appears comfortable and talking in full sentences. Aide at bedside states that patient sometimes coughs when being fed but it is intermittent.     PAST MEDICAL & SURGICAL HISTORY:  Schizophrenia      History of COPD      Intellectual disability      Benign essential HTN          FAMILY HISTORY: unable to obtain      SOCIAL HISTORY:  unable to obtain     Allergies    iodine (Unknown)  betadine (Unknown)    Intolerances        HOME MEDICATIONS:    REVIEW OF SYSTEMS:  Constitutional: [ ] negative [ ] fevers [ ] chills [ ] weight loss [ ] weight gain  HEENT: [ ] negative [ ] dry eyes [ ] eye irritation [ ] postnasal drip [ ] nasal congestion  CV: [ ] negative  [ ] chest pain [ ] orthopnea [ ] palpitations [ ] murmur  Resp: [ ] negative [ ] cough [ ] shortness of breath [ ] dyspnea [ ] wheezing [ ] sputum [ ] hemoptysis  GI: [ ] negative [ ] nausea [ ] vomiting [ ] diarrhea [ ] constipation [ ] abd pain [ ] dysphagia   : [ ] negative [ ] dysuria [ ] nocturia [ ] hematuria [ ] increased urinary frequency  Musculoskeletal: [ ] negative [ ] back pain [ ] myalgias [ ] arthralgias [ ] fracture  Skin: [ ] negative [ ] rash [ ] itch  Neurological: [ ] negative [ ] headache [ ] dizziness [ ] syncope [ ] weakness [ ] numbness  Psychiatric: [ ] negative [ ] anxiety [ ] depression  Endocrine: [ ] negative [ ] diabetes [ ] thyroid problem  Hematologic/Lymphatic: [ ] negative [ ] anemia [ ] bleeding problem  Allergic/Immunologic: [ ] negative [ ] itchy eyes [ ] nasal discharge [ ] hives [ ] angioedema  [ ] All other systems negative  [X ] Unable to assess ROS because intellectual disability     OBJECTIVE:  ICU Vital Signs Last 24 Hrs  T(C): 36.6 (25 Nov 2024 11:19), Max: 37.5 (24 Nov 2024 23:10)  T(F): 97.9 (25 Nov 2024 11:19), Max: 99.5 (24 Nov 2024 23:10)  HR: 93 (25 Nov 2024 11:19) (91 - 104)  BP: 126/81 (25 Nov 2024 11:19) (123/80 - 146/86)  BP(mean): 103 (24 Nov 2024 16:09) (103 - 103)  ABP: --  ABP(mean): --  RR: 18 (25 Nov 2024 11:19) (18 - 18)  SpO2: 91% (25 Nov 2024 11:19) (90% - 97%)    O2 Parameters below as of 25 Nov 2024 11:19  Patient On (Oxygen Delivery Method): room air              11-24 @ 07:01  -  11-25 @ 07:00  --------------------------------------------------------  IN: 680 mL / OUT: 0 mL / NET: 680 mL      CAPILLARY BLOOD GLUCOSE          PHYSICAL EXAM:  GENERAL: NAD, lying in bed comfortably  HEAD:  Atraumatic, normocephalic  EYES: EOMI, PERRLA, conjunctiva and sclera clear  NECK: Supple, trachea midline, no JVD  HEART: Regular rate and rhythm, no murmurs, rubs, or gallops  LUNGS: Unlabored respirations.  Clear to auscultation bilaterally, no crackles, wheezing, or rhonchi  ABDOMEN: Soft, nontender, nondistended, +BS  EXTREMITIES: 2+ peripheral pulses bilaterally. No clubbing, cyanosis, or edema  NERVOUS SYSTEM:  A&Ox3, moving all extremities, no focal deficits   SKIN: No rashes or lesions    HOSPITAL MEDICATIONS:  aspirin enteric coated 81 milliGRAM(s) Oral daily  clopidogrel Tablet 75 milliGRAM(s) Oral daily  enoxaparin Injectable 40 milliGRAM(s) SubCutaneous every 24 hours    amoxicillin  875 milliGRAM(s)/clavulanate 1 Tablet(s) Oral two times a day      atorvastatin 20 milliGRAM(s) Oral at bedtime    albuterol    90 MICROgram(s) HFA Inhaler 2 Puff(s) Inhalation every 6 hours PRN  fluticasone propionate/ salmeterol 100-50 MICROgram(s) Diskus 1 Dose(s) Inhalation two times a day    acetaminophen     Tablet .. 650 milliGRAM(s) Oral every 6 hours PRN  citalopram 20 milliGRAM(s) Oral daily  divalproex  milliGRAM(s) Oral <User Schedule>  melatonin 3 milliGRAM(s) Oral at bedtime PRN  OLANZapine 10 milliGRAM(s) Oral daily    aluminum hydroxide/magnesium hydroxide/simethicone Suspension 30 milliLiter(s) Oral every 4 hours PRN  lactulose Syrup 10 Gram(s) Oral daily  senna 2 Tablet(s) Oral at bedtime      tamsulosin 0.4 milliGRAM(s) Oral at bedtime              LABS:    Hgb Trend: 12.8<--, 12.4<--        Creatinine Trend: 0.58<--, 0.46<--      Arterial Blood Gas:  11-24 @ 04:40  7.47/55/73/40/95.9/13.8  ABG lactate: --       CHIEF COMPLAINT: aspiration    HPI: 79 year old M with PMHx schizophrenia (lives in group home), COPD, intellectual disability and HTN presented to hospital from group home with RT facial droop & slurred speech. Symptoms resolved spontaneously and stroke work up has been negative thus far. Patient requiring 3L NC.  Pulmonary consulted to evaluate for possible aspiration PNA. Patient with developmental delay but states he feels " alright" and breathing is " pretty good". He appears comfortable and talking in full sentences. Aide at bedside states that patient sometimes coughs when being fed but it is intermittent. Eats "chopped" food per aide at group home.     PAST MEDICAL & SURGICAL HISTORY:  Schizophrenia      History of COPD      Intellectual disability      Benign essential HTN          FAMILY HISTORY: unable to obtain      SOCIAL HISTORY:  unable to obtain       Allergies  iodine (Unknown)  betadine (Unknown)          REVIEW OF SYSTEMS:  Constitutional: [ ] negative [ ] fevers [ ] chills [ ] weight loss [ ] weight gain  HEENT: [ ] negative [ ] dry eyes [ ] eye irritation [ ] postnasal drip [ ] nasal congestion  CV: [ ] negative  [ ] chest pain [ ] orthopnea [ ] palpitations [ ] murmur  Resp: [ ] negative [ ] cough [ ] shortness of breath [ ] dyspnea [ ] wheezing [ ] sputum [ ] hemoptysis  GI: [ ] negative [ ] nausea [ ] vomiting [ ] diarrhea [ ] constipation [ ] abd pain [ ] dysphagia   : [ ] negative [ ] dysuria [ ] nocturia [ ] hematuria [ ] increased urinary frequency  Musculoskeletal: [ ] negative [ ] back pain [ ] myalgias [ ] arthralgias [ ] fracture  Skin: [ ] negative [ ] rash [ ] itch  Neurological: [ ] negative [ ] headache [ ] dizziness [ ] syncope [ ] weakness [ ] numbness  Psychiatric: [ ] negative [ ] anxiety [ ] depression  Endocrine: [ ] negative [ ] diabetes [ ] thyroid problem  Hematologic/Lymphatic: [ ] negative [ ] anemia [ ] bleeding problem  Allergic/Immunologic: [ ] negative [ ] itchy eyes [ ] nasal discharge [ ] hives [ ] angioedema  [ ] All other systems negative  [X ] Unable to assess ROS because intellectual disability     OBJECTIVE:  Vital Signs Last 24 Hrs  T(C): 36.6 (25 Nov 2024 11:19), Max: 37.5 (24 Nov 2024 23:10)  T(F): 97.9 (25 Nov 2024 11:19), Max: 99.5 (24 Nov 2024 23:10)  HR: 93 (25 Nov 2024 11:19) (91 - 104)  BP: 126/81 (25 Nov 2024 11:19) (123/80 - 146/86)  BP(mean): 103 (24 Nov 2024 16:09) (103 - 103)  RR: 18 (25 Nov 2024 11:19) (18 - 18)  SpO2: 91% (25 Nov 2024 11:19) (90% - 97%)    O2 Parameters below as of 25 Nov 2024 11:19  Patient On (Oxygen Delivery Method): room air        11-24 @ 07:01  -  11-25 @ 07:00  --------------------------------------------------------  IN: 680 mL / OUT: 0 mL / NET: 680 mL          PHYSICAL EXAM:  GENERAL: elderly, NAD, lying in bed comfortably  HEAD:  Atraumatic, normocephalic  EYES: EOMI, PERRLA, conjunctiva and sclera clear  NECK: Supple, trachea midline, no JVD  HEART: Regular rate and rhythm, no murmurs, rubs, or gallops  LUNGS: Unlabored respirations.  Clear to auscultation bilaterally, no crackles, wheezing, or rhonchi  ABDOMEN: Soft, nontender, nondistended, +BS  EXTREMITIES: 2+ peripheral pulses bilaterally. No clubbing, cyanosis, or edema  NERVOUS SYSTEM:  awake, alert, moving all extremities, no focal deficits, follows commands  SKIN: No rashes or lesions    HOSPITAL MEDICATIONS:  aspirin enteric coated 81 milliGRAM(s) Oral daily  clopidogrel Tablet 75 milliGRAM(s) Oral daily  enoxaparin Injectable 40 milliGRAM(s) SubCutaneous every 24 hours    amoxicillin  875 milliGRAM(s)/clavulanate 1 Tablet(s) Oral two times a day      atorvastatin 20 milliGRAM(s) Oral at bedtime    albuterol    90 MICROgram(s) HFA Inhaler 2 Puff(s) Inhalation every 6 hours PRN  fluticasone propionate/ salmeterol 100-50 MICROgram(s) Diskus 1 Dose(s) Inhalation two times a day    acetaminophen     Tablet .. 650 milliGRAM(s) Oral every 6 hours PRN  citalopram 20 milliGRAM(s) Oral daily  divalproex  milliGRAM(s) Oral <User Schedule>  melatonin 3 milliGRAM(s) Oral at bedtime PRN  OLANZapine 10 milliGRAM(s) Oral daily    aluminum hydroxide/magnesium hydroxide/simethicone Suspension 30 milliLiter(s) Oral every 4 hours PRN  lactulose Syrup 10 Gram(s) Oral daily  senna 2 Tablet(s) Oral at bedtime      tamsulosin 0.4 milliGRAM(s) Oral at bedtime              LABS:    Hgb Trend: 12.8<--, 12.4<--        Creatinine Trend: 0.58<--, 0.46<--      Arterial Blood Gas:  11-24 @ 04:40  7.47/55/73/40/95.9/13.8    ======  Complete Blood Count (11.22.24 @ 07:45)    Nucleated RBC: 0 /100 WBCs   WBC Count: 11.67 K/uL   RBC Count: 3.97 M/uL   Hemoglobin: 12.8 g/dL   Hematocrit: 35.9 %   Mean Cell Volume: 90.4 fl   Mean Cell Hemoglobin: 32.2 pg   Mean Cell Hemoglobin Conc: 35.7 g/dL   Red Cell Distrib Width: 13.0 %   Platelet Count - Automated: 203 K/uL    Comprehensive Metabolic Panel (11.22.24 @ 07:45)    Sodium: 130 mmol/L   Potassium: 3.9 mmol/L   Chloride: 92 mmol/L   Carbon Dioxide: 32 mmol/L   Anion Gap: 6 mmol/L   Blood Urea Nitrogen: 9 mg/dL   Creatinine: 0.58 mg/dL   Glucose: 99 mg/dL   Calcium: 9.0 mg/dL   Protein Total: 6.3 gm/dL   Albumin: 2.9 g/dL   Bilirubin Total: 0.5 mg/dL   Alkaline Phosphatase: 84 U/L   Aspartate Aminotransferase (AST/SGOT): 17 U/L   Alanine Aminotransferase (ALT/SGPT): 20 U/L      Respiratory Viral Panel with COVID-19 by JOHNSON (11.21.24 @ 19:00)    Rapid RVP Result: NotDete   SARS-CoV-2: NotDetec      RADIOLOGY  < from: Xray Chest 1 View- PORTABLE-Urgent (11.21.24 @ 15:24) >  Comparison is made to 10/17/2024.    The lungs are clear bilaterally. There are no infiltrates on either side.   There is no pneumothorax. There is no pleural effusion. There is no hilar   abnormality. There is a questionable hiatus hernia. The cardiac   silhouette is not enlarged. There is no CHF. The bony thorax remains   stable and includes degenerative changes of the thoracic spine.    IMPRESSION: Questionable hiatus hernia. Clear lungs with no acute   abnormalities.    --------------------  < from: CT Chest No Cont (11.21.24 @ 21:03) >  AIRWAYS AND LUNGS: Scattered bronchial secretions.  Emphysema. Patchy   bilateral lung opacities with a lower lobe predominance.    MEDIASTINUM AND PLEURA: There are no enlarged mediastinal, hilar or   axillary lymph nodes. The visualized portion of the thyroid gland is   heterogeneous. There is no pleural effusion. There is no pneumothorax.    HEART AND VESSELS: The heart is normal in size.  There are   atherosclerotic calcifications of the aorta and coronary arteries.  There   is no pericardial effusion.    UPPER ABDOMEN: Images of the upper abdomen demonstrate hiatal hernia with   debris distended distal esophagus    BONES AND SOFT TISSUES: The bones are unremarkable.  The soft tissues are   unremarkable.      IMPRESSION:  Multifocal pneumonia, question aspiration. Follow-up to resolution   recommended.    -----------------------  < from: CT Brain Stroke Protocol (11.21.24 @ 14:32) >  IMPRESSION:    CT HEAD:  1. No significant change.  2. No evidence of acute hemorrhage, territorial infarct or mass effect.  3. Additional findings as above, including paranasal sinus disease and   ventricular greater than sulcal prominence; the latter of which   statistically reflects central greater than cortical atrophy however can   be seen in the clinical setting of communicating hydrocephalus.  4. If clinical symptoms persist consider further imaging with MRI,   provided there are no medical contraindications.    CT PERFUSION:  1. No predicted core infarct.  2. No evidence of active ischemia-tissue at risk.    CTA NECK:  1. Bilateral vertebral arteries patent, with right-sided dominance.  2. No significant stenosis, occlusion or dissection bilateral   extracranial carotid arteries.  3. Heterogeneous density of multiple vertebral bodies; the latter of   which may be metabolic, degenerative or neoplastic in etiology. Recommend   initial further evaluation with nonemergent hematology   clinical-laboratory data evaluation.  4. Additional findings as above, including those described in the upper   lungs as well as fluid filled and patulous proximal esophagus, of   uncertain etiology and significance. Recommend nonemergent dedicated CT   chest and GI clinical evaluation.    CTA HEAD:  1. No proximal large vessel occlusion.  2. Additional findings described in detail above.    --------------------  < from: TTE Echo Complete w/o Contrast w/ Doppler (11.24.24 @ 10:32) >  CONCLUSIONS:     1. Left ventricular cavity is small. Left ventricular wall thickness is   mildly increased. There is normal LV mass and concentric remodeling. Left   ventricular systolic function is normal. There are no regional wall   motion abnormalities seen.   2. There is mild (grade 1) left ventricular diastolic dysfunction.   3. Grossly normal right ventricular cavity size and probably normal   right ventricular systolic function.   4. Normal left and right atrial size. Agitated saline injection was   negative for intracardiac shunt.   5. Trileaflet aortic valve with normal systolic excursion. There is   calcification of the aortic valve leaflets. No aortic valve stenosis. No   evidence of aorticregurgitation.   6. Thickened mitral valve leaflets with normal leaflet excursion. Trace   mitral regurgitation.   7. The aortic root appears normal in size.   8. No pericardial effusion seen.   9. Pulmonary artery systolic pressure could not be estimated.

## 2024-11-25 NOTE — PHYSICAL THERAPY INITIAL EVALUATION ADULT - PERTINENT HX OF CURRENT PROBLEM, REHAB EVAL
Patient is a 79M, with PMHx of HTN, HLD, COPD, schizophrenia, mild intellectual disability, who was brought in from group home for slurred speech and R facial droop. Admitted for TIA.

## 2024-11-25 NOTE — SWALLOW BEDSIDE ASSESSMENT ADULT - COMMENTS
MR HEAD 11/23.2024: "IMPRESSION: No MRI evidence of acute intracranial pathology."    CT CHEST 11/21/2024: "IMPRESSION: Multifocal pneumonia, question aspiration. Follow-up to resolution recommended."

## 2024-11-25 NOTE — PHYSICAL THERAPY INITIAL EVALUATION ADULT - GENERAL OBSERVATIONS, REHAB EVAL
Pt was seen sitting in bedside chair, alert and oriented to name and place and able to follow simple command. Caregiver, Tony present throughout P.T. intervention.

## 2024-11-25 NOTE — PROGRESS NOTE ADULT - PROBLEM SELECTOR PLAN 3
- CT head w/ Emphysematous change with multifocal scarring and/or atelectasis visualized right lung as well as partially imaged right greater than left peribronchial thickening; a nonspecific finding which may reflect acute or chronic infectious/inflammatory bronchitis.  CT neck:  VISUALIZED LUNGS: No suspicious mass. Emphysematous change with multifocal scarring and/or atelectasis visualized right lung as well as partially imaged right greater than left peribronchial thickening; a   nonspecific finding which may reflect acute or chronic infectious/inflammatory bronchitis.  Visualized esophagus appears patulous and fluid-filled.   Multifocal degenerative change, extensive in the cervical spine, with heterogeneous density of multiple vertebral bodies, including sclerosis and superimposed subcentimeter lucencies.  - CT chest : multilobar PNA? pt is afebrile  - pt was hypoxic, afebrile, no leucocytosis, today on RA  - Started Augmentin for aspiration PNA  - SLP, diet changed to pureed and thickened liquid
- CT head w/ Emphysematous change with multifocal scarring and/or atelectasis visualized right lung as well as partially imaged right greater than left peribronchial thickening; a nonspecific finding which may reflect acute or chronic infectious/inflammatory bronchitis.  - F/u RVP  - F/u CT chest
- CT head w/ Emphysematous change with multifocal scarring and/or atelectasis visualized right lung as well as partially imaged right greater than left peribronchial thickening; a nonspecific finding which may reflect acute or chronic infectious/inflammatory bronchitis.  CT neck:  VISUALIZED LUNGS: No suspicious mass. Emphysematous change with multifocal scarring and/or atelectasis visualized right lung as well as partially imaged right greater than left peribronchial thickening; a   nonspecific finding which may reflect acute or chronic infectious/inflammatory bronchitis.  Visualized esophagus appears patulous and fluid-filled.   Multifocal degenerative change, extensive in the cervical spine, with heterogeneous density of multiple vertebral bodies, including sclerosis and superimposed subcentimeter lucencies.  - CT chest : multilobar PNA? pt is afebrile, SO2 ok in RA  - pt is hypoxic, afebrile, no leucocytosis  - Start Augmentin for aspiration PNA  - SLP, diet changed to pureed and thickened liquid
- CT head w/ Emphysematous change with multifocal scarring and/or atelectasis visualized right lung as well as partially imaged right greater than left peribronchial thickening; a nonspecific finding which may reflect acute or chronic infectious/inflammatory bronchitis.  CT neck:  VISUALIZED LUNGS: No suspicious mass. Emphysematous change with multifocal scarring and/or atelectasis visualized right lung as well as partially imaged right greater than left peribronchial thickening; a   nonspecific finding which may reflect acute or chronic infectious/inflammatory bronchitis.  Visualized esophagus appears patulous and fluid-filled.   Multifocal degenerative change, extensive in the cervical spine, with heterogeneous density of multiple vertebral bodies, including sclerosis and superimposed subcentimeter lucencies.  - CT chest : multilobar PNA? pt is afebrile, SO2 ok in RA  - pt is hypoxic, afebrile, no leucocytosis  - Start Augmentin for aspiration PNA  - SLP, diet changed to pureed and thickened liquid

## 2024-11-25 NOTE — PROGRESS NOTE ADULT - PROBLEM SELECTOR PLAN 1
- P/w slurred speech and R facial droop, resolved   - CT head = No acute pathologies.  - CTA head & neck = No proximal large vessel occlusion.  - CTA perfusion = No predicted core infarct. No evidence of active ischemia-tissue at risk.  - MR neg  - EKG = NSR  - Dysphagia Screen passed, but noticed sign of aspiration in CT and while drinking liquid, SLP  pending   - Started Aspirin 81mg, Plavix, and Atorvastatin 20mg  - Tele with no event.  - Fall precautions  - Echo with no acute finding
- P/w slurred speech and R facial droop, that is no longer present  - CT head = No acute pathologies.  - CTA head&neck = No proximal large vessel occlusion.  - CTA perfusion = No predicted core infarct. No evidence of active ischemia-tissue at risk.  - EKG = NSR  - Dysphagia Screen passed  - Started Aspirin 81mg, Plavix, and Atorvastatin 20mg  - Admit to telemetry  - Fall precautions  - Neuro check q4hrs  - Permissive hypertension  - F/u A1c and lipid panel  - F/u Echo w/ bubble study  - F/u PT evaluation  - F/u MR brain
- P/w slurred speech and R facial droop, that is no longer present  - CT head = No acute pathologies.  - CTA head&neck = No proximal large vessel occlusion.  - CTA perfusion = No predicted core infarct. No evidence of active ischemia-tissue at risk.  - MR neg  - EKG = NSR  - Dysphagia Screen passed, but noticed sign of aspiration in CT and while drinking liquid  - Started Aspirin 81mg, Plavix, and Atorvastatin 20mg  - Tele  - Fall precautions  - F/u Echo w/ bubble study  - F/u PT evaluation
- P/w slurred speech and R facial droop, resolved   - CT head = No acute pathologies.  - CTA head&neck = No proximal large vessel occlusion.  - CTA perfusion = No predicted core infarct. No evidence of active ischemia-tissue at risk.  - MR neg  - EKG = NSR  - Dysphagia Screen passed, but noticed sign of aspiration in CT and while drinking liquid  - Started Aspirin 81mg, Plavix, and Atorvastatin 20mg  - Tele  - Fall precautions  - Echo with no acute finding

## 2024-11-25 NOTE — CONSULT NOTE ADULT - NS ATTEND AMEND GEN_ALL_CORE FT
pt seen and examined at bedside with PA    on RA  pt's group home companion at bedside attempting to feed pt puree food but pt refusing to eat  underlying intellectual delay but answers simple questions  denies SOB  pt states "I'm Rastafarian and I'm fasting"  pt reports he knows Buddy Duffy and that [place] has told him many things  no distress noted during time of exam  no cough noted     states pt was former smoker - likes to hold lollipop in mouth as it helps simulate having a cigarette in his mouth    78M ex smoker from group home PMH HTN, HLD, COPD, schizophrenia, mild intellectual disability, R leg cellulitis 2022, recent admission 10/2024 for AMS in setting of hyponatremia which corrected with IVF presents from group home for facial droop and slurred speech. Admitted for TIA. Found on CT chest to have multifocal PNA. Blood gas with noted pCO2 elevation 7.47/55/73/40/96%. Pulmonary consulted for hypercarbia, hypoxia.    DX: chronic hypercarbic respiratory failure, TIA, (aspiration) PNA, COPD, intellectual disability, schizophrenia    - blood gas reviewed  - pt with chronic hypercarbia / chronic CO2 retention likely in setting of being a formed smoker with underlying COPD  - most recent pH 7.47 (slightly alkalotic)  - he does not acutely need NIV/biPAP  - question if he will be compliant with NIV mask use with his underling schizophrenia and intellectual disability  - he is currently refusing spoon to be placed near his mouth by his  as he "is Rastafarian and needs to fast":  that knows him reports pt when he does not want to eat will state the above  - can observe for now  - repeat VBG in AM  - currently on room air with O2 sat > 90%  - on antibx augmentin for PNA possibly aspiration: pending speech and swallow eval  - check strep, mycoplasma, legionella, MRSA swab  - RVP negative  - COPD: no respiratory distress, can give duonebs prn, on advair which pt can cont  - neuro work up thus far negative  - remainder of care per primary team

## 2024-11-25 NOTE — PHYSICAL THERAPY INITIAL EVALUATION ADULT - ADDITIONAL COMMENTS
As per pt's caregiver, Tony: There are some steps, c one rail to negotiate at pt's home environment. Pt always get assist for the stair negotiation prior to admission. Pt was able to ambulate c a Rolling Walker under supervision prior to admission.

## 2024-11-25 NOTE — SWALLOW BEDSIDE ASSESSMENT ADULT - SWALLOW EVAL: DIAGNOSIS
Pt demonstrated oral stage dysphagia marked by adequate oral grading/stripping of bolus from utensil, delayed bolus collection, increased mastication time/decreased mastication quality leading to expectoration of minced and moist consistency bolus, delayed posterior transport. Pharyngeal skills characterized by initiation of pharyngeal swallow trigger with present hyolaryngeal elevation noted upon digital palpation without evidence of airway penetration. Pt with decreased cooperation/acceptance of bolus therefore trials limited.

## 2024-11-25 NOTE — PROGRESS NOTE ADULT - SUBJECTIVE AND OBJECTIVE BOX
Patient is a 79y old  Male who presents with a chief complaint of TIA (24 Nov 2024 15:37)      INTERVAL HPI/OVERNIGHT EVENTS:  Pt was seen and examined, no acute events.      MEDICATIONS  (STANDING):  amoxicillin  875 milliGRAM(s)/clavulanate 1 Tablet(s) Oral two times a day  aspirin enteric coated 81 milliGRAM(s) Oral daily  atorvastatin 20 milliGRAM(s) Oral at bedtime  citalopram 20 milliGRAM(s) Oral daily  clopidogrel Tablet 75 milliGRAM(s) Oral daily  divalproex  milliGRAM(s) Oral <User Schedule>  enoxaparin Injectable 40 milliGRAM(s) SubCutaneous every 24 hours  fluticasone propionate/ salmeterol 100-50 MICROgram(s) Diskus 1 Dose(s) Inhalation two times a day  lactulose Syrup 10 Gram(s) Oral daily  OLANZapine 10 milliGRAM(s) Oral daily  senna 2 Tablet(s) Oral at bedtime  tamsulosin 0.4 milliGRAM(s) Oral at bedtime    MEDICATIONS  (PRN):  acetaminophen     Tablet .. 650 milliGRAM(s) Oral every 6 hours PRN Temp greater or equal to 38C (100.4F), Mild Pain (1 - 3)  albuterol    90 MICROgram(s) HFA Inhaler 2 Puff(s) Inhalation every 6 hours PRN Shortness of Breath and/or Wheezing  aluminum hydroxide/magnesium hydroxide/simethicone Suspension 30 milliLiter(s) Oral every 4 hours PRN Dyspepsia  melatonin 3 milliGRAM(s) Oral at bedtime PRN Insomnia      Allergies    iodine (Unknown)  betadine (Unknown)    Intolerances          Vital Signs Last 24 Hrs  T(C): 36.6 (25 Nov 2024 11:19), Max: 37.5 (24 Nov 2024 23:10)  T(F): 97.9 (25 Nov 2024 11:19), Max: 99.5 (24 Nov 2024 23:10)  HR: 93 (25 Nov 2024 11:19) (91 - 104)  BP: 126/81 (25 Nov 2024 11:19) (123/80 - 146/86)  BP(mean): 103 (24 Nov 2024 16:09) (103 - 103)  RR: 18 (25 Nov 2024 11:19) (18 - 18)  SpO2: 91% (25 Nov 2024 11:19) (90% - 97%)    Parameters below as of 25 Nov 2024 11:19  Patient On (Oxygen Delivery Method): room air        PHYSICAL EXAM:  GENERAL: NAD  HEAD:  Atraumatic  EYES: PERRLA  ENMT: Mouth moist   NECK: Supple  NERVOUS SYSTEM:  Awake, alert  CHEST/LUNG: Clear  HEART: RRR, S1, S2  ABDOMEN: Soft, non tender  EXTREMITIES: no edema BL LE   SKIN: No rash        LABS:        CAPILLARY BLOOD GLUCOSE          Urinalysis with Rflx Culture (collected 21 Nov 2024 18:30)      RADIOLOGY & ADDITIONAL TESTS:    Imaging Personally Reviewed:  [ ] YES  [ ] NO    Consultant(s) Notes Reviewed:  [ ] YES  [ ] NO    Care Discussed with Consultants/Other Providers [ ] YES  [ ] NO

## 2024-11-25 NOTE — SWALLOW BEDSIDE ASSESSMENT ADULT - SLP GENERAL OBSERVATIONS
Consult received and chart reviewed. The patient was seen at bedside for clinical bedside swallow evaluation, at which time he was awake/alert and confused. The patient did not follow simple directives or answer questions. Pt's aid present, reported pt eats "very chopped" food and coughs with liquids.

## 2024-11-25 NOTE — PROGRESS NOTE ADULT - PROBLEM SELECTOR PLAN 5
- Hold home HTN meds for now for permissive HTN  - BP acceptable
Request for additional skilled visits for wk of 8/4 and 8/11.  Forwarded to physician for review  
- Hold home HTN meds for now for permissive HTN
- Hold home HTN meds for now for permissive HTN  - BP acceptable
- Hold home HTN meds for now for permissive HTN

## 2024-11-25 NOTE — SWALLOW BEDSIDE ASSESSMENT ADULT - SLP PERTINENT HISTORY OF CURRENT PROBLEM
Per charting, "Patient is a 79M, with PMHx of HTN, HLD, COPD, schizophrenia, mild intellectual disability, who was brought in from group home for slurred speech and R facial droop. Admitted for TIA."

## 2024-11-25 NOTE — PROGRESS NOTE ADULT - PROBLEM SELECTOR PLAN 4
- C/w home inhalers  - On room air saturating 98%  - Not wheezing  - CXR = Clear lungs with no acute abnormalities.  - ABG noted, unclear if pt will tolerate BIPAP
- C/w home inhalers  - On RA today  - Not wheezing  - CXR = Clear lungs with no acute abnormalities.  - ABG noted, unclear if pt will tolerate BIPAP  - Pulm consulted
- C/w home inhalers  - On room air saturating 98%  - Not wheezing  - CXR = Clear lungs with no acute abnormalities.  - Check ABG in AM
- C/w home inhalers  - On room air saturating 98%  - Not wheezing  - CXR = Clear lungs with no acute abnormalities.  CT neck:  VISUALIZED LUNGS: No suspicious mass. Emphysematous change with multifocal scarring and/or atelectasis visualized right lung as well as partially imaged right greater than left peribronchial thickening; a   nonspecific finding which may reflect acute or chronic infectious/inflammatory bronchitis.  Visualized esophagus appears patulous and fluid-filled.   Multifocal degenerative change, extensive in the cervical spine, with heterogeneous density of multiple vertebral bodies, including sclerosis and superimposed subcentimeter lucencies.  - CT chest : multilobar PNA? pt is afebrile, SO2 ok in RA  - Will check SLP for possible aspiration   Hold off abx now

## 2024-11-25 NOTE — PHYSICAL THERAPY INITIAL EVALUATION ADULT - GAIT TRAINING, PT EVAL
Pt will ambulate 200 feet with rolling walker, and negotiate 14 steps under supervision, without loss of balance, by 2 weeks.

## 2024-11-25 NOTE — PROGRESS NOTE ADULT - NUTRITIONAL ASSESSMENT
This patient has been assessed with a concern for Malnutrition and has been determined to have a diagnosis/diagnoses of Moderate protein-calorie malnutrition.    This patient is being managed with:   Diet Pureed-  Mildly Thick Liquids (MILDTHICKLIQS)  Entered: Nov 23 2024  3:03PM    The following pending diet order is being considered for treatment of Moderate protein-calorie malnutrition:  Diet Pureed-  Mildly Thick Liquids (MILDTHICKLIQS)  Supplement Feeding Modality:  Oral  Health Shake Cans or Servings Per Day:  1       Frequency:  Two Times a day  Entered: Nov 24 2024 12:24PM  
This patient has been assessed with a concern for Malnutrition and has been determined to have a diagnosis/diagnoses of Moderate protein-calorie malnutrition.    This patient is being managed with:   Diet Pureed-  Mildly Thick Liquids (MILDTHICKLIQS)  Supplement Feeding Modality:  Oral  Health Shake Cans or Servings Per Day:  1       Frequency:  Two Times a day  Entered: Nov 24 2024 12:24PM

## 2024-11-26 VITALS
RESPIRATION RATE: 18 BRPM | HEART RATE: 83 BPM | SYSTOLIC BLOOD PRESSURE: 135 MMHG | TEMPERATURE: 98 F | OXYGEN SATURATION: 91 % | DIASTOLIC BLOOD PRESSURE: 77 MMHG

## 2024-11-26 PROCEDURE — 99239 HOSP IP/OBS DSCHRG MGMT >30: CPT

## 2024-11-26 PROCEDURE — 99233 SBSQ HOSP IP/OBS HIGH 50: CPT

## 2024-11-26 RX ORDER — DILTIAZEM HYDROCHLORIDE 240 MG/1
1 CAPSULE, COATED, EXTENDED RELEASE ORAL
Qty: 0 | Refills: 0 | DISCHARGE

## 2024-11-26 RX ORDER — AMOXICILLIN/POTASSIUM CLAV 250-125 MG
1 TABLET ORAL
Qty: 4 | Refills: 0
Start: 2024-11-26 | End: 2024-11-27

## 2024-11-26 RX ORDER — HYDROCHLOROTHIAZIDE 50 MG
1 TABLET ORAL
Refills: 0 | DISCHARGE

## 2024-11-26 RX ADMIN — Medication 81 MILLIGRAM(S): at 11:46

## 2024-11-26 RX ADMIN — Medication 1 TABLET(S): at 05:17

## 2024-11-26 RX ADMIN — OLANZAPINE 10 MILLIGRAM(S): 20 TABLET ORAL at 11:46

## 2024-11-26 RX ADMIN — Medication 500 MILLIGRAM(S): at 09:17

## 2024-11-26 RX ADMIN — CLOPIDOGREL 75 MILLIGRAM(S): 75 TABLET, FILM COATED ORAL at 11:46

## 2024-11-26 RX ADMIN — Medication 20 MILLIGRAM(S): at 11:46

## 2024-11-26 NOTE — DISCHARGE NOTE NURSING/CASE MANAGEMENT/SOCIAL WORK - PATIENT PORTAL LINK FT
You can access the FollowMyHealth Patient Portal offered by [facility] by registering at the following website: [email or web address]. By joining [employer]’s FollowMyHealth portal, you will also be able to view your health information using other applications (apps) compatible with our system.
- - -

## 2024-11-26 NOTE — DISCHARGE NOTE NURSING/CASE MANAGEMENT/SOCIAL WORK - NSDCVIVACCINE_GEN_ALL_CORE_FT
Tdap; 15-Jul-2023 23:06; Bessy Whitaker (RN); [employer]; [id number] (Exp. Date: 19-Sep-2025); IntraMuscular; Deltoid Left.; 0.5 milliLiter(s); VIS (VIS Published: 09-May-2013, VIS Presented: 15-Jul-2023);

## 2024-11-26 NOTE — DISCHARGE NOTE PROVIDER - CARE PROVIDER_API CALL
Pulmonologist,   Phone: (   )    -  Fax: (   )    -  Follow Up Time: 2 weeks    PCP,   Phone: (   )    -  Fax: (   )    -  Follow Up Time: 1 week   Pulmonologist,   Phone: (   )    -  Fax: (   )    -  Follow Up Time: 2 weeks    PCP,   Phone: (   )    -  Fax: (   )    -  Follow Up Time: 1 week    mica,   Phone: (   )    -  Fax: (   )    -  Follow Up Time:

## 2024-11-26 NOTE — DISCHARGE NOTE NURSING/CASE MANAGEMENT/SOCIAL WORK - NSDCFUADDAPPT_GEN_ALL_CORE_FT
It is important to see your primary physician as well as the physicians noted below within the next week to perform a comprehensive medical review.  Call their offices for an appointment as soon as you leave the hospital.  You will also need to see them for renewal of your medications.  If you do not have a primary physician, contact the [facility] Physician Referral Service at [phone].  To obtain your results, you can access the Follow"[employer] Patient Portal at [email or web address].  Your medical issues appear to be stable at this time, but if your symptoms recur or worsen, contact your physicians and/or return to the hospital if necessary.  If you encounter any issues or questions with your medication, call your physicians before stopping the medication.

## 2024-11-26 NOTE — DISCHARGE NOTE PROVIDER - PROVIDER TOKENS
FREE:[LAST:[Pulmonologist],PHONE:[(   )    -],FAX:[(   )    -],FOLLOWUP:[2 weeks]],FREE:[LAST:[PCP],PHONE:[(   )    -],FAX:[(   )    -],FOLLOWUP:[1 week]] FREE:[LAST:[Pulmonologist],PHONE:[(   )    -],FAX:[(   )    -],FOLLOWUP:[2 weeks]],FREE:[LAST:[PCP],PHONE:[(   )    -],FAX:[(   )    -],FOLLOWUP:[1 week]],FREE:[LAST:[mica],PHONE:[(   )    -],FAX:[(   )    -]]

## 2024-11-26 NOTE — DISCHARGE NOTE NURSING/CASE MANAGEMENT/SOCIAL WORK - FINANCIAL ASSISTANCE
[facility] provides services at a reduced cost to those who are determined to be eligible through [facility]’s financial assistance program. Information regarding [facility]’s financial assistance program can be found by going to [email or web address] or by calling [phone].

## 2024-11-26 NOTE — PROGRESS NOTE ADULT - SUBJECTIVE AND OBJECTIVE BOX
Interval Events: NAEO. remains on RA.     REVIEW OF SYSTEMS:  all negative systems HPI     OBJECTIVE:  ICU Vital Signs Last 24 Hrs  T(C): 36.4 (26 Nov 2024 04:38), Max: 36.6 (25 Nov 2024 11:19)  T(F): 97.6 (26 Nov 2024 04:38), Max: 97.9 (25 Nov 2024 11:19)  HR: 81 (26 Nov 2024 04:38) (61 - 93)  BP: 139/88 (26 Nov 2024 04:38) (126/81 - 160/88)  BP(mean): --  ABP: --  ABP(mean): --  RR: 18 (26 Nov 2024 04:38) (18 - 18)  SpO2: 92% (26 Nov 2024 04:38) (91% - 92%)    O2 Parameters below as of 25 Nov 2024 11:19  Patient On (Oxygen Delivery Method): room air              11-25 @ 07:01  -  11-26 @ 07:00  --------------------------------------------------------  IN: 240 mL / OUT: 0 mL / NET: 240 mL      CAPILLARY BLOOD GLUCOSE          PHYSICAL EXAM:  GENERAL: NAD, lying in bed comfortably  HEAD:  Atraumatic, normocephalic  EYES: EOMI, PERRLA, conjunctiva and sclera clear  NECK: Supple, trachea midline, no JVD  HEART: Regular rate and rhythm, no murmurs, rubs, or gallops  LUNGS: Unlabored respirations.  Clear to auscultation bilaterally, no crackles, wheezing, or rhonchi  ABDOMEN: Soft, nontender, nondistended, +BS  EXTREMITIES: 2+ peripheral pulses bilaterally. No clubbing, cyanosis, or edema  NERVOUS SYSTEM:  A&Ox3, moving all extremities, no focal deficits   SKIN: No rashes or lesions    HOSPITAL MEDICATIONS:  aspirin enteric coated 81 milliGRAM(s) Oral daily  clopidogrel Tablet 75 milliGRAM(s) Oral daily  enoxaparin Injectable 40 milliGRAM(s) SubCutaneous every 24 hours    amoxicillin  875 milliGRAM(s)/clavulanate 1 Tablet(s) Oral two times a day      atorvastatin 20 milliGRAM(s) Oral at bedtime    albuterol    90 MICROgram(s) HFA Inhaler 2 Puff(s) Inhalation every 6 hours PRN  fluticasone propionate/ salmeterol 100-50 MICROgram(s) Diskus 1 Dose(s) Inhalation two times a day    acetaminophen     Tablet .. 650 milliGRAM(s) Oral every 6 hours PRN  citalopram 20 milliGRAM(s) Oral daily  divalproex  milliGRAM(s) Oral <User Schedule>  melatonin 3 milliGRAM(s) Oral at bedtime PRN  OLANZapine 10 milliGRAM(s) Oral daily    aluminum hydroxide/magnesium hydroxide/simethicone Suspension 30 milliLiter(s) Oral every 4 hours PRN  lactulose Syrup 10 Gram(s) Oral daily  senna 2 Tablet(s) Oral at bedtime      tamsulosin 0.4 milliGRAM(s) Oral at bedtime              LABS:    Hgb Trend: 12.8<--, 12.4<--        Creatinine Trend: 0.58<--, 0.46<--                 Interval Events: NAEO. remains on RA with spo2 95%. no pulmonary symptoms at this time.     REVIEW OF SYSTEMS:  all negative systems HPI     OBJECTIVE:  ICU Vital Signs Last 24 Hrs  T(C): 36.4 (26 Nov 2024 04:38), Max: 36.6 (25 Nov 2024 11:19)  T(F): 97.6 (26 Nov 2024 04:38), Max: 97.9 (25 Nov 2024 11:19)  HR: 81 (26 Nov 2024 04:38) (61 - 93)  BP: 139/88 (26 Nov 2024 04:38) (126/81 - 160/88)  BP(mean): --  ABP: --  ABP(mean): --  RR: 18 (26 Nov 2024 04:38) (18 - 18)  SpO2: 92% (26 Nov 2024 04:38) (91% - 92%)    O2 Parameters below as of 25 Nov 2024 11:19  Patient On (Oxygen Delivery Method): room air              11-25 @ 07:01  -  11-26 @ 07:00  --------------------------------------------------------  IN: 240 mL / OUT: 0 mL / NET: 240 mL      CAPILLARY BLOOD GLUCOSE          PHYSICAL EXAM:  GENERAL: NAD, lying in bed comfortably  HEAD:  Atraumatic, normocephalic  EYES: EOMI, PERRLA, conjunctiva and sclera clear  NECK: Supple, trachea midline, no JVD  HEART: Regular rate and rhythm, no murmurs, rubs, or gallops  LUNGS: Unlabored respirations.  Clear to auscultation bilaterally, no crackles, wheezing, or rhonchi  ABDOMEN: Soft, nontender, nondistended, +BS  EXTREMITIES: 2+ peripheral pulses bilaterally. No clubbing, cyanosis, or edema  NERVOUS SYSTEM:  A&Ox3, moving all extremities, no focal deficits   SKIN: No rashes or lesions    HOSPITAL MEDICATIONS:  aspirin enteric coated 81 milliGRAM(s) Oral daily  clopidogrel Tablet 75 milliGRAM(s) Oral daily  enoxaparin Injectable 40 milliGRAM(s) SubCutaneous every 24 hours    amoxicillin  875 milliGRAM(s)/clavulanate 1 Tablet(s) Oral two times a day      atorvastatin 20 milliGRAM(s) Oral at bedtime    albuterol    90 MICROgram(s) HFA Inhaler 2 Puff(s) Inhalation every 6 hours PRN  fluticasone propionate/ salmeterol 100-50 MICROgram(s) Diskus 1 Dose(s) Inhalation two times a day    acetaminophen     Tablet .. 650 milliGRAM(s) Oral every 6 hours PRN  citalopram 20 milliGRAM(s) Oral daily  divalproex  milliGRAM(s) Oral <User Schedule>  melatonin 3 milliGRAM(s) Oral at bedtime PRN  OLANZapine 10 milliGRAM(s) Oral daily    aluminum hydroxide/magnesium hydroxide/simethicone Suspension 30 milliLiter(s) Oral every 4 hours PRN  lactulose Syrup 10 Gram(s) Oral daily  senna 2 Tablet(s) Oral at bedtime      tamsulosin 0.4 milliGRAM(s) Oral at bedtime              LABS:    Hgb Trend: 12.8<--, 12.4<--        Creatinine Trend: 0.58<--, 0.46<--

## 2024-11-26 NOTE — PROGRESS NOTE ADULT - NS ATTEND AMEND GEN_ALL_CORE FT
79-year-old male with schizophrenia, COPD, electro disability and hypertension initially presenting with concerns for CVA with associated right facial droop and slurred speech.  Neurological findings now resolved.  Pulmonary consulted for concern for possible aspiration pneumonia.  Patient is awake alert and saturating appropriately on room air.  No rhonchi or crackles on exam and no complaint of cough.  Patient does not have any clinical signs of pneumonia at this time.    Plan  – Continue with Advair for patient's COPD  – Patient previously started on Augmentin for concern for aspiration pneumonia okay with completing for a 5-day course maintain SaO2 greater than 90%  – Can continue with albuterol 2 puffs every 6 hours as needed for shortness of breath and/or wheezing  – Out of bed to chair  – Continue with puréed diet and mildly thick liquids per speech and swallow evaluation  – Rest of care per primary team    Pulmonary to sign off.  Please call back with any questions

## 2024-11-26 NOTE — DISCHARGE NOTE PROVIDER - ATTENDING DISCHARGE PHYSICAL EXAMINATION:
Vital Signs Last 24 Hrs  T(C): 36.7 (26 Nov 2024 10:50), Max: 36.7 (26 Nov 2024 10:50)  T(F): 98 (26 Nov 2024 10:50), Max: 98 (26 Nov 2024 10:50)  HR: 83 (26 Nov 2024 10:50) (61 - 83)  BP: 135/77 (26 Nov 2024 10:50) (135/77 - 160/88)  BP(mean): --  RR: 18 (26 Nov 2024 10:50) (18 - 18)  SpO2: 91% (26 Nov 2024 10:50) (91% - 92%)    Parameters below as of 26 Nov 2024 10:50  Patient On (Oxygen Delivery Method): room air    GENERAL: NAD  HEAD:  Atraumatic  EYES: PERRLA  ENMT: Mouth moist   NECK: Supple  NERVOUS SYSTEM:  Awake, alert  CHEST/LUNG: Clear  HEART: RRR, S1, S2  ABDOMEN: Soft, non tender  EXTREMITIES: no edema BL LE   SKIN: No rash

## 2024-11-26 NOTE — DISCHARGE NOTE PROVIDER - HOSPITAL COURSE
Patient is a 79M, with PMHx of HTN, HLD, COPD, schizophrenia, mild intellectual disability, who was brought in from group home as code stroke due to R facial droop and slurred speech at home. Sx resolved upon arrival to ED,  CT head = No acute pathologies, CTA head&neck = No proximal large vessel occlusion, CTA perfusion = No predicted core infarct. Labs showing mild hypercapnia on ABG, hyponatremia, normal lactate/troponin, CXR with Clear lungs with no acute pathology. Admitted for stroke work up vs seizure, with EEG showing No epileptiform pattern or seizure, MRI showing no evidence of acute intracranial pathology. Hospital course with concern for dysphagia and witnessed aspiration event, with pt temporarily requiring oxygen. Evaluated by Pulmonology, with no acute respiratory concerns and repeat ABG consistent with chronic compensated hypercapnia. Pt without further neurologic events or deficits, saturating well on RA, afebrile, HD stable, and passed formal Speech and swallow eval. Pt medically stable for DC with outpatient routine pulmonology and nephrology follow up.      Problem/Plan - 1:  ·  Problem: TIA (transient ischemic attack).   ·  Plan: - P/w slurred speech and R facial droop, resolved   - CT head = No acute pathologies.  - CTA head & neck = No proximal large vessel occlusion.  - CTA perfusion = No predicted core infarct. No evidence of active ischemia-tissue at risk.  - MR neg  - EKG = NSR  - Dysphagia Screen passed, but noticed sign of aspiration in CT and while drinking liquid, SLP  pending   - Started Aspirin 81mg, Plavix, and Atorvastatin 20mg  - Tele with no event.  - Fall precautions  - Echo with no acute finding.     Problem/Plan - 2:  ·  Problem: Hyponatremia.   ·  Plan: - Has hx of hypovolemic hyponatremia  - ACTH, AM cortisol wnl last admission a month ago  - Na stable.     Problem/Plan - 3:  ·  Problem: Abnormal CT scan.   ·  Plan: - CT head w/ Emphysematous change with multifocal scarring and/or atelectasis visualized right lung as well as partially imaged right greater than left peribronchial thickening; a nonspecific finding which may reflect acute or chronic infectious/inflammatory bronchitis.  CT neck:  VISUALIZED LUNGS: No suspicious mass. Emphysematous change with multifocal scarring and/or atelectasis visualized right lung as well as partially imaged right greater than left peribronchial thickening; a   nonspecific finding which may reflect acute or chronic infectious/inflammatory bronchitis.  Visualized esophagus appears patulous and fluid-filled.   Multifocal degenerative change, extensive in the cervical spine, with heterogeneous density of multiple vertebral bodies, including sclerosis and superimposed subcentimeter lucencies.  - CT chest : multilobar PNA? pt is afebrile  - pt was hypoxic, afebrile, no leucocytosis, today on RA  - Started Augmentin for aspiration PNA  - SLP, diet changed to pureed and thickened liquid.     Problem/Plan - 4:  ·  Problem: Chronic obstructive pulmonary disease (COPD).   ·  Plan: - C/w home inhalers  - On RA today  - Not wheezing  - CXR = Clear lungs with no acute abnormalities.  - ABG noted, unclear if pt will tolerate BIPAP  - Pulm consulted.     Problem/Plan - 5:  ·  Problem: HTN (hypertension).   ·  Plan: - Hold home HTN meds for now for permissive HTN  - BP acceptable.     Problem/Plan - 6:  ·  Problem: Schizophrenia.   ·  Plan: - C/w home meds.     Problem/Plan - 7:  ·  Problem: HLD (hyperlipidemia).   ·  Plan: - C/w home atorvastatin.     Problem/Plan - 8:  ·  Problem: Prophylactic measure.   ·  Plan: SQ Lovenox.   Patient is a 79M, with PMHx of HTN, HLD, COPD, schizophrenia, mild intellectual disability, who was brought in from group home as code stroke due to R facial droop and slurred speech at home. Sx resolved upon arrival to ED,  CT head = No acute pathologies, CTA head&neck = No proximal large vessel occlusion, CTA perfusion = No predicted core infarct. Labs showing mild hypercapnia on ABG, hyponatremia, normal lactate/troponin, CXR with Clear lungs with no acute pathology. Admitted for stroke work up vs seizure, with EEG showing No epileptiform pattern or seizure, MRI showing no evidence of acute intracranial pathology. Hospital course with concern for dysphagia and witnessed aspiration event, with pt temporarily requiring oxygen. Evaluated by Pulmonology, with no acute respiratory concerns and repeat ABG consistent with chronic compensated hypercapnia. Pt without further neurologic events or deficits, saturating well on RA, afebrile, HD stable, and passed formal Speech and swallow eval. Pt medically stable for DC with outpatient routine pulmonology and nephrology follow up.      Problem/Plan - 1:  ·  Problem: TIA (transient ischemic attack).   ·  Plan: - P/w slurred speech and R facial droop, resolved   - CT head = No acute pathologies.  - CTA head & neck = No proximal large vessel occlusion.  - CTA perfusion = No predicted core infarct. No evidence of active ischemia-tissue at risk.  - MR neg  - EKG = NSR  - Dysphagia Screen passed, but noticed sign of aspiration in CT and while drinking liquid, SLP  pending   - Started Aspirin 81mg, Plavix, and Atorvastatin 20mg  - Tele with no event.  - Fall precautions  - Echo with no acute finding.     Problem/Plan - 2:  ·  Problem: Hyponatremia.   ·  Plan: - Has hx of hypovolemic hyponatremia  - ACTH, AM cortisol wnl last admission a month ago  - Na stable.     Problem/Plan - 3:  ·  Problem: Abnormal CT scan.   ·  Plan: - CT head w/ Emphysematous change with multifocal scarring and/or atelectasis visualized right lung as well as partially imaged right greater than left peribronchial thickening; a nonspecific finding which may reflect acute or chronic infectious/inflammatory bronchitis.  CT neck:  VISUALIZED LUNGS: No suspicious mass. Emphysematous change with multifocal scarring and/or atelectasis visualized right lung as well as partially imaged right greater than left peribronchial thickening; a   nonspecific finding which may reflect acute or chronic infectious/inflammatory bronchitis.  Visualized esophagus appears patulous and fluid-filled.   Multifocal degenerative change, extensive in the cervical spine, with heterogeneous density of multiple vertebral bodies, including sclerosis and superimposed subcentimeter lucencies.  - CT chest : multilobar PNA? pt is afebrile  - pt was hypoxic, afebrile, no leucocytosis, today on RA  - Started Augmentin for aspiration PNA  - SLP, diet changed to pureed and thickened liquid.     Problem/Plan - 4:  ·  Problem: Chronic obstructive pulmonary disease (COPD).   ·  Plan: - C/w home inhalers  - On RA   - Not wheezing  - CXR = Clear lungs with no acute abnormalities.  - ABG noted, unclear if pt will tolerate BIPAP  - Pulm consulted.     Problem/Plan - 5:  ·  Problem: HTN (hypertension).   ·  Plan: - Hold home HTN meds for now for permissive HTN  - BP acceptable.     Problem/Plan - 6:  ·  Problem: Schizophrenia.   ·  Plan: - C/w home meds.     Problem/Plan - 7:  ·  Problem: HLD (hyperlipidemia).   ·  Plan: - C/w home atorvastatin.    ABG with primary met Alk and secondary to resp acidosis:  - K ok, unknown etiology of met alk, chronic, outpt renal follow up

## 2024-11-26 NOTE — DISCHARGE NOTE PROVIDER - NSDCCPCAREPLAN_GEN_ALL_CORE_FT
PRINCIPAL DISCHARGE DIAGNOSIS  Diagnosis: Slurred speech  Assessment and Plan of Treatment: What is a transient ischemic attack?  A transient ischemic attack ("TIA") is when blood flow in the brain is blocked for a short time. This causes a person to have temporary stroke-like symptoms.  TIAs happen when an artery in the brain gets clogged, or closes off, and then reopens on its own. This can happen if a blood clot forms and then moves away or dissolves.  A person who has had a TIA is at higher risk of having a stroke.  What is the difference between TIA and stroke?  A TIA does not cause permanent damage to the brain like a stroke does. But the symptoms are the same. This can make it hard to tell if a person is having a TIA or a stroke.  With a stroke, the symptoms are long-lasting, while a TIA goes away quickly.  What causes a TIA?  Just like a stroke, a TIA can happen when the blood supply to part of the brain is cut off for a short time. This can happen if a blood clot blocks the flow of blood through an artery in the brain and then dissolves or moves away. It can also happen if 1 of the small arteries in the brain begins to close off from the effects of high blood pressure.  How is a TIA treated?  TIAs are not usually treated directly. Instead, treatments are directed at reducing the risk that a person will go on to have a full-blown stroke. To lower your risk of stroke, you should:  ?Take your medicines exactly as directed. Medicines that are especially important in preventing strokes include:  •Medicines to lower blood pressure  •Medicines called statins, which lower cholesterol  •Medicines to prevent blood clots, such as aspirin or blood thinners  •Medicines that help keep your blood sugar as close to normal as possible (if you have diabetes)  ?Make lifestyle changes:  •Stop smoking, if you smoke.  •Get regular exercise (if your doctor says that it's safe) for at least 30 minutes a day on most days of the week.  •Lose weight, if you are overweight.  •Eat a diet rich in fruits, vegetables, and low-fat dairy products and low in meats, sweets, and refi     PRINCIPAL DISCHARGE DIAGNOSIS  Diagnosis: Slurred speech  Assessment and Plan of Treatment: Problem/Plan - 1:  ·  Problem: TIA (transient ischemic attack).   ·  Plan: - P/w slurred speech and R facial droop, resolved   - CT head = No acute pathologies.  - CTA head & neck = No proximal large vessel occlusion.  - CTA perfusion = No predicted core infarct. No evidence of active ischemia-tissue at risk.  - MR neg  - EKG = NSR  - Dysphagia Screen passed, but noticed sign of aspiration in CT and while drinking liquid, SLP  pending   - Started Aspirin 81mg, Plavix, and Atorvastatin 20mg  - Tele with no event.  - Echo with no acute finding.   Problem/Plan - 2:  ·  Problem: Hyponatremia.   - Na stable.   Problem/Plan - 3:  ·  Problem: Abnormal CT scan.   ·  Plan: -  VISUALIZED LUNGS: No suspicious mass. Emphysematous change with multifocal scarring and/or atelectasis visualized right lung as well as partially imaged right greater than left peribronchial thickening; a   nonspecific finding which may reflect acute or chronic infectious/inflammatory bronchitis.  Visualized esophagus appears patulous and fluid-filled.   Multifocal degenerative change, extensive in the cervical spine, with heterogeneous density of multiple vertebral bodies, including sclerosis and superimposed subcentimeter lucencies.  - CT chest : multilobar PNA? pt is afebrile  - pt was hypoxic, afebrile, no leucocytosis, today on RA  - Started Augmentin for aspiration PNA  - SLP, diet changed to pureed and thickened liquid.   Problem/Plan - 4:  ·  Problem: Chronic obstructive pulmonary disease (COPD).   ·  Plan: - C/w home inhalers  - On RA   - ABG noted, unclear if pt will tolerate BIPAP  - Pulm follow up   Problem/Plan - 5:  ·  Problem: HTN (hypertension).   ·  Plan:   - BP acceptable.   Problem/Plan - 6:  ·  Problem: Schizophrenia.   ·  Plan: - C/w home meds.   Problem/Plan - 7:  ·  Problem: HLD (hyperlipidemia).   ·  Plan: - C/w home atorvastatin.  ABG with primary met Alk and secondary to resp acidosis:  - K ok, unknown etiology of met alk, chronic, outpt renal follow up

## 2024-11-26 NOTE — DISCHARGE NOTE PROVIDER - NSDCMRMEDTOKEN_GEN_ALL_CORE_FT
amoxicillin-clavulanate 875 mg-125 mg oral tablet: 1 tab(s) orally 2 times a day  Aspirin Enteric Coated 81 mg oral delayed release tablet: 1 tab(s) orally once a day  atorvastatin 20 mg oral tablet: 1 tab(s) orally once a day  Breo Ellipta 100 mcg-25 mcg/inh inhalation powder: 1 puff(s) inhaled once a day  Cardizem  mg/24 hours oral capsule, extended release: 1 cap(s) orally once a day  CeleXA 20 mg oral tablet: 1 tab(s) orally once a day  clopidogrel 75 mg oral tablet: 1 tab(s) orally once a day  Colace 100 mg oral capsule: 1 cap(s) orally 2 times a day  Daily-Camila Men&#x27;s Formula: 400 microgram(s) orally once a day  Depakote  mg oral tablet, extended release: orally 2 times a day  Flomax 0.4 mg oral capsule: 1 cap(s) orally once a day (at bedtime)  lactulose 10 g/15 mL oral solution: orally once a day  Myrbetriq 25 mg oral tablet, extended release: 1 tab(s) orally once a day  OLANZapine 20 mg oral tablet: 0.5 tab(s) orally once a day  Ventolin HFA: 200  inhaled every 4 hours, As Needed

## 2024-11-26 NOTE — DISCHARGE NOTE PROVIDER - NSDCFUADDAPPT_GEN_ALL_CORE_FT
It is important to see your primary physician as well as the physicians noted below within the next week to perform a comprehensive medical review.  Call their offices for an appointment as soon as you leave the hospital.  You will also need to see them for renewal of your medications.  If you do not have a primary physician, contact the [facility] Physician Referral Service at [phone].  To obtain your results, you can access the [facility] Patient Portal at [email or web address].  Your medical issues appear to be stable at this time, but if your symptoms recur or worsen, contact your physicians and/or return to the hospital if necessary.  If you encounter any issues or questions with your medication, call your physicians before stopping the medication.

## 2024-11-26 NOTE — PROGRESS NOTE ADULT - ASSESSMENT
79 year old F with PMHx schizophrenia (lives in group home), COPD, intellectual disability and HTN presented to hospital from group home with RT facial droop & slurred speech. Symptoms resolved spontaneously and stroke work up has been negative thus far. Pulmonary consulted to evaluate for possible aspiration PNA.       Dx: acute hypoxic respiratory failure, aspiration PNA?       Recommendations:   - CT Chest: Scattered bronchial secretions.  Emphysema. Patchy bilateral lung opacities with a lower lobe predominance.  - TTE: relatively normal study; Normal EF.   -  stroke work up negative thus far   - WBC elevated on labs from 3 days ago. afebrile.   - RVP negative  -  on ROOM AIR with Spo2 94%. Please titrate as tolerated for goal Spo2 >92%   - can continue on augmentin for possible aspiration PNA   - if there is concern for possible aspiration - patient should be made NPO and undergo official speech and swallow evaluation.   - please send legionella, mycoplasma, strep PNA, MRSA PCR.   - would further clarify goals of care including code/intubation status and if needed who surrogate decision maker is for this patient as they are with intellectual disability. Should hospital course be prolonged or condition worsen it will become important to determine if patient falls under OPWDD or a surrogate decision maker will need to be appointed.   - rest of care per primary team     Discussed with Dr. Banda.        79 year old F with PMHx schizophrenia (lives in group home), COPD, intellectual disability and HTN presented to hospital from group home with RT facial droop & slurred speech. Symptoms resolved spontaneously and stroke work up has been negative thus far. Pulmonary consulted to evaluate for possible aspiration PNA.       Dx: acute hypoxic respiratory failure, aspiration PNA?       Recommendations:   - CT Chest: Scattered bronchial secretions.  Emphysema. Patchy bilateral lung opacities with a lower lobe predominance.  - TTE: relatively normal study; Normal EF.   -  stroke work up negative thus far   - WBC elevated on labs from 3 days ago. afebrile.   - RVP negative  -  on ROOM AIR with Spo2 94%. Please titrate as tolerated for goal Spo2 >92%   - can continue on augmentin for possible aspiration PNA   - passed official speech & swallow yesterday .   - please send legionella, mycoplasma, strep PNA, MRSA PCR.   - would further clarify goals of care including code/intubation status and if needed who surrogate decision maker is for this patient as they are with intellectual disability. Should hospital course be prolonged or condition worsen it will become important to determine if patient falls under OPWDD or a surrogate decision maker will need to be appointed.   - rest of care per primary team   - pulmonary will sign off at this time. please reconsult with any questions     Discussed with Dr. Ward

## 2024-12-04 DIAGNOSIS — Z91.041 RADIOGRAPHIC DYE ALLERGY STATUS: ICD-10-CM

## 2024-12-04 DIAGNOSIS — R29.810 FACIAL WEAKNESS: ICD-10-CM

## 2024-12-04 DIAGNOSIS — Z79.82 LONG TERM (CURRENT) USE OF ASPIRIN: ICD-10-CM

## 2024-12-04 DIAGNOSIS — J69.0 PNEUMONITIS DUE TO INHALATION OF FOOD AND VOMIT: ICD-10-CM

## 2024-12-04 DIAGNOSIS — I10 ESSENTIAL (PRIMARY) HYPERTENSION: ICD-10-CM

## 2024-12-04 DIAGNOSIS — J44.9 CHRONIC OBSTRUCTIVE PULMONARY DISEASE, UNSPECIFIED: ICD-10-CM

## 2024-12-04 DIAGNOSIS — F70 MILD INTELLECTUAL DISABILITIES: ICD-10-CM

## 2024-12-04 DIAGNOSIS — E44.0 MODERATE PROTEIN-CALORIE MALNUTRITION: ICD-10-CM

## 2024-12-04 DIAGNOSIS — F20.9 SCHIZOPHRENIA, UNSPECIFIED: ICD-10-CM

## 2024-12-04 DIAGNOSIS — Z87.891 PERSONAL HISTORY OF NICOTINE DEPENDENCE: ICD-10-CM

## 2024-12-04 DIAGNOSIS — R47.81 SLURRED SPEECH: ICD-10-CM

## 2024-12-04 DIAGNOSIS — E87.4 MIXED DISORDER OF ACID-BASE BALANCE: ICD-10-CM

## 2024-12-04 DIAGNOSIS — Z11.52 ENCOUNTER FOR SCREENING FOR COVID-19: ICD-10-CM

## 2024-12-04 DIAGNOSIS — E78.5 HYPERLIPIDEMIA, UNSPECIFIED: ICD-10-CM

## 2024-12-04 DIAGNOSIS — E87.1 HYPO-OSMOLALITY AND HYPONATREMIA: ICD-10-CM

## 2025-01-22 NOTE — ED ADULT NURSE NOTE - NSFALLRSKINDICATORS_ED_ALL_ED
Patient reports good fm, no n/v, headache,  bleeding, loss of fluid, dom violence, or smoking.  tony pnv some cramping and edema urine negative/negative 1-/-1 soft post  Assessment & Plan  AMA (advanced maternal age) multigravida 35+, third trimester         Diet controlled gestational diabetes mellitus (GDM) in third trimester    Lab Results   Component Value Date    HGBA1C 4.7 2023            Prenatal care in third trimester  - Continue PNV  - Labor precautions reviewed  - Fetal kick counts reviewed  - Labs: UTD; Failed 3 hr- GDM  - Ultrasounds:  US at 35w2d: EFW Hadlock 4 2610 grams - 5 lbs 12 oz (44%)   - Tdap: Done 24  - Flu Shot:   - RSV:   - COVID: Unvaccinated  - Rhogam: N/A  - Delivery:  without epidural  - Contraception: Tubal if C/S, otherwise, vasectomy  - Breastfeeding: Formula  - Pediatrician: Established   -Delivery consent signed.  -GBS: neg  -A1 GDM concern for LGA baby has this feels to her as biggest child and is male reviewed IOL at 39 weeks 1 day pit AROM message sent to staff to schedule, may want membrane sweep next visit discussed scheduling with staff experience with that  - RTO in 1 weeks              no

## 2025-02-15 NOTE — PHYSICAL THERAPY INITIAL EVALUATION ADULT - ASR WT BEARING STATUS EVAL
[facility] Emergency Department - Culture Follow-Up    Initial ED Visit: 2/13/2025  Initial ED Diagnosis: Fall, hip contusion    Date of Follow-Up: 2/15/2025  Reason for Follow-Up: Confusion    Positive Culture Result: Urine - gram negative bacilli > 100,000 CFU    Plan: Advised taking patient to emergency department.    Patient's sister called in to let us know that patient is having increased confusion and difficulty word finding they are concerned she has a urinary tract infection.  The preliminary result is showing gram-negative bacilli greater than 100,000 CFU.  Reviewing the note from Dr. Rodriguez did not prescribe antibiotic as it was only nitrate positive no leuk esterase and nothing on microscopy he did obtain a urine culture he was concerned about antibiotics given patient's concomitant warfarin use and drug drug interaction.  Given that the patient now is more altered and there is concern for complicated urinary tract infection are nurses team as well as myself advised the patient sister to bring her to an emergency department.  I called to follow-up and the patient sister was driving her to [facility] in [place].    I called and spoke with one of the nurses in the [facility] emergency department and gave them the preliminary culture result          Justin Cooper MD  2:56 PM     no weight-bearing restrictions

## 2025-06-23 NOTE — PHYSICAL THERAPY INITIAL EVALUATION ADULT - PHYSICAL ASSIST/NONPHYSICAL ASSIST: SUPINE/SIT, REHAB EVAL
Saba Richardson is a 85 year old female here for  Chief Complaint   Patient presents with    Cancer     MGUS (monoclonal gammopathy of unknown significance)     Denies latex allergy or sensitivity.    Medication verified and med list updated.  PCP and Pharmacy verified.    Social History     Tobacco Use   Smoking Status Former   Smokeless Tobacco Never   Tobacco Comments    smoked when young     Advance Directives Filed: Yes    ECOG:   ECOG [06/23/25 1409]   ECOG Performance Status 0       Vitals:    Visit Vitals  /64 (BP Location: RUE - Right upper extremity, Patient Position: Sitting)   Pulse 74   Temp 97.6 °F (36.4 °C) (Oral)   Resp 16   Wt 71.5 kg (157 lb 9.6 oz)   LMP  (LMP Unknown)   SpO2 96%   BMI 28.14 kg/m²       These vital signs are:  Within defined parameters (Per Reference \"Defined Limits Hospital Outpatient Department (HOD)\")    Height: No.  Ht Readings from Last 1 Encounters:   06/18/25 5' 2.75\" (1.594 m)     Weight:Yes, shoes on.  Wt Readings from Last 3 Encounters:   06/23/25 71.5 kg (157 lb 9.6 oz)   06/18/25 71.3 kg (157 lb 3.2 oz)   12/12/24 73.5 kg (162 lb)       BMI: Body mass index is 28.14 kg/m².    PSYCHIATRIC: Patient denies insomnia, depression, anxiety    This patient reported abnormal symptoms that needed immediate verbal communication: No    supervision

## 2025-08-12 ENCOUNTER — INPATIENT (INPATIENT)
Facility: HOSPITAL | Age: 80
LOS: 2 days | Discharge: HOME | End: 2025-08-15
Attending: INTERNAL MEDICINE | Admitting: INTERNAL MEDICINE
Payer: MEDICARE

## 2025-08-12 VITALS
TEMPERATURE: 98 F | DIASTOLIC BLOOD PRESSURE: 79 MMHG | WEIGHT: 160.06 LBS | HEART RATE: 69 BPM | SYSTOLIC BLOOD PRESSURE: 147 MMHG | RESPIRATION RATE: 20 BRPM | HEIGHT: 69 IN | OXYGEN SATURATION: 98 %

## 2025-08-12 LAB
ALBUMIN SERPL ELPH-MCNC: 2.7 G/DL — LOW (ref 3.3–5)
ALP SERPL-CCNC: 127 U/L — HIGH (ref 40–120)
ALT FLD-CCNC: 24 U/L — SIGNIFICANT CHANGE UP (ref 12–78)
ANION GAP SERPL CALC-SCNC: 5 MMOL/L — SIGNIFICANT CHANGE UP (ref 5–17)
APTT BLD: 42.9 SEC — HIGH (ref 26.1–36.8)
AST SERPL-CCNC: 16 U/L — SIGNIFICANT CHANGE UP (ref 15–37)
BASE EXCESS BLDV CALC-SCNC: 5.3 MMOL/L — HIGH (ref -2–3)
BASOPHILS # BLD AUTO: 0.01 K/UL — SIGNIFICANT CHANGE UP (ref 0–0.2)
BASOPHILS NFR BLD AUTO: 0.2 % — SIGNIFICANT CHANGE UP (ref 0–2)
BILIRUB SERPL-MCNC: 0.7 MG/DL — SIGNIFICANT CHANGE UP (ref 0.2–1.2)
BLOOD GAS COMMENTS, VENOUS: SIGNIFICANT CHANGE UP
BUN SERPL-MCNC: 9 MG/DL — SIGNIFICANT CHANGE UP (ref 7–23)
CALCIUM SERPL-MCNC: 8.5 MG/DL — SIGNIFICANT CHANGE UP (ref 8.5–10.1)
CHLORIDE SERPL-SCNC: 92 MMOL/L — LOW (ref 96–108)
CO2 BLDV-SCNC: 32 MMOL/L — HIGH (ref 22–26)
CO2 SERPL-SCNC: 30 MMOL/L — SIGNIFICANT CHANGE UP (ref 22–31)
CREAT SERPL-MCNC: 0.41 MG/DL — LOW (ref 0.5–1.3)
EGFR: 111 ML/MIN/1.73M2 — SIGNIFICANT CHANGE UP
EGFR: 111 ML/MIN/1.73M2 — SIGNIFICANT CHANGE UP
EOSINOPHIL # BLD AUTO: 0.01 K/UL — SIGNIFICANT CHANGE UP (ref 0–0.5)
EOSINOPHIL NFR BLD AUTO: 0.2 % — SIGNIFICANT CHANGE UP (ref 0–6)
FLUAV AG NPH QL: SIGNIFICANT CHANGE UP
FLUBV AG NPH QL: SIGNIFICANT CHANGE UP
GAS PNL BLDV: SIGNIFICANT CHANGE UP
GLUCOSE SERPL-MCNC: 114 MG/DL — HIGH (ref 70–99)
HCO3 BLDV-SCNC: 30 MMOL/L — HIGH (ref 22–28)
HCT VFR BLD CALC: 31.6 % — LOW (ref 39–50)
HGB BLD-MCNC: 11.5 G/DL — LOW (ref 13–17)
HOROWITZ INDEX BLDV+IHG-RTO: SIGNIFICANT CHANGE UP
IMM GRANULOCYTES NFR BLD AUTO: 0.8 % — SIGNIFICANT CHANGE UP (ref 0–0.9)
INR BLD: 1.34 RATIO — HIGH (ref 0.85–1.16)
LACTATE SERPL-SCNC: 0.7 MMOL/L — SIGNIFICANT CHANGE UP (ref 0.7–2)
LYMPHOCYTES # BLD AUTO: 0.87 K/UL — LOW (ref 1–3.3)
LYMPHOCYTES # BLD AUTO: 13.1 % — SIGNIFICANT CHANGE UP (ref 13–44)
MCHC RBC-ENTMCNC: 32.5 PG — SIGNIFICANT CHANGE UP (ref 27–34)
MCHC RBC-ENTMCNC: 36.4 G/DL — HIGH (ref 32–36)
MCV RBC AUTO: 89.3 FL — SIGNIFICANT CHANGE UP (ref 80–100)
MONOCYTES # BLD AUTO: 1.04 K/UL — HIGH (ref 0–0.9)
MONOCYTES NFR BLD AUTO: 15.6 % — HIGH (ref 2–14)
NEUTROPHILS # BLD AUTO: 4.68 K/UL — SIGNIFICANT CHANGE UP (ref 1.8–7.4)
NEUTROPHILS NFR BLD AUTO: 70.1 % — SIGNIFICANT CHANGE UP (ref 43–77)
NRBC BLD AUTO-RTO: 0 /100 WBCS — SIGNIFICANT CHANGE UP (ref 0–0)
NT-PROBNP SERPL-SCNC: 177 PG/ML — SIGNIFICANT CHANGE UP (ref 0–450)
PCO2 BLDV: 46 MMHG — SIGNIFICANT CHANGE UP (ref 42–55)
PH BLDV: 7.43 — SIGNIFICANT CHANGE UP (ref 7.32–7.43)
PLATELET # BLD AUTO: 209 K/UL — SIGNIFICANT CHANGE UP (ref 150–400)
PO2 BLDV: 85 MMHG — HIGH (ref 25–45)
POTASSIUM SERPL-MCNC: 4.1 MMOL/L — SIGNIFICANT CHANGE UP (ref 3.5–5.3)
POTASSIUM SERPL-SCNC: 4.1 MMOL/L — SIGNIFICANT CHANGE UP (ref 3.5–5.3)
PROT SERPL-MCNC: 6.7 GM/DL — SIGNIFICANT CHANGE UP (ref 6–8.3)
PROTHROM AB SERPL-ACNC: 15.1 SEC — HIGH (ref 9.9–13.4)
RBC # BLD: 3.54 M/UL — LOW (ref 4.2–5.8)
RBC # FLD: 13.1 % — SIGNIFICANT CHANGE UP (ref 10.3–14.5)
RSV RNA NPH QL NAA+NON-PROBE: SIGNIFICANT CHANGE UP
SAO2 % BLDV: 98.5 % — HIGH (ref 94–98)
SARS-COV-2 RNA SPEC QL NAA+PROBE: SIGNIFICANT CHANGE UP
SODIUM SERPL-SCNC: 127 MMOL/L — LOW (ref 135–145)
SOURCE RESPIRATORY: SIGNIFICANT CHANGE UP
TROPONIN I, HIGH SENSITIVITY RESULT: 6.4 NG/L — SIGNIFICANT CHANGE UP
VALPROATE SERPL-MCNC: 88 UG/ML — SIGNIFICANT CHANGE UP (ref 50–100)
WBC # BLD: 6.66 K/UL — SIGNIFICANT CHANGE UP (ref 3.8–10.5)
WBC # FLD AUTO: 6.66 K/UL — SIGNIFICANT CHANGE UP (ref 3.8–10.5)

## 2025-08-12 PROCEDURE — 71046 X-RAY EXAM CHEST 2 VIEWS: CPT | Mod: 26

## 2025-08-12 PROCEDURE — 99285 EMERGENCY DEPT VISIT HI MDM: CPT

## 2025-08-12 PROCEDURE — 71275 CT ANGIOGRAPHY CHEST: CPT | Mod: 26

## 2025-08-12 PROCEDURE — 93010 ELECTROCARDIOGRAM REPORT: CPT

## 2025-08-13 DIAGNOSIS — Z29.9 ENCOUNTER FOR PROPHYLACTIC MEASURES, UNSPECIFIED: ICD-10-CM

## 2025-08-13 DIAGNOSIS — Z87.438 PERSONAL HISTORY OF OTHER DISEASES OF MALE GENITAL ORGANS: ICD-10-CM

## 2025-08-13 DIAGNOSIS — N32.81 OVERACTIVE BLADDER: ICD-10-CM

## 2025-08-13 DIAGNOSIS — J44.9 CHRONIC OBSTRUCTIVE PULMONARY DISEASE, UNSPECIFIED: ICD-10-CM

## 2025-08-13 DIAGNOSIS — E87.1 HYPO-OSMOLALITY AND HYPONATREMIA: ICD-10-CM

## 2025-08-13 DIAGNOSIS — K59.00 CONSTIPATION, UNSPECIFIED: ICD-10-CM

## 2025-08-13 DIAGNOSIS — E78.5 HYPERLIPIDEMIA, UNSPECIFIED: ICD-10-CM

## 2025-08-13 DIAGNOSIS — Z86.59 PERSONAL HISTORY OF OTHER MENTAL AND BEHAVIORAL DISORDERS: ICD-10-CM

## 2025-08-13 DIAGNOSIS — J96.01 ACUTE RESPIRATORY FAILURE WITH HYPOXIA: ICD-10-CM

## 2025-08-13 LAB
ALBUMIN SERPL ELPH-MCNC: 2.6 G/DL — LOW (ref 3.3–5)
ALP SERPL-CCNC: 131 U/L — HIGH (ref 40–120)
ALT FLD-CCNC: 22 U/L — SIGNIFICANT CHANGE UP (ref 12–78)
ANION GAP SERPL CALC-SCNC: 5 MMOL/L — SIGNIFICANT CHANGE UP (ref 5–17)
APPEARANCE UR: CLEAR — SIGNIFICANT CHANGE UP
AST SERPL-CCNC: 17 U/L — SIGNIFICANT CHANGE UP (ref 15–37)
BASOPHILS # BLD AUTO: 0.02 K/UL — SIGNIFICANT CHANGE UP (ref 0–0.2)
BASOPHILS NFR BLD AUTO: 0.3 % — SIGNIFICANT CHANGE UP (ref 0–2)
BILIRUB SERPL-MCNC: 0.5 MG/DL — SIGNIFICANT CHANGE UP (ref 0.2–1.2)
BILIRUB UR-MCNC: NEGATIVE — SIGNIFICANT CHANGE UP
BUN SERPL-MCNC: 8 MG/DL — SIGNIFICANT CHANGE UP (ref 7–23)
CALCIUM SERPL-MCNC: 8.7 MG/DL — SIGNIFICANT CHANGE UP (ref 8.5–10.1)
CHLORIDE SERPL-SCNC: 91 MMOL/L — LOW (ref 96–108)
CO2 SERPL-SCNC: 32 MMOL/L — HIGH (ref 22–31)
COLOR SPEC: YELLOW — SIGNIFICANT CHANGE UP
CREAT SERPL-MCNC: 0.52 MG/DL — SIGNIFICANT CHANGE UP (ref 0.5–1.3)
DIFF PNL FLD: NEGATIVE — SIGNIFICANT CHANGE UP
EGFR: 103 ML/MIN/1.73M2 — SIGNIFICANT CHANGE UP
EGFR: 103 ML/MIN/1.73M2 — SIGNIFICANT CHANGE UP
EOSINOPHIL # BLD AUTO: 0.05 K/UL — SIGNIFICANT CHANGE UP (ref 0–0.5)
EOSINOPHIL NFR BLD AUTO: 0.8 % — SIGNIFICANT CHANGE UP (ref 0–6)
GLUCOSE SERPL-MCNC: 141 MG/DL — HIGH (ref 70–99)
GLUCOSE UR QL: NEGATIVE MG/DL — SIGNIFICANT CHANGE UP
HCT VFR BLD CALC: 32.2 % — LOW (ref 39–50)
HGB BLD-MCNC: 11.5 G/DL — LOW (ref 13–17)
IMM GRANULOCYTES NFR BLD AUTO: 0.8 % — SIGNIFICANT CHANGE UP (ref 0–0.9)
KETONES UR QL: ABNORMAL MG/DL
LEUKOCYTE ESTERASE UR-ACNC: NEGATIVE — SIGNIFICANT CHANGE UP
LYMPHOCYTES # BLD AUTO: 1.2 K/UL — SIGNIFICANT CHANGE UP (ref 1–3.3)
LYMPHOCYTES # BLD AUTO: 19.3 % — SIGNIFICANT CHANGE UP (ref 13–44)
MAGNESIUM SERPL-MCNC: 1.7 MG/DL — SIGNIFICANT CHANGE UP (ref 1.6–2.6)
MCHC RBC-ENTMCNC: 32.3 PG — SIGNIFICANT CHANGE UP (ref 27–34)
MCHC RBC-ENTMCNC: 35.7 G/DL — SIGNIFICANT CHANGE UP (ref 32–36)
MCV RBC AUTO: 90.4 FL — SIGNIFICANT CHANGE UP (ref 80–100)
MONOCYTES # BLD AUTO: 1.02 K/UL — HIGH (ref 0–0.9)
MONOCYTES NFR BLD AUTO: 16.4 % — HIGH (ref 2–14)
NEUTROPHILS # BLD AUTO: 3.87 K/UL — SIGNIFICANT CHANGE UP (ref 1.8–7.4)
NEUTROPHILS NFR BLD AUTO: 62.4 % — SIGNIFICANT CHANGE UP (ref 43–77)
NITRITE UR-MCNC: NEGATIVE — SIGNIFICANT CHANGE UP
NRBC BLD AUTO-RTO: 0 /100 WBCS — SIGNIFICANT CHANGE UP (ref 0–0)
PH UR: 7.5 — SIGNIFICANT CHANGE UP (ref 5–8)
PLATELET # BLD AUTO: 205 K/UL — SIGNIFICANT CHANGE UP (ref 150–400)
POTASSIUM SERPL-MCNC: 3.5 MMOL/L — SIGNIFICANT CHANGE UP (ref 3.5–5.3)
POTASSIUM SERPL-SCNC: 3.5 MMOL/L — SIGNIFICANT CHANGE UP (ref 3.5–5.3)
POTASSIUM UR-SCNC: 39 MMOL/L — SIGNIFICANT CHANGE UP
PROCALCITONIN SERPL-MCNC: 0.05 NG/ML — SIGNIFICANT CHANGE UP (ref 0.02–0.1)
PROT SERPL-MCNC: 6.4 GM/DL — SIGNIFICANT CHANGE UP (ref 6–8.3)
PROT UR-MCNC: NEGATIVE MG/DL — SIGNIFICANT CHANGE UP
RBC # BLD: 3.56 M/UL — LOW (ref 4.2–5.8)
RBC # FLD: 13.1 % — SIGNIFICANT CHANGE UP (ref 10.3–14.5)
SODIUM SERPL-SCNC: 128 MMOL/L — LOW (ref 135–145)
SODIUM UR-SCNC: 58 MMOL/L — SIGNIFICANT CHANGE UP
SP GR SPEC: >1.03 — HIGH (ref 1–1.03)
UROBILINOGEN FLD QL: 1 MG/DL — SIGNIFICANT CHANGE UP (ref 0.2–1)
UUN UR-MCNC: 358 MG/DL — SIGNIFICANT CHANGE UP
WBC # BLD: 6.21 K/UL — SIGNIFICANT CHANGE UP (ref 3.8–10.5)
WBC # FLD AUTO: 6.21 K/UL — SIGNIFICANT CHANGE UP (ref 3.8–10.5)

## 2025-08-13 PROCEDURE — 99223 1ST HOSP IP/OBS HIGH 75: CPT

## 2025-08-13 PROCEDURE — 99222 1ST HOSP IP/OBS MODERATE 55: CPT

## 2025-08-13 RX ORDER — CLOPIDOGREL BISULFATE 75 MG/1
75 TABLET, FILM COATED ORAL DAILY
Refills: 0 | Status: DISCONTINUED | OUTPATIENT
Start: 2025-08-13 | End: 2025-08-15

## 2025-08-13 RX ORDER — B1/B2/B3/B5/B6/B12/VIT C/FOLIC 500-0.5 MG
1 TABLET ORAL DAILY
Refills: 0 | Status: DISCONTINUED | OUTPATIENT
Start: 2025-08-13 | End: 2025-08-15

## 2025-08-13 RX ORDER — ACETAMINOPHEN 500 MG/5ML
650 LIQUID (ML) ORAL EVERY 6 HOURS
Refills: 0 | Status: DISCONTINUED | OUTPATIENT
Start: 2025-08-13 | End: 2025-08-15

## 2025-08-13 RX ORDER — ATORVASTATIN CALCIUM 80 MG/1
20 TABLET, FILM COATED ORAL AT BEDTIME
Refills: 0 | Status: DISCONTINUED | OUTPATIENT
Start: 2025-08-13 | End: 2025-08-15

## 2025-08-13 RX ORDER — CEFTRIAXONE 500 MG/1
1000 INJECTION, POWDER, FOR SOLUTION INTRAMUSCULAR; INTRAVENOUS ONCE
Refills: 0 | Status: DISCONTINUED | OUTPATIENT
Start: 2025-08-13 | End: 2025-08-13

## 2025-08-13 RX ORDER — ENOXAPARIN SODIUM 100 MG/ML
40 INJECTION SUBCUTANEOUS EVERY 24 HOURS
Refills: 0 | Status: DISCONTINUED | OUTPATIENT
Start: 2025-08-13 | End: 2025-08-15

## 2025-08-13 RX ORDER — LACTULOSE 10 G/15ML
10 SOLUTION ORAL DAILY
Refills: 0 | Status: DISCONTINUED | OUTPATIENT
Start: 2025-08-13 | End: 2025-08-15

## 2025-08-13 RX ORDER — CEFTRIAXONE 500 MG/1
1000 INJECTION, POWDER, FOR SOLUTION INTRAMUSCULAR; INTRAVENOUS ONCE
Refills: 0 | Status: COMPLETED | OUTPATIENT
Start: 2025-08-13 | End: 2025-08-13

## 2025-08-13 RX ORDER — IPRATROPIUM BROMIDE AND ALBUTEROL SULFATE .5; 2.5 MG/3ML; MG/3ML
3 SOLUTION RESPIRATORY (INHALATION) EVERY 6 HOURS
Refills: 0 | Status: DISCONTINUED | OUTPATIENT
Start: 2025-08-13 | End: 2025-08-15

## 2025-08-13 RX ORDER — CITALOPRAM 20 MG/1
20 TABLET ORAL DAILY
Refills: 0 | Status: DISCONTINUED | OUTPATIENT
Start: 2025-08-13 | End: 2025-08-15

## 2025-08-13 RX ORDER — OXYBUTYNIN CHLORIDE 5 MG/1
5 TABLET, FILM COATED, EXTENDED RELEASE ORAL
Refills: 0 | Status: DISCONTINUED | OUTPATIENT
Start: 2025-08-13 | End: 2025-08-15

## 2025-08-13 RX ORDER — TAMSULOSIN HYDROCHLORIDE 0.4 MG/1
0.4 CAPSULE ORAL AT BEDTIME
Refills: 0 | Status: DISCONTINUED | OUTPATIENT
Start: 2025-08-13 | End: 2025-08-15

## 2025-08-13 RX ORDER — ASPIRIN 325 MG
81 TABLET ORAL DAILY
Refills: 0 | Status: DISCONTINUED | OUTPATIENT
Start: 2025-08-13 | End: 2025-08-15

## 2025-08-13 RX ORDER — OLANZAPINE 10 MG/1
10 TABLET ORAL DAILY
Refills: 0 | Status: DISCONTINUED | OUTPATIENT
Start: 2025-08-13 | End: 2025-08-15

## 2025-08-13 RX ORDER — AZITHROMYCIN 250 MG
500 CAPSULE ORAL ONCE
Refills: 0 | Status: COMPLETED | OUTPATIENT
Start: 2025-08-13 | End: 2025-08-13

## 2025-08-13 RX ADMIN — Medication 1 APPLICATION(S): at 13:10

## 2025-08-13 RX ADMIN — TAMSULOSIN HYDROCHLORIDE 0.4 MILLIGRAM(S): 0.4 CAPSULE ORAL at 22:00

## 2025-08-13 RX ADMIN — ENOXAPARIN SODIUM 40 MILLIGRAM(S): 100 INJECTION SUBCUTANEOUS at 06:05

## 2025-08-13 RX ADMIN — OXYBUTYNIN CHLORIDE 5 MILLIGRAM(S): 5 TABLET, FILM COATED, EXTENDED RELEASE ORAL at 06:05

## 2025-08-13 RX ADMIN — ATORVASTATIN CALCIUM 20 MILLIGRAM(S): 80 TABLET, FILM COATED ORAL at 22:00

## 2025-08-13 RX ADMIN — LACTULOSE 10 GRAM(S): 10 SOLUTION ORAL at 13:09

## 2025-08-13 RX ADMIN — OLANZAPINE 10 MILLIGRAM(S): 10 TABLET ORAL at 13:08

## 2025-08-13 RX ADMIN — Medication 500 MILLIGRAM(S): at 06:05

## 2025-08-13 RX ADMIN — Medication 255 MILLIGRAM(S): at 01:44

## 2025-08-13 RX ADMIN — IPRATROPIUM BROMIDE AND ALBUTEROL SULFATE 3 MILLILITER(S): .5; 2.5 SOLUTION RESPIRATORY (INHALATION) at 17:18

## 2025-08-13 RX ADMIN — OXYBUTYNIN CHLORIDE 5 MILLIGRAM(S): 5 TABLET, FILM COATED, EXTENDED RELEASE ORAL at 17:03

## 2025-08-13 RX ADMIN — Medication 500 MILLIGRAM(S): at 22:00

## 2025-08-13 RX ADMIN — Medication 1 DOSE(S): at 17:04

## 2025-08-13 RX ADMIN — CEFTRIAXONE 1000 MILLIGRAM(S): 500 INJECTION, POWDER, FOR SOLUTION INTRAMUSCULAR; INTRAVENOUS at 01:44

## 2025-08-13 RX ADMIN — CLOPIDOGREL BISULFATE 75 MILLIGRAM(S): 75 TABLET, FILM COATED ORAL at 13:09

## 2025-08-13 RX ADMIN — Medication 81 MILLIGRAM(S): at 13:09

## 2025-08-13 RX ADMIN — Medication 1 DOSE(S): at 06:05

## 2025-08-13 RX ADMIN — IPRATROPIUM BROMIDE AND ALBUTEROL SULFATE 3 MILLILITER(S): .5; 2.5 SOLUTION RESPIRATORY (INHALATION) at 12:14

## 2025-08-13 RX ADMIN — Medication 1 TABLET(S): at 13:09

## 2025-08-13 RX ADMIN — IPRATROPIUM BROMIDE AND ALBUTEROL SULFATE 3 MILLILITER(S): .5; 2.5 SOLUTION RESPIRATORY (INHALATION) at 23:35

## 2025-08-13 RX ADMIN — CITALOPRAM 20 MILLIGRAM(S): 20 TABLET ORAL at 13:09

## 2025-08-14 LAB
ANION GAP SERPL CALC-SCNC: 7 MMOL/L — SIGNIFICANT CHANGE UP (ref 5–17)
BUN SERPL-MCNC: 8 MG/DL — SIGNIFICANT CHANGE UP (ref 7–23)
CALCIUM SERPL-MCNC: 9 MG/DL — SIGNIFICANT CHANGE UP (ref 8.5–10.1)
CHLORIDE SERPL-SCNC: 90 MMOL/L — LOW (ref 96–108)
CO2 SERPL-SCNC: 28 MMOL/L — SIGNIFICANT CHANGE UP (ref 22–31)
CREAT SERPL-MCNC: 0.51 MG/DL — SIGNIFICANT CHANGE UP (ref 0.5–1.3)
EGFR: 104 ML/MIN/1.73M2 — SIGNIFICANT CHANGE UP
EGFR: 104 ML/MIN/1.73M2 — SIGNIFICANT CHANGE UP
GLUCOSE SERPL-MCNC: 139 MG/DL — HIGH (ref 70–99)
HCT VFR BLD CALC: 32.2 % — LOW (ref 39–50)
HGB BLD-MCNC: 11.9 G/DL — LOW (ref 13–17)
MCHC RBC-ENTMCNC: 32.6 PG — SIGNIFICANT CHANGE UP (ref 27–34)
MCHC RBC-ENTMCNC: 37 G/DL — HIGH (ref 32–36)
MCV RBC AUTO: 88.2 FL — SIGNIFICANT CHANGE UP (ref 80–100)
NRBC BLD AUTO-RTO: 0 /100 WBCS — SIGNIFICANT CHANGE UP (ref 0–0)
PLATELET # BLD AUTO: 236 K/UL — SIGNIFICANT CHANGE UP (ref 150–400)
POTASSIUM SERPL-MCNC: 3.5 MMOL/L — SIGNIFICANT CHANGE UP (ref 3.5–5.3)
POTASSIUM SERPL-SCNC: 3.5 MMOL/L — SIGNIFICANT CHANGE UP (ref 3.5–5.3)
RBC # BLD: 3.65 M/UL — LOW (ref 4.2–5.8)
RBC # FLD: 12.9 % — SIGNIFICANT CHANGE UP (ref 10.3–14.5)
SODIUM SERPL-SCNC: 125 MMOL/L — LOW (ref 135–145)
WBC # BLD: 7.51 K/UL — SIGNIFICANT CHANGE UP (ref 3.8–10.5)
WBC # FLD AUTO: 7.51 K/UL — SIGNIFICANT CHANGE UP (ref 3.8–10.5)

## 2025-08-14 PROCEDURE — 99232 SBSQ HOSP IP/OBS MODERATE 35: CPT

## 2025-08-14 RX ORDER — SOLIFENACIN SUCCIATE 5 MG/1
1 TABLET, FILM COATED ORAL
Refills: 0 | DISCHARGE

## 2025-08-14 RX ORDER — MIRABEGRON 50 MG/1
1 TABLET, FILM COATED, EXTENDED RELEASE ORAL
Refills: 0 | DISCHARGE

## 2025-08-14 RX ADMIN — OXYBUTYNIN CHLORIDE 5 MILLIGRAM(S): 5 TABLET, FILM COATED, EXTENDED RELEASE ORAL at 18:21

## 2025-08-14 RX ADMIN — TAMSULOSIN HYDROCHLORIDE 0.4 MILLIGRAM(S): 0.4 CAPSULE ORAL at 22:59

## 2025-08-14 RX ADMIN — ENOXAPARIN SODIUM 40 MILLIGRAM(S): 100 INJECTION SUBCUTANEOUS at 06:18

## 2025-08-14 RX ADMIN — CITALOPRAM 20 MILLIGRAM(S): 20 TABLET ORAL at 12:56

## 2025-08-14 RX ADMIN — IPRATROPIUM BROMIDE AND ALBUTEROL SULFATE 3 MILLILITER(S): .5; 2.5 SOLUTION RESPIRATORY (INHALATION) at 05:10

## 2025-08-14 RX ADMIN — Medication 1 DOSE(S): at 18:22

## 2025-08-14 RX ADMIN — Medication 500 MILLIGRAM(S): at 22:59

## 2025-08-14 RX ADMIN — OXYBUTYNIN CHLORIDE 5 MILLIGRAM(S): 5 TABLET, FILM COATED, EXTENDED RELEASE ORAL at 06:18

## 2025-08-14 RX ADMIN — IPRATROPIUM BROMIDE AND ALBUTEROL SULFATE 3 MILLILITER(S): .5; 2.5 SOLUTION RESPIRATORY (INHALATION) at 17:17

## 2025-08-14 RX ADMIN — IPRATROPIUM BROMIDE AND ALBUTEROL SULFATE 3 MILLILITER(S): .5; 2.5 SOLUTION RESPIRATORY (INHALATION) at 11:13

## 2025-08-14 RX ADMIN — Medication 81 MILLIGRAM(S): at 12:55

## 2025-08-14 RX ADMIN — Medication 1 GRAM(S): at 18:21

## 2025-08-14 RX ADMIN — CLOPIDOGREL BISULFATE 75 MILLIGRAM(S): 75 TABLET, FILM COATED ORAL at 12:56

## 2025-08-14 RX ADMIN — Medication 500 MILLIGRAM(S): at 06:18

## 2025-08-14 RX ADMIN — Medication 1 DOSE(S): at 06:10

## 2025-08-14 RX ADMIN — Medication 1 TABLET(S): at 12:56

## 2025-08-14 RX ADMIN — Medication 1 APPLICATION(S): at 12:55

## 2025-08-14 RX ADMIN — ATORVASTATIN CALCIUM 20 MILLIGRAM(S): 80 TABLET, FILM COATED ORAL at 22:59

## 2025-08-14 RX ADMIN — LACTULOSE 10 GRAM(S): 10 SOLUTION ORAL at 12:56

## 2025-08-14 RX ADMIN — OLANZAPINE 10 MILLIGRAM(S): 10 TABLET ORAL at 12:56

## 2025-08-14 RX ADMIN — IPRATROPIUM BROMIDE AND ALBUTEROL SULFATE 3 MILLILITER(S): .5; 2.5 SOLUTION RESPIRATORY (INHALATION) at 23:19

## 2025-08-15 VITALS — OXYGEN SATURATION: 94 %

## 2025-08-15 LAB
ANION GAP SERPL CALC-SCNC: 5 MMOL/L — SIGNIFICANT CHANGE UP (ref 5–17)
BUN SERPL-MCNC: 9 MG/DL — SIGNIFICANT CHANGE UP (ref 7–23)
CALCIUM SERPL-MCNC: 8.4 MG/DL — LOW (ref 8.5–10.1)
CHLORIDE SERPL-SCNC: 96 MMOL/L — SIGNIFICANT CHANGE UP (ref 96–108)
CO2 SERPL-SCNC: 29 MMOL/L — SIGNIFICANT CHANGE UP (ref 22–31)
CREAT SERPL-MCNC: 0.48 MG/DL — LOW (ref 0.5–1.3)
EGFR: 106 ML/MIN/1.73M2 — SIGNIFICANT CHANGE UP
EGFR: 106 ML/MIN/1.73M2 — SIGNIFICANT CHANGE UP
GLUCOSE SERPL-MCNC: 108 MG/DL — HIGH (ref 70–99)
MAGNESIUM SERPL-MCNC: 1.8 MG/DL — SIGNIFICANT CHANGE UP (ref 1.6–2.6)
POTASSIUM SERPL-MCNC: 3.3 MMOL/L — LOW (ref 3.5–5.3)
POTASSIUM SERPL-SCNC: 3.3 MMOL/L — LOW (ref 3.5–5.3)
SODIUM SERPL-SCNC: 130 MMOL/L — LOW (ref 135–145)

## 2025-08-15 PROCEDURE — 99239 HOSP IP/OBS DSCHRG MGMT >30: CPT

## 2025-08-15 RX ADMIN — LACTULOSE 10 GRAM(S): 10 SOLUTION ORAL at 11:33

## 2025-08-15 RX ADMIN — CITALOPRAM 20 MILLIGRAM(S): 20 TABLET ORAL at 11:33

## 2025-08-15 RX ADMIN — Medication 1 APPLICATION(S): at 11:34

## 2025-08-15 RX ADMIN — OLANZAPINE 10 MILLIGRAM(S): 10 TABLET ORAL at 11:33

## 2025-08-15 RX ADMIN — IPRATROPIUM BROMIDE AND ALBUTEROL SULFATE 3 MILLILITER(S): .5; 2.5 SOLUTION RESPIRATORY (INHALATION) at 04:33

## 2025-08-15 RX ADMIN — Medication 1 DOSE(S): at 06:14

## 2025-08-15 RX ADMIN — OXYBUTYNIN CHLORIDE 5 MILLIGRAM(S): 5 TABLET, FILM COATED, EXTENDED RELEASE ORAL at 06:13

## 2025-08-15 RX ADMIN — CLOPIDOGREL BISULFATE 75 MILLIGRAM(S): 75 TABLET, FILM COATED ORAL at 11:33

## 2025-08-15 RX ADMIN — Medication 1 TABLET(S): at 11:33

## 2025-08-15 RX ADMIN — ENOXAPARIN SODIUM 40 MILLIGRAM(S): 100 INJECTION SUBCUTANEOUS at 06:13

## 2025-08-15 RX ADMIN — Medication 500 MILLIGRAM(S): at 06:13

## 2025-08-15 RX ADMIN — Medication 40 MILLIEQUIVALENT(S): at 09:28

## 2025-08-15 RX ADMIN — IPRATROPIUM BROMIDE AND ALBUTEROL SULFATE 3 MILLILITER(S): .5; 2.5 SOLUTION RESPIRATORY (INHALATION) at 11:16

## 2025-08-15 RX ADMIN — Medication 1 GRAM(S): at 06:13

## 2025-08-15 RX ADMIN — Medication 81 MILLIGRAM(S): at 11:33
